# Patient Record
Sex: MALE | Race: WHITE | NOT HISPANIC OR LATINO | Employment: OTHER | URBAN - METROPOLITAN AREA
[De-identification: names, ages, dates, MRNs, and addresses within clinical notes are randomized per-mention and may not be internally consistent; named-entity substitution may affect disease eponyms.]

---

## 2017-12-05 ENCOUNTER — APPOINTMENT (INPATIENT)
Dept: RADIOLOGY | Facility: HOSPITAL | Age: 67
DRG: 682 | End: 2017-12-05
Payer: MEDICARE

## 2017-12-05 ENCOUNTER — APPOINTMENT (EMERGENCY)
Dept: RADIOLOGY | Facility: HOSPITAL | Age: 67
DRG: 682 | End: 2017-12-05
Payer: MEDICARE

## 2017-12-05 ENCOUNTER — HOSPITAL ENCOUNTER (INPATIENT)
Facility: HOSPITAL | Age: 67
LOS: 7 days | Discharge: NURSING, REHAB OR LONG-TERM CARE | DRG: 682 | End: 2017-12-12
Attending: EMERGENCY MEDICINE | Admitting: INTERNAL MEDICINE
Payer: MEDICARE

## 2017-12-05 DIAGNOSIS — R29.6 RECURRENT FALLS: Primary | ICD-10-CM

## 2017-12-05 DIAGNOSIS — R27.0 ATAXIA: ICD-10-CM

## 2017-12-05 DIAGNOSIS — S83.8X9A MENISCAL INJURY: ICD-10-CM

## 2017-12-05 DIAGNOSIS — R77.8 ELEVATED TROPONIN: ICD-10-CM

## 2017-12-05 DIAGNOSIS — R74.8 ELEVATED CK-MB LEVEL: ICD-10-CM

## 2017-12-05 PROBLEM — E87.20 LACTIC ACID ACIDOSIS: Status: ACTIVE | Noted: 2017-12-05

## 2017-12-05 PROBLEM — G31.84 MILD COGNITIVE IMPAIRMENT: Chronic | Status: ACTIVE | Noted: 2017-12-05

## 2017-12-05 PROBLEM — R73.9 HYPERGLYCEMIA: Status: ACTIVE | Noted: 2017-12-05

## 2017-12-05 PROBLEM — R79.89 TROPONIN LEVEL ELEVATED: Status: ACTIVE | Noted: 2017-12-05

## 2017-12-05 PROBLEM — N17.9 AKI (ACUTE KIDNEY INJURY) (HCC): Status: ACTIVE | Noted: 2017-12-05

## 2017-12-05 PROBLEM — E87.2 LACTIC ACID ACIDOSIS: Status: ACTIVE | Noted: 2017-12-05

## 2017-12-05 PROBLEM — I10 HTN (HYPERTENSION): Status: ACTIVE | Noted: 2017-12-05

## 2017-12-05 PROBLEM — R65.10 SIRS (SYSTEMIC INFLAMMATORY RESPONSE SYNDROME) (HCC): Status: ACTIVE | Noted: 2017-12-05

## 2017-12-05 PROBLEM — T07.XXXA MULTIPLE INJURIES: Status: ACTIVE | Noted: 2017-12-05

## 2017-12-05 PROBLEM — M62.82 RHABDOMYOLYSIS: Status: ACTIVE | Noted: 2017-12-05

## 2017-12-05 LAB
ALBUMIN SERPL BCP-MCNC: 3.6 G/DL (ref 3.5–5)
ALP SERPL-CCNC: 98 U/L (ref 46–116)
ALT SERPL W P-5'-P-CCNC: 56 U/L (ref 12–78)
ANION GAP SERPL CALCULATED.3IONS-SCNC: 10 MMOL/L (ref 4–13)
AST SERPL W P-5'-P-CCNC: 129 U/L (ref 5–45)
BACTERIA UR QL AUTO: ABNORMAL /HPF
BASOPHILS # BLD AUTO: 0.02 THOUSANDS/ΜL (ref 0–0.1)
BASOPHILS NFR BLD AUTO: 0 % (ref 0–1)
BILIRUB SERPL-MCNC: 0.98 MG/DL (ref 0.2–1)
BILIRUB UR QL STRIP: ABNORMAL
BUN SERPL-MCNC: 25 MG/DL (ref 5–25)
CALCIUM SERPL-MCNC: 9.4 MG/DL (ref 8.3–10.1)
CHLORIDE SERPL-SCNC: 111 MMOL/L (ref 100–108)
CK MB SERPL-MCNC: 26.6 NG/ML (ref 0–5)
CK MB SERPL-MCNC: <1 % (ref 0–2.5)
CK SERPL-CCNC: 4079 U/L (ref 39–308)
CLARITY UR: ABNORMAL
CO2 SERPL-SCNC: 22 MMOL/L (ref 21–32)
COLOR UR: ABNORMAL
CREAT SERPL-MCNC: 1.52 MG/DL (ref 0.6–1.3)
EOSINOPHIL # BLD AUTO: 0 THOUSAND/ΜL (ref 0–0.61)
EOSINOPHIL NFR BLD AUTO: 0 % (ref 0–6)
ERYTHROCYTE [DISTWIDTH] IN BLOOD BY AUTOMATED COUNT: 14.3 % (ref 11.6–15.1)
GFR SERPL CREATININE-BSD FRML MDRD: 47 ML/MIN/1.73SQ M
GLUCOSE SERPL-MCNC: 164 MG/DL (ref 65–140)
GLUCOSE UR STRIP-MCNC: NEGATIVE MG/DL
HCT VFR BLD AUTO: 42.2 % (ref 36.5–49.3)
HGB BLD-MCNC: 14.1 G/DL (ref 12–17)
HGB UR QL STRIP.AUTO: ABNORMAL
HYALINE CASTS #/AREA URNS LPF: ABNORMAL /LPF
KETONES UR STRIP-MCNC: ABNORMAL MG/DL
LACTATE SERPL-SCNC: 2.5 MMOL/L (ref 0.5–2)
LACTATE SERPL-SCNC: 3.3 MMOL/L (ref 0.5–2)
LEUKOCYTE ESTERASE UR QL STRIP: NEGATIVE
LYMPHOCYTES # BLD AUTO: 1.55 THOUSANDS/ΜL (ref 0.6–4.47)
LYMPHOCYTES NFR BLD AUTO: 8 % (ref 14–44)
MCH RBC QN AUTO: 30.4 PG (ref 26.8–34.3)
MCHC RBC AUTO-ENTMCNC: 33.4 G/DL (ref 31.4–37.4)
MCV RBC AUTO: 91 FL (ref 82–98)
MONOCYTES # BLD AUTO: 2.05 THOUSAND/ΜL (ref 0.17–1.22)
MONOCYTES NFR BLD AUTO: 11 % (ref 4–12)
NEUTROPHILS # BLD AUTO: 15.51 THOUSANDS/ΜL (ref 1.85–7.62)
NEUTS SEG NFR BLD AUTO: 81 % (ref 43–75)
NITRITE UR QL STRIP: POSITIVE
NON-SQ EPI CELLS URNS QL MICRO: ABNORMAL /HPF
NRBC BLD AUTO-RTO: 0 /100 WBCS
PH UR STRIP.AUTO: 5.5 [PH] (ref 4.5–8)
PLATELET # BLD AUTO: 348 THOUSANDS/UL (ref 149–390)
PMV BLD AUTO: 10.5 FL (ref 8.9–12.7)
POTASSIUM SERPL-SCNC: 3.8 MMOL/L (ref 3.5–5.3)
PROT SERPL-MCNC: 7.6 G/DL (ref 6.4–8.2)
PROT UR STRIP-MCNC: ABNORMAL MG/DL
RBC # BLD AUTO: 4.64 MILLION/UL (ref 3.88–5.62)
RBC #/AREA URNS AUTO: ABNORMAL /HPF
SODIUM SERPL-SCNC: 143 MMOL/L (ref 136–145)
SP GR UR STRIP.AUTO: >=1.03 (ref 1–1.03)
TROPONIN I SERPL-MCNC: 0.06 NG/ML
UROBILINOGEN UR QL STRIP.AUTO: 1 E.U./DL
WBC # BLD AUTO: 19.22 THOUSAND/UL (ref 4.31–10.16)
WBC #/AREA URNS AUTO: ABNORMAL /HPF

## 2017-12-05 PROCEDURE — 80053 COMPREHEN METABOLIC PANEL: CPT | Performed by: EMERGENCY MEDICINE

## 2017-12-05 PROCEDURE — 83605 ASSAY OF LACTIC ACID: CPT | Performed by: EMERGENCY MEDICINE

## 2017-12-05 PROCEDURE — 96360 HYDRATION IV INFUSION INIT: CPT

## 2017-12-05 PROCEDURE — 87086 URINE CULTURE/COLONY COUNT: CPT

## 2017-12-05 PROCEDURE — 82553 CREATINE MB FRACTION: CPT | Performed by: EMERGENCY MEDICINE

## 2017-12-05 PROCEDURE — 70450 CT HEAD/BRAIN W/O DYE: CPT

## 2017-12-05 PROCEDURE — 85025 COMPLETE CBC W/AUTO DIFF WBC: CPT | Performed by: EMERGENCY MEDICINE

## 2017-12-05 PROCEDURE — 84443 ASSAY THYROID STIM HORMONE: CPT | Performed by: INTERNAL MEDICINE

## 2017-12-05 PROCEDURE — 71010 HB CHEST X-RAY 1 VIEW FRONTAL: CPT

## 2017-12-05 PROCEDURE — 87186 SC STD MICRODIL/AGAR DIL: CPT

## 2017-12-05 PROCEDURE — 73080 X-RAY EXAM OF ELBOW: CPT

## 2017-12-05 PROCEDURE — 72070 X-RAY EXAM THORAC SPINE 2VWS: CPT

## 2017-12-05 PROCEDURE — 72125 CT NECK SPINE W/O DYE: CPT

## 2017-12-05 PROCEDURE — 84484 ASSAY OF TROPONIN QUANT: CPT | Performed by: EMERGENCY MEDICINE

## 2017-12-05 PROCEDURE — 81002 URINALYSIS NONAUTO W/O SCOPE: CPT | Performed by: EMERGENCY MEDICINE

## 2017-12-05 PROCEDURE — 82306 VITAMIN D 25 HYDROXY: CPT | Performed by: INTERNAL MEDICINE

## 2017-12-05 PROCEDURE — 81001 URINALYSIS AUTO W/SCOPE: CPT

## 2017-12-05 PROCEDURE — 36415 COLL VENOUS BLD VENIPUNCTURE: CPT

## 2017-12-05 PROCEDURE — 36415 COLL VENOUS BLD VENIPUNCTURE: CPT | Performed by: EMERGENCY MEDICINE

## 2017-12-05 PROCEDURE — 73060 X-RAY EXAM OF HUMERUS: CPT

## 2017-12-05 PROCEDURE — 82550 ASSAY OF CK (CPK): CPT | Performed by: EMERGENCY MEDICINE

## 2017-12-05 PROCEDURE — 83918 ORGANIC ACIDS TOTAL QUANT: CPT | Performed by: INTERNAL MEDICINE

## 2017-12-05 PROCEDURE — 73560 X-RAY EXAM OF KNEE 1 OR 2: CPT

## 2017-12-05 PROCEDURE — 93005 ELECTROCARDIOGRAM TRACING: CPT | Performed by: EMERGENCY MEDICINE

## 2017-12-05 PROCEDURE — 82607 VITAMIN B-12: CPT | Performed by: INTERNAL MEDICINE

## 2017-12-05 PROCEDURE — 84484 ASSAY OF TROPONIN QUANT: CPT | Performed by: INTERNAL MEDICINE

## 2017-12-05 PROCEDURE — 82140 ASSAY OF AMMONIA: CPT | Performed by: INTERNAL MEDICINE

## 2017-12-05 PROCEDURE — 87040 BLOOD CULTURE FOR BACTERIA: CPT | Performed by: EMERGENCY MEDICINE

## 2017-12-05 PROCEDURE — 85049 AUTOMATED PLATELET COUNT: CPT | Performed by: INTERNAL MEDICINE

## 2017-12-05 PROCEDURE — 73030 X-RAY EXAM OF SHOULDER: CPT

## 2017-12-05 PROCEDURE — 87077 CULTURE AEROBIC IDENTIFY: CPT

## 2017-12-05 RX ORDER — ACETAMINOPHEN 325 MG/1
650 TABLET ORAL EVERY 6 HOURS PRN
Status: DISCONTINUED | OUTPATIENT
Start: 2017-12-05 | End: 2017-12-06

## 2017-12-05 RX ORDER — DOCUSATE SODIUM 100 MG/1
100 CAPSULE, LIQUID FILLED ORAL 2 TIMES DAILY
Status: DISCONTINUED | OUTPATIENT
Start: 2017-12-06 | End: 2017-12-12 | Stop reason: HOSPADM

## 2017-12-05 RX ORDER — SODIUM CHLORIDE 9 MG/ML
100 INJECTION, SOLUTION INTRAVENOUS CONTINUOUS
Status: DISCONTINUED | OUTPATIENT
Start: 2017-12-05 | End: 2017-12-06

## 2017-12-05 RX ORDER — HEPARIN SODIUM 5000 [USP'U]/ML
5000 INJECTION, SOLUTION INTRAVENOUS; SUBCUTANEOUS EVERY 8 HOURS SCHEDULED
Status: DISCONTINUED | OUTPATIENT
Start: 2017-12-05 | End: 2017-12-09

## 2017-12-05 RX ORDER — ONDANSETRON 2 MG/ML
4 INJECTION INTRAMUSCULAR; INTRAVENOUS EVERY 6 HOURS PRN
Status: DISCONTINUED | OUTPATIENT
Start: 2017-12-05 | End: 2017-12-12 | Stop reason: HOSPADM

## 2017-12-05 RX ADMIN — SODIUM CHLORIDE 1000 ML: 0.9 INJECTION, SOLUTION INTRAVENOUS at 20:36

## 2017-12-05 RX ADMIN — SODIUM CHLORIDE 1000 ML: 0.9 INJECTION, SOLUTION INTRAVENOUS at 23:42

## 2017-12-06 ENCOUNTER — APPOINTMENT (INPATIENT)
Dept: RADIOLOGY | Facility: HOSPITAL | Age: 67
DRG: 682 | End: 2017-12-06
Payer: MEDICARE

## 2017-12-06 PROBLEM — I21.A1 NON-ST ELEVATION MYOCARDIAL INFARCTION (NSTEMI), TYPE 2: Status: ACTIVE | Noted: 2017-12-05

## 2017-12-06 PROBLEM — R26.2 AMBULATORY DYSFUNCTION: Status: ACTIVE | Noted: 2017-12-05

## 2017-12-06 PROBLEM — R73.03 PREDIABETES: Status: ACTIVE | Noted: 2017-12-05

## 2017-12-06 LAB
ALBUMIN SERPL BCP-MCNC: 3.5 G/DL (ref 3.5–5)
ALP SERPL-CCNC: 94 U/L (ref 46–116)
ALT SERPL W P-5'-P-CCNC: 58 U/L (ref 12–78)
AMMONIA PLAS-SCNC: 12 UMOL/L (ref 11–35)
ANION GAP SERPL CALCULATED.3IONS-SCNC: 7 MMOL/L (ref 4–13)
AST SERPL W P-5'-P-CCNC: 157 U/L (ref 5–45)
ATRIAL RATE: 120 BPM
BILIRUB SERPL-MCNC: 0.8 MG/DL (ref 0.2–1)
BUN SERPL-MCNC: 25 MG/DL (ref 5–25)
CALCIUM SERPL-MCNC: 9.1 MG/DL (ref 8.3–10.1)
CHLORIDE SERPL-SCNC: 114 MMOL/L (ref 100–108)
CO2 SERPL-SCNC: 25 MMOL/L (ref 21–32)
CREAT SERPL-MCNC: 1.08 MG/DL (ref 0.6–1.3)
ERYTHROCYTE [DISTWIDTH] IN BLOOD BY AUTOMATED COUNT: 14.3 % (ref 11.6–15.1)
EST. AVERAGE GLUCOSE BLD GHB EST-MCNC: 137 MG/DL
GFR SERPL CREATININE-BSD FRML MDRD: 71 ML/MIN/1.73SQ M
GLUCOSE SERPL-MCNC: 118 MG/DL (ref 65–140)
HBA1C MFR BLD: 6.4 % (ref 4.2–6.3)
HCT VFR BLD AUTO: 44.3 % (ref 36.5–49.3)
HGB BLD-MCNC: 14.8 G/DL (ref 12–17)
LACTATE SERPL-SCNC: 1.4 MMOL/L (ref 0.5–2)
MAGNESIUM SERPL-MCNC: 2.6 MG/DL (ref 1.6–2.6)
MCH RBC QN AUTO: 30.2 PG (ref 26.8–34.3)
MCHC RBC AUTO-ENTMCNC: 33.4 G/DL (ref 31.4–37.4)
MCV RBC AUTO: 90 FL (ref 82–98)
P AXIS: 40 DEGREES
PHOSPHATE SERPL-MCNC: 3.2 MG/DL (ref 2.3–4.1)
PLATELET # BLD AUTO: 379 THOUSANDS/UL (ref 149–390)
PLATELET # BLD AUTO: 401 THOUSANDS/UL (ref 149–390)
PMV BLD AUTO: 10.1 FL (ref 8.9–12.7)
PMV BLD AUTO: 10.4 FL (ref 8.9–12.7)
POTASSIUM SERPL-SCNC: 4.2 MMOL/L (ref 3.5–5.3)
PR INTERVAL: 148 MS
PROT SERPL-MCNC: 7.3 G/DL (ref 6.4–8.2)
QRS AXIS: 18 DEGREES
QRSD INTERVAL: 78 MS
QT INTERVAL: 314 MS
QTC INTERVAL: 443 MS
RBC # BLD AUTO: 4.9 MILLION/UL (ref 3.88–5.62)
SODIUM SERPL-SCNC: 146 MMOL/L (ref 136–145)
T WAVE AXIS: 43 DEGREES
TROPONIN I SERPL-MCNC: 0.05 NG/ML
TROPONIN I SERPL-MCNC: 0.06 NG/ML
TROPONIN I SERPL-MCNC: 0.06 NG/ML
TSH SERPL DL<=0.05 MIU/L-ACNC: 1.33 UIU/ML (ref 0.36–3.74)
VENTRICULAR RATE: 120 BPM
VIT B12 SERPL-MCNC: 311 PG/ML (ref 100–900)
WBC # BLD AUTO: 12.87 THOUSAND/UL (ref 4.31–10.16)

## 2017-12-06 PROCEDURE — 90715 TDAP VACCINE 7 YRS/> IM: CPT | Performed by: EMERGENCY MEDICINE

## 2017-12-06 PROCEDURE — 84100 ASSAY OF PHOSPHORUS: CPT | Performed by: INTERNAL MEDICINE

## 2017-12-06 PROCEDURE — 83036 HEMOGLOBIN GLYCOSYLATED A1C: CPT | Performed by: INTERNAL MEDICINE

## 2017-12-06 PROCEDURE — 80053 COMPREHEN METABOLIC PANEL: CPT | Performed by: INTERNAL MEDICINE

## 2017-12-06 PROCEDURE — 83735 ASSAY OF MAGNESIUM: CPT | Performed by: INTERNAL MEDICINE

## 2017-12-06 PROCEDURE — 84484 ASSAY OF TROPONIN QUANT: CPT | Performed by: INTERNAL MEDICINE

## 2017-12-06 PROCEDURE — 99285 EMERGENCY DEPT VISIT HI MDM: CPT

## 2017-12-06 PROCEDURE — 85027 COMPLETE CBC AUTOMATED: CPT | Performed by: INTERNAL MEDICINE

## 2017-12-06 PROCEDURE — 73564 X-RAY EXAM KNEE 4 OR MORE: CPT

## 2017-12-06 PROCEDURE — 83605 ASSAY OF LACTIC ACID: CPT | Performed by: INTERNAL MEDICINE

## 2017-12-06 PROCEDURE — 36415 COLL VENOUS BLD VENIPUNCTURE: CPT | Performed by: INTERNAL MEDICINE

## 2017-12-06 RX ORDER — ACETAMINOPHEN 325 MG/1
650 TABLET ORAL EVERY 6 HOURS SCHEDULED
Status: DISCONTINUED | OUTPATIENT
Start: 2017-12-06 | End: 2017-12-12 | Stop reason: HOSPADM

## 2017-12-06 RX ORDER — SODIUM CHLORIDE 450 MG/100ML
100 INJECTION, SOLUTION INTRAVENOUS CONTINUOUS
Status: DISCONTINUED | OUTPATIENT
Start: 2017-12-06 | End: 2017-12-08

## 2017-12-06 RX ORDER — AMLODIPINE BESYLATE 5 MG/1
5 TABLET ORAL DAILY
Status: DISCONTINUED | OUTPATIENT
Start: 2017-12-06 | End: 2017-12-07

## 2017-12-06 RX ADMIN — DOCUSATE SODIUM 100 MG: 100 CAPSULE, LIQUID FILLED ORAL at 10:15

## 2017-12-06 RX ADMIN — HEPARIN SODIUM 5000 UNITS: 5000 INJECTION, SOLUTION INTRAVENOUS; SUBCUTANEOUS at 14:04

## 2017-12-06 RX ADMIN — ACETAMINOPHEN 650 MG: 325 TABLET, FILM COATED ORAL at 12:12

## 2017-12-06 RX ADMIN — TETANUS TOXOID, REDUCED DIPHTHERIA TOXOID AND ACELLULAR PERTUSSIS VACCINE, ADSORBED 0.5 ML: 5; 2.5; 8; 8; 2.5 SUSPENSION INTRAMUSCULAR at 01:52

## 2017-12-06 RX ADMIN — HEPARIN SODIUM 5000 UNITS: 5000 INJECTION, SOLUTION INTRAVENOUS; SUBCUTANEOUS at 01:53

## 2017-12-06 RX ADMIN — DOCUSATE SODIUM 100 MG: 100 CAPSULE, LIQUID FILLED ORAL at 16:52

## 2017-12-06 RX ADMIN — SODIUM CHLORIDE 100 ML/HR: 0.9 INJECTION, SOLUTION INTRAVENOUS at 03:49

## 2017-12-06 RX ADMIN — SODIUM CHLORIDE 100 ML/HR: 0.45 INJECTION, SOLUTION INTRAVENOUS at 08:49

## 2017-12-06 RX ADMIN — AMLODIPINE BESYLATE 5 MG: 5 TABLET ORAL at 16:47

## 2017-12-06 RX ADMIN — ACETAMINOPHEN 650 MG: 325 TABLET, FILM COATED ORAL at 16:48

## 2017-12-06 RX ADMIN — ACETAMINOPHEN 650 MG: 325 TABLET, FILM COATED ORAL at 23:51
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
## 2017-12-06 NOTE — SOCIAL WORK
Met with pt at bedside and pt states he resides alone in an apt and has 40 steps to enter his 2nd fl apt  Pt states he has no family and his only contact is his friend Elvira Sevilla [phone]  Pt states he does not have POA  Pt is indep at home and states he uses a roller walker or cane for ambulation  Pt states his PCP is Dr Sayda Pelayo  Pt states he does not have a RX and has not had to take pills  Pt denies hx of MH or Sa  Pt states we can contact Claudia Rahman and call to Claudia Rahman and he states his family basically took pt on bc he did not have anyone  Claudia Rahman also states pt has no hx of D&A or MH issues  Claudia Rahman states if pt needs rehab that [facility] would be the best bc it is close  Spoke to pt and he would be agreeable to this  Pt is aware Claudia Rahman will be in tonight and go over rehab choices in the event pt needs inpt rehab  Claudia Rahman also states he is going to contact [facility] to check on additional services for pt  CM reviewed d/c planning process including the following: identifying help at home, patient preference for d/c planning needs, Discharge Lounge, [facility] Meds to Bed program, availability of treatment team to discuss questions or concerns patient and/or family may have regarding understanding medications and recognizing signs and symptoms once discharged  CM also encouraged patient to follow up with all recommended appointments after discharge  Patient advised of importance for patient and family to participate in managing patients medical well being

## 2017-12-07 PROBLEM — E66.01 MORBID OBESITY WITH BMI OF 45.0-49.9, ADULT (HCC): Chronic | Status: ACTIVE | Noted: 2017-12-07

## 2017-12-07 LAB
ANION GAP SERPL CALCULATED.3IONS-SCNC: 7 MMOL/L (ref 4–13)
BUN SERPL-MCNC: 18 MG/DL (ref 5–25)
CALCIUM SERPL-MCNC: 8.4 MG/DL (ref 8.3–10.1)
CHLORIDE SERPL-SCNC: 115 MMOL/L (ref 100–108)
CK MB SERPL-MCNC: 11.6 NG/ML (ref 0–5)
CK MB SERPL-MCNC: <1 % (ref 0–2.5)
CK SERPL-CCNC: 3183 U/L (ref 39–308)
CO2 SERPL-SCNC: 23 MMOL/L (ref 21–32)
CREAT SERPL-MCNC: 0.77 MG/DL (ref 0.6–1.3)
ERYTHROCYTE [DISTWIDTH] IN BLOOD BY AUTOMATED COUNT: 14.1 % (ref 11.6–15.1)
GFR SERPL CREATININE-BSD FRML MDRD: 94 ML/MIN/1.73SQ M
GLUCOSE SERPL-MCNC: 105 MG/DL (ref 65–140)
HCT VFR BLD AUTO: 39.4 % (ref 36.5–49.3)
HGB BLD-MCNC: 13 G/DL (ref 12–17)
MCH RBC QN AUTO: 29.9 PG (ref 26.8–34.3)
MCHC RBC AUTO-ENTMCNC: 33 G/DL (ref 31.4–37.4)
MCV RBC AUTO: 91 FL (ref 82–98)
PLATELET # BLD AUTO: 329 THOUSANDS/UL (ref 149–390)
PMV BLD AUTO: 10.4 FL (ref 8.9–12.7)
POTASSIUM SERPL-SCNC: 3.6 MMOL/L (ref 3.5–5.3)
RBC # BLD AUTO: 4.35 MILLION/UL (ref 3.88–5.62)
SODIUM SERPL-SCNC: 145 MMOL/L (ref 136–145)
WBC # BLD AUTO: 10.59 THOUSAND/UL (ref 4.31–10.16)

## 2017-12-07 PROCEDURE — 85027 COMPLETE CBC AUTOMATED: CPT | Performed by: INTERNAL MEDICINE

## 2017-12-07 PROCEDURE — 82553 CREATINE MB FRACTION: CPT | Performed by: INTERNAL MEDICINE

## 2017-12-07 PROCEDURE — 82550 ASSAY OF CK (CPK): CPT | Performed by: INTERNAL MEDICINE

## 2017-12-07 PROCEDURE — 80048 BASIC METABOLIC PNL TOTAL CA: CPT | Performed by: INTERNAL MEDICINE

## 2017-12-07 RX ORDER — AMLODIPINE BESYLATE 10 MG/1
10 TABLET ORAL DAILY
Status: DISCONTINUED | OUTPATIENT
Start: 2017-12-08 | End: 2017-12-08

## 2017-12-07 RX ORDER — AMLODIPINE BESYLATE 10 MG/1
10 TABLET ORAL ONCE
Status: COMPLETED | OUTPATIENT
Start: 2017-12-07 | End: 2017-12-07

## 2017-12-07 RX ADMIN — ACETAMINOPHEN 650 MG: 325 TABLET, FILM COATED ORAL at 17:25

## 2017-12-07 RX ADMIN — DOCUSATE SODIUM 100 MG: 100 CAPSULE, LIQUID FILLED ORAL at 09:18

## 2017-12-07 RX ADMIN — ACETAMINOPHEN 650 MG: 325 TABLET, FILM COATED ORAL at 12:23

## 2017-12-07 RX ADMIN — DOCUSATE SODIUM 100 MG: 100 CAPSULE, LIQUID FILLED ORAL at 17:25

## 2017-12-07 RX ADMIN — HEPARIN SODIUM 5000 UNITS: 5000 INJECTION, SOLUTION INTRAVENOUS; SUBCUTANEOUS at 17:25

## 2017-12-07 RX ADMIN — HEPARIN SODIUM 5000 UNITS: 5000 INJECTION, SOLUTION INTRAVENOUS; SUBCUTANEOUS at 01:56

## 2017-12-07 RX ADMIN — AMLODIPINE BESYLATE 10 MG: 10 TABLET ORAL at 09:23

## 2017-12-07 RX ADMIN — SODIUM CHLORIDE 100 ML/HR: 0.45 INJECTION, SOLUTION INTRAVENOUS at 05:07

## 2017-12-07 RX ADMIN — HEPARIN SODIUM 5000 UNITS: 5000 INJECTION, SOLUTION INTRAVENOUS; SUBCUTANEOUS at 09:18

## 2017-12-07 RX ADMIN — ACETAMINOPHEN 650 MG: 325 TABLET, FILM COATED ORAL at 23:59

## 2017-12-07 RX ADMIN — ACETAMINOPHEN 650 MG: 325 TABLET, FILM COATED ORAL at 05:07

## 2017-12-07 RX ADMIN — SODIUM CHLORIDE 100 ML/HR: 0.45 INJECTION, SOLUTION INTRAVENOUS at 14:03

## 2017-12-07 NOTE — PHYSICAL THERAPY NOTE
Physical Therapy Cancellation Note    PT CONSULT RECEIVED  PATIENT IS PENDING MRI OF L KNEE  PT WILL HOLD SERVICES UNTIL RESULTS ARE READ TO CLARIFY WBS/ACTIVITY RESTRICTIONS       Radha Barcenas, PT

## 2017-12-07 NOTE — PROGRESS NOTES
Pt resting comfortably no respiratory distress noted on room air  Reddened areas on back open to air  Two blisters noted on sacrum  No complaints of pain at this time  Call bell within reach, will continue to monitor
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
## 2017-12-07 NOTE — OCCUPATIONAL THERAPY NOTE
Occupational Therapy Cancellation    Orders received  Chart reviewed  Pt currently pending MRI L knee  Will hold on formal evaluation pending results of MRI  Will continue to follow pt on caseload      Jessy Delong MS, OTR/L

## 2017-12-07 NOTE — PLAN OF CARE
DISCHARGE PLANNING     Discharge to home or other facility with appropriate resources Progressing        DISCHARGE PLANNING - CARE MANAGEMENT     Discharge to post-acute care or home with appropriate resources Progressing        Knowledge Deficit     Patient/family/caregiver demonstrates understanding of disease process, treatment plan, medications, and discharge instructions Progressing        METABOLIC, FLUID AND ELECTROLYTES - ADULT     Fluid balance maintained Progressing        MUSCULOSKELETAL - ADULT     Maintain or return mobility to safest level of function Progressing        PAIN - ADULT     Verbalizes/displays adequate comfort level or baseline comfort level Progressing        Potential for Falls     Patient will remain free of falls Progressing        Prexisting or High Potential for Compromised Skin Integrity     Skin integrity is maintained or improved Progressing        SAFETY ADULT     Maintain or return to baseline ADL function Progressing     Maintain or return mobility status to optimal level Progressing        SKIN/TISSUE INTEGRITY - ADULT     Incision(s), wounds(s) or drain site(s) healing without S/S of infection Progressing
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
## 2017-12-08 ENCOUNTER — APPOINTMENT (INPATIENT)
Dept: RADIOLOGY | Facility: HOSPITAL | Age: 67
DRG: 682 | End: 2017-12-08
Payer: MEDICARE

## 2017-12-08 LAB
ANION GAP SERPL CALCULATED.3IONS-SCNC: 5 MMOL/L (ref 4–13)
BACTERIA UR CULT: ABNORMAL
BASOPHILS # BLD AUTO: 0.03 THOUSANDS/ΜL (ref 0–0.1)
BASOPHILS NFR BLD AUTO: 0 % (ref 0–1)
BUN SERPL-MCNC: 13 MG/DL (ref 5–25)
CALCIUM SERPL-MCNC: 8.2 MG/DL (ref 8.3–10.1)
CHLORIDE SERPL-SCNC: 111 MMOL/L (ref 100–108)
CK MB SERPL-MCNC: 4.9 NG/ML (ref 0–5)
CK MB SERPL-MCNC: <1 % (ref 0–2.5)
CK SERPL-CCNC: 1508 U/L (ref 39–308)
CO2 SERPL-SCNC: 24 MMOL/L (ref 21–32)
CREAT SERPL-MCNC: 0.65 MG/DL (ref 0.6–1.3)
EOSINOPHIL # BLD AUTO: 0.23 THOUSAND/ΜL (ref 0–0.61)
EOSINOPHIL NFR BLD AUTO: 3 % (ref 0–6)
ERYTHROCYTE [DISTWIDTH] IN BLOOD BY AUTOMATED COUNT: 14 % (ref 11.6–15.1)
GFR SERPL CREATININE-BSD FRML MDRD: 101 ML/MIN/1.73SQ M
GLUCOSE SERPL-MCNC: 93 MG/DL (ref 65–140)
HCT VFR BLD AUTO: 37.4 % (ref 36.5–49.3)
HGB BLD-MCNC: 12.2 G/DL (ref 12–17)
LYMPHOCYTES # BLD AUTO: 1.87 THOUSANDS/ΜL (ref 0.6–4.47)
LYMPHOCYTES NFR BLD AUTO: 26 % (ref 14–44)
MCH RBC QN AUTO: 29.5 PG (ref 26.8–34.3)
MCHC RBC AUTO-ENTMCNC: 32.6 G/DL (ref 31.4–37.4)
MCV RBC AUTO: 91 FL (ref 82–98)
MONOCYTES # BLD AUTO: 1.03 THOUSAND/ΜL (ref 0.17–1.22)
MONOCYTES NFR BLD AUTO: 14 % (ref 4–12)
NEUTROPHILS # BLD AUTO: 4.11 THOUSANDS/ΜL (ref 1.85–7.62)
NEUTS SEG NFR BLD AUTO: 57 % (ref 43–75)
NRBC BLD AUTO-RTO: 0 /100 WBCS
PLATELET # BLD AUTO: 328 THOUSANDS/UL (ref 149–390)
PMV BLD AUTO: 10.2 FL (ref 8.9–12.7)
POTASSIUM SERPL-SCNC: 3.6 MMOL/L (ref 3.5–5.3)
RBC # BLD AUTO: 4.13 MILLION/UL (ref 3.88–5.62)
SODIUM SERPL-SCNC: 140 MMOL/L (ref 136–145)
WBC # BLD AUTO: 7.29 THOUSAND/UL (ref 4.31–10.16)

## 2017-12-08 PROCEDURE — 85025 COMPLETE CBC W/AUTO DIFF WBC: CPT | Performed by: STUDENT IN AN ORGANIZED HEALTH CARE EDUCATION/TRAINING PROGRAM

## 2017-12-08 PROCEDURE — 82550 ASSAY OF CK (CPK): CPT | Performed by: STUDENT IN AN ORGANIZED HEALTH CARE EDUCATION/TRAINING PROGRAM

## 2017-12-08 PROCEDURE — 80048 BASIC METABOLIC PNL TOTAL CA: CPT | Performed by: STUDENT IN AN ORGANIZED HEALTH CARE EDUCATION/TRAINING PROGRAM

## 2017-12-08 PROCEDURE — 82553 CREATINE MB FRACTION: CPT | Performed by: STUDENT IN AN ORGANIZED HEALTH CARE EDUCATION/TRAINING PROGRAM

## 2017-12-08 PROCEDURE — 73721 MRI JNT OF LWR EXTRE W/O DYE: CPT

## 2017-12-08 PROCEDURE — 3E0U33Z INTRODUCTION OF ANTI-INFLAMMATORY INTO JOINTS, PERCUTANEOUS APPROACH: ICD-10-PCS | Performed by: ORTHOPAEDIC SURGERY

## 2017-12-08 PROCEDURE — 3E0U3BZ INTRODUCTION OF ANESTHETIC AGENT INTO JOINTS, PERCUTANEOUS APPROACH: ICD-10-PCS | Performed by: ORTHOPAEDIC SURGERY

## 2017-12-08 RX ORDER — AMLODIPINE BESYLATE 5 MG/1
5 TABLET ORAL DAILY
Status: DISCONTINUED | OUTPATIENT
Start: 2017-12-08 | End: 2017-12-12 | Stop reason: HOSPADM

## 2017-12-08 RX ORDER — BUPIVACAINE HYDROCHLORIDE 2.5 MG/ML
10 INJECTION, SOLUTION EPIDURAL; INFILTRATION; INTRACAUDAL ONCE
Status: COMPLETED | OUTPATIENT
Start: 2017-12-08 | End: 2017-12-08

## 2017-12-08 RX ORDER — BETAMETHASONE SODIUM PHOSPHATE AND BETAMETHASONE ACETATE 3; 3 MG/ML; MG/ML
12 INJECTION, SUSPENSION INTRA-ARTICULAR; INTRALESIONAL; INTRAMUSCULAR; SOFT TISSUE ONCE
Status: COMPLETED | OUTPATIENT
Start: 2017-12-08 | End: 2017-12-08

## 2017-12-08 RX ORDER — LIDOCAINE HYDROCHLORIDE 10 MG/ML
10 INJECTION, SOLUTION EPIDURAL; INFILTRATION; INTRACAUDAL; PERINEURAL ONCE
Status: COMPLETED | OUTPATIENT
Start: 2017-12-08 | End: 2017-12-08

## 2017-12-08 RX ADMIN — BETAMETHASONE SODIUM PHOSPHATE AND BETAMETHASONE ACETATE 12 MG: 3; 3 INJECTION, SUSPENSION INTRA-ARTICULAR; INTRALESIONAL; INTRAMUSCULAR at 16:05

## 2017-12-08 RX ADMIN — AMLODIPINE BESYLATE 5 MG: 5 TABLET ORAL at 08:50

## 2017-12-08 RX ADMIN — HEPARIN SODIUM 5000 UNITS: 5000 INJECTION, SOLUTION INTRAVENOUS; SUBCUTANEOUS at 02:06

## 2017-12-08 RX ADMIN — HEPARIN SODIUM 5000 UNITS: 5000 INJECTION, SOLUTION INTRAVENOUS; SUBCUTANEOUS at 17:00

## 2017-12-08 RX ADMIN — ACETAMINOPHEN 650 MG: 325 TABLET, FILM COATED ORAL at 14:30

## 2017-12-08 RX ADMIN — BUPIVACAINE HYDROCHLORIDE 10 ML: 2.5 INJECTION, SOLUTION EPIDURAL; INFILTRATION; INTRACAUDAL at 16:05

## 2017-12-08 RX ADMIN — HEPARIN SODIUM 5000 UNITS: 5000 INJECTION, SOLUTION INTRAVENOUS; SUBCUTANEOUS at 09:48

## 2017-12-08 RX ADMIN — SODIUM CHLORIDE 100 ML/HR: 0.45 INJECTION, SOLUTION INTRAVENOUS at 00:57

## 2017-12-08 RX ADMIN — ACETAMINOPHEN 650 MG: 325 TABLET, FILM COATED ORAL at 17:00

## 2017-12-08 RX ADMIN — DOCUSATE SODIUM 100 MG: 100 CAPSULE, LIQUID FILLED ORAL at 08:50

## 2017-12-08 RX ADMIN — LIDOCAINE HYDROCHLORIDE 10 ML: 10 INJECTION, SOLUTION EPIDURAL; INFILTRATION; INTRACAUDAL; PERINEURAL at 16:05

## 2017-12-08 RX ADMIN — ACETAMINOPHEN 650 MG: 325 TABLET, FILM COATED ORAL at 07:03

## 2017-12-08 NOTE — PROGRESS NOTES
Pt resting in bed comfortably with no complaints  IVF infusing  VSS  2045 MRI of knee postponed at this time per  MRI tech  Will continue to monitor pt closely

## 2017-12-08 NOTE — CONSULTS
Orthopedics   Ronak Wilson 78 y o  male MRN: [id number]  [facility]      Chief Complaint:   left knee pain    HPI:   78 y o male status post twisting injury to left knee complaining of left knee pain  Patient states that he was at home on Monday when his left knee gave out on him causing him to fall floor  Patient states that he had continuous falls, approximately 20, on Monday prior to presenting to the emergency department  Patient is currently complaining of anterior medial and lateral knee pain  His pain is made worse with attempted ambulation and direct palpation to affected area  Pain subsided somewhat at rest   Patient denies any numbness or tingling  Patient denies pre-existing left knee pain  Patient does have a history of left knee surgery last year performed felt for however patient cannot recall the details of this knee procedure  Review Of Systems:   · Skin: Normal, no effusion of left knee   · Neuro: See HPI  · Musculoskeletal: See HPI  · 14 point review of systems negative except as stated above     Past Medical History:   Past Medical History:   Diagnosis Date    Hypertension     MCI (mild cognitive impairment)        Past Surgical History:   History reviewed  No pertinent surgical history  Family History:  Family history reviewed and non-contributory  No family history on file      Social History:  Social History     Social History    Marital status: Single     Spouse name: N/A    Number of children: N/A    Years of education: N/A     Social History Main Topics    Smoking status: Never Smoker    Smokeless tobacco: Never Used    Alcohol use No    Drug use: No    Sexual activity: Not Asked     Other Topics Concern    None     Social History Narrative    None       Allergies:   No Known Allergies        Labs:    0  Lab Value Date/Time   HCT 37 4 12/08/2017 0607   HCT 39 4 12/07/2017 0619   HCT 44 3 12/06/2017 0522   HGB 12 2 12/08/2017 0607   HGB 13 0 12/07/2017 0619   HGB 14 8 12/06/2017 0522   WBC 7 29 12/08/2017 0607   WBC 10 59 (H) 12/07/2017 0619   WBC 12 87 (H) 12/06/2017 0522       Meds:    Current Facility-Administered Medications:     acetaminophen (TYLENOL) tablet 650 mg, 650 mg, Oral, Q6H [address], Hallie Hikes, 650 mg at 12/08/17 0703    amLODIPine (NORVASC) tablet 5 mg, 5 mg, Oral, Daily, Ida New MD, 5 mg at 12/08/17 0850    betamethasone acetate-betamethasone sodium phosphate (CELESTONE) injection 12 mg, 12 mg, Intra-articular, Once, Caro Combs MD    bupivacaine (PF) (MARCAINE) 0 25 % injection 10 mL, 10 mL, Infiltration, Once, Caro Combs MD    docusate sodium (COLACE) capsule 100 mg, 100 mg, Oral, BID, Quincy Lindsay DO, 100 mg at 12/08/17 0850    heparin (porcine) subcutaneous injection 5,000 Units, 5,000 Units, Subcutaneous, Q8H [address], 5,000 Units at 12/08/17 0948 **AND** Platelet count, , , Once, Quincy Lindsay DO    lidocaine (PF) (XYLOCAINE-MPF) 1 % injection 10 mL, 10 mL, Infiltration, Once, Caro Combs MD    ondansetron [facility]) injection 4 mg, 4 mg, Intravenous, Q6H PRN, Quincy Lindsay DO    Blood Culture:   Lab Results   Component Value Date    BLOODCX No Growth at 48 hrs  12/05/2017       Wound Culture:   No results found for: WOUNDCULT    Ins and Outs:  I/O last 24 hours: In: 5208 3 [P O :1070; I V :4138 3]  Out: 650 [Urine:650]          Physical Exam:   /81   Pulse 80   Temp 98 6 °F (37 °C) (Oral)   Resp 18   Ht 5' 2" (1 575 m)   Wt 95 6 kg (210 lb 12 2 oz)   SpO2 94%   BMI 38 55 kg/m²   Gen: Alert and oriented to person, place, time  HEENT: EOMI, eyes clear, moist mucus membranes, hearing intact  Respiratory: Bilateral chest rise   No audible wheezing found  Cardiovascular: Regular Rate and Rhythm  Abdomen: soft nontender/nondistended  Musculoskeletal: left lower extremity  · Skin intact  · Tender to palpation over Medial, lateral and anterior knee  · TTP over medial and lateral joint line · Can perform striaght leg raise  · Stable to varus/valgus stress  · Negative lachmans, Posterior draw  · Positive McMurrays   · Sensation intact s/s/sp/dp/t  · +ehh/fhl, ankle df/pf, knee flex/ext  · +2 DP    Radiology:   I personally reviewed the films  X-rays left knee shows mild evidence of degenerative changes   MRI L knee shows posterior horn medial meniscus tear     Procedure- Orthopedics   Frances Villegas 78 y o  male MRN: [id number]  Unit/Bed#: [facility] 714-01    Procedure: left knee injection    After sterile preparation of the skin overlying the knee an 22 gauge needle was inserted via a superior lateral portal   A mixture of 2cc 1% lidocaine, 2cc   5% Marcaine, 2cc Betamethasone was injected into the knee joint without resistance  The needle was withdrawn and bandaid was placed over the site  Pt tolerated the procedure well and was neurovascularly intact both pre and post procedure      Marcos Randle MD            _*_*_*_*_*_*_*_*_*_*_*_*_*_*_*_*_*_*_*_*_*_*_*_*_*_*_*_*_*_*_*_*_*_*_*_*_*_*_*_*_*    Assessment:  78 y o male s/p fall with left knee pain and left posterior horn medial meniscus tear     Plan:   · Weight bearing as tolerated  left lower extremity  · PT  · Pain control  · Will see in AM and see how patient is doing after CSI injection   · Dispo: Ortho will follow      Marcos Randle MD

## 2017-12-08 NOTE — RESTORATIVE TECHNICIAN NOTE
Restorative Specialist Mobility Note       Activity: Ambulate in campbell, Ambulate in room, Bathroom privileges, Chair, Dangle, Stand at bedside (Educated/encouraged pt to ambulate w/assistance 3-4 x's/day  Chair alarm on   Pt callbell, phone/tray within reach )     Assistive Device: Front wheel walker       [employer] BS, Restorative Technician, [employer]
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
## 2017-12-08 NOTE — OCCUPATIONAL THERAPY NOTE
Occupational Therapy         Patient Name: Aniket Arzate  [id number] Date: 12/8/2017    OT consult received and chart review completed  Pt  cx'd this pm  Awaiting Ortho consult to comment on findings of MRI Of L knee and clarification of WB and activity       Brown Merritt, LANDON, OTR/L

## 2017-12-09 LAB
25(OH)D2 SERPL-MCNC: <1 NG/ML
25(OH)D3 SERPL-MCNC: 12 NG/ML
25(OH)D3+25(OH)D2 SERPL-MCNC: 13 NG/ML
ANION GAP SERPL CALCULATED.3IONS-SCNC: 8 MMOL/L (ref 4–13)
BASOPHILS # BLD MANUAL: 0 THOUSAND/UL (ref 0–0.1)
BASOPHILS NFR MAR MANUAL: 0 % (ref 0–1)
BUN SERPL-MCNC: 15 MG/DL (ref 5–25)
BURR CELLS BLD QL SMEAR: PRESENT
CALCIUM SERPL-MCNC: 8.9 MG/DL (ref 8.3–10.1)
CHLORIDE SERPL-SCNC: 112 MMOL/L (ref 100–108)
CO2 SERPL-SCNC: 22 MMOL/L (ref 21–32)
CREAT SERPL-MCNC: 0.69 MG/DL (ref 0.6–1.3)
EOSINOPHIL # BLD MANUAL: 0 THOUSAND/UL (ref 0–0.4)
EOSINOPHIL NFR BLD MANUAL: 0 % (ref 0–6)
ERYTHROCYTE [DISTWIDTH] IN BLOOD BY AUTOMATED COUNT: 14 % (ref 11.6–15.1)
GFR SERPL CREATININE-BSD FRML MDRD: 98 ML/MIN/1.73SQ M
GLUCOSE SERPL-MCNC: 137 MG/DL (ref 65–140)
HCT VFR BLD AUTO: 38.9 % (ref 36.5–49.3)
HGB BLD-MCNC: 12.7 G/DL (ref 12–17)
LG PLATELETS BLD QL SMEAR: PRESENT
LYMPHOCYTES # BLD AUTO: 0.4 THOUSAND/UL (ref 0.6–4.47)
LYMPHOCYTES # BLD AUTO: 4 % (ref 14–44)
MCH RBC QN AUTO: 29.5 PG (ref 26.8–34.3)
MCHC RBC AUTO-ENTMCNC: 32.6 G/DL (ref 31.4–37.4)
MCV RBC AUTO: 91 FL (ref 82–98)
MONOCYTES # BLD AUTO: 0.1 THOUSAND/UL (ref 0–1.22)
MONOCYTES NFR BLD: 1 % (ref 4–12)
NEUTROPHILS # BLD MANUAL: 9.51 THOUSAND/UL (ref 1.85–7.62)
NEUTS SEG NFR BLD AUTO: 95 % (ref 43–75)
NRBC BLD AUTO-RTO: 0 /100 WBCS
PLATELET # BLD AUTO: 342 THOUSANDS/UL (ref 149–390)
PLATELET BLD QL SMEAR: ADEQUATE
PMV BLD AUTO: 10.3 FL (ref 8.9–12.7)
POIKILOCYTOSIS BLD QL SMEAR: PRESENT
POTASSIUM SERPL-SCNC: 4.2 MMOL/L (ref 3.5–5.3)
RBC # BLD AUTO: 4.3 MILLION/UL (ref 3.88–5.62)
RBC MORPH BLD: PRESENT
SODIUM SERPL-SCNC: 142 MMOL/L (ref 136–145)
WBC # BLD AUTO: 10.01 THOUSAND/UL (ref 4.31–10.16)

## 2017-12-09 PROCEDURE — 97167 OT EVAL HIGH COMPLEX 60 MIN: CPT

## 2017-12-09 PROCEDURE — 85007 BL SMEAR W/DIFF WBC COUNT: CPT | Performed by: STUDENT IN AN ORGANIZED HEALTH CARE EDUCATION/TRAINING PROGRAM

## 2017-12-09 PROCEDURE — 85027 COMPLETE CBC AUTOMATED: CPT | Performed by: STUDENT IN AN ORGANIZED HEALTH CARE EDUCATION/TRAINING PROGRAM

## 2017-12-09 PROCEDURE — 80048 BASIC METABOLIC PNL TOTAL CA: CPT | Performed by: STUDENT IN AN ORGANIZED HEALTH CARE EDUCATION/TRAINING PROGRAM

## 2017-12-09 PROCEDURE — G8988 SELF CARE GOAL STATUS: HCPCS

## 2017-12-09 PROCEDURE — G8987 SELF CARE CURRENT STATUS: HCPCS

## 2017-12-09 RX ORDER — HEPARIN SODIUM 5000 [USP'U]/ML
5000 INJECTION, SOLUTION INTRAVENOUS; SUBCUTANEOUS EVERY 8 HOURS SCHEDULED
Status: DISCONTINUED | OUTPATIENT
Start: 2017-12-09 | End: 2017-12-12 | Stop reason: HOSPADM

## 2017-12-09 RX ADMIN — ACETAMINOPHEN 650 MG: 325 TABLET, FILM COATED ORAL at 16:46

## 2017-12-09 RX ADMIN — HEPARIN SODIUM 5000 UNITS: 5000 INJECTION, SOLUTION INTRAVENOUS; SUBCUTANEOUS at 16:45

## 2017-12-09 RX ADMIN — HEPARIN SODIUM 5000 UNITS: 5000 INJECTION, SOLUTION INTRAVENOUS; SUBCUTANEOUS at 04:10

## 2017-12-09 RX ADMIN — ACETAMINOPHEN 650 MG: 325 TABLET, FILM COATED ORAL at 06:04

## 2017-12-09 RX ADMIN — AMLODIPINE BESYLATE 5 MG: 5 TABLET ORAL at 08:00

## 2017-12-09 NOTE — PROGRESS NOTES
Orthopedics   Allegra Barnes 78 y o  male MRN: [id number]  Unit/Bed#: [facility] 714-01      S: Patient states left knee pain has significantly improved today  Denies numbness or tingling  No acute events overnight  Labs:    0  Lab Value Date/Time   HCT 38 9 12/09/2017 0438   HCT 37 4 12/08/2017 0607   HCT 39 4 12/07/2017 0619   HGB 12 7 12/09/2017 0438   HGB 12 2 12/08/2017 0607   HGB 13 0 12/07/2017 0619   WBC 10 01 12/09/2017 0438   WBC 7 29 12/08/2017 0607   WBC 10 59 (H) 12/07/2017 0619       Meds:    Current Facility-Administered Medications:     acetaminophen (TYLENOL) tablet 650 mg, 650 mg, Oral, Q6H [address], Hallie Hikes, 650 mg at 12/09/17 0604    amLODIPine (NORVASC) tablet 5 mg, 5 mg, Oral, Daily, Teri Pepper MD, 5 mg at 12/09/17 0800    docusate sodium (COLACE) capsule 100 mg, 100 mg, Oral, BID, Quincy Royal, DO, 100 mg at 12/08/17 0850    heparin (porcine) subcutaneous injection 5,000 Units, 5,000 Units, Subcutaneous, Q8H [address], 5,000 Units at 12/09/17 0410 **AND** Platelet count, , , Once, Quincy Royal, DO    ondansetron [facility]) injection 4 mg, 4 mg, Intravenous, Q6H PRN, Quincy Royal, DO    Blood Culture:   Lab Results   Component Value Date    BLOODCX No Growth at 72 hrs  12/05/2017       Wound Culture:   No results found for: WOUNDCULT    Ins and Outs:  I/O last 24 hours:   In: 2214 [P O :931; I V :900]  Out: 895 [Urine:895]          Physical Exam:   /83   Pulse 72   Temp 98 1 °F (36 7 °C) (Oral)   Resp 18   Ht 5' 2" (1 575 m)   Wt 95 6 kg (210 lb 12 2 oz)   SpO2 96%   BMI 38 55 kg/m²     Musculoskeletal: left lower extremity  · Skin intact  · Improved TTP along medial and lateral joint line   · Can perform striaght leg raise  · Stable to varus/valgus stress  · Sensation intact in all  Distributions distally   · +ehh/fhl, ankle df/pf, knee flex/ext  · +2 DP            _*_*_*_*_*_*_*_*_*_*_*_*_*_*_*_*_*_*_*_*_*_*_*_*_*_*_*_*_*_*_*_*_*_*_*_*_*_*_*_*_*    Assessment:  66 y o male s/p fall with left knee pain and left posterior horn medial meniscus tear, improving pain        Plan:   · Weight bearing as tolerated  left lower extremity  · PT  · Pain control  · Patient doing well after CSI, may follow up on an as needed basis as an outpatient with Dr Jesus Manuel Mcgovern for future management of left meniscus tear   · Dispo: Ortho to sign off       Robina Jones MD
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
## 2017-12-09 NOTE — PLAN OF CARE
Problem: OCCUPATIONAL THERAPY ADULT  Goal: Performs self-care activities at highest level of function for planned discharge setting  See evaluation for individualized goals  Treatment Interventions: ADL retraining, Functional transfer training, Endurance training, Cognitive reorientation, Patient/family training, Equipment evaluation/education, Compensatory technique education, Continued evaluation, Energy conservation, Activityengagement          See flowsheet documentation for full assessment, interventions and recommendations  Limitation: Decreased ADL status, Decreased Safe judgement during ADL, Decreased cognition, Decreased endurance, Decreased self-care trans, Decreased high-level ADLs     Assessment: Pt is a 77 y/o male seen for OT eval s/p adm to [facility] s/p unwitnessed fall w/ L knee pain dx'd w/ left posterior horn medial meniscus tear  Comorbidities include mild cognitive impairment, HTN, GRIFFIN, rhabdomyolysis, SIRS, NSTEMI type 2, and prediabetes  Pt with active OT orders and up with assistance orders  Pt is WBAT per ortho note  Pt lives alone in a 2nd floor apartment w/ [address]  Pt was I w/ ADLS and reports some A for IADLS from a friend (i e  Shopping)  Pt reports use of SPC at home and rw in the community PTA  Pt reports 20 falls at home on Monday  Pt is currently demonstrating the following occupational deficits: min A UB ADLS, mod A LB ADLS, min A functional transfers and mod A functional mobility using rw  Pt with deficits and limitations in all baseline areas of occupation 2* pain, decreased endurance/activity tolerance, decreased functional forward reach, decreased ADL status, impaired balance, decreased mobility status, deconditioning/generalized weakness, decreased skin integrity, decreased safety awareness/judgement, decreased cognition, decreased insight into deficits and decreased caregiver support   The following Occupational Performance Areas to address include: bathing/shower, toilet hygiene, dressing, medication management, functional mobility, community mobility, clothing management, meal prep and household maintenance  Pt scored overall 45/100 on the Barthel Index  Based on the aforementioned OT evaluation, functional performance deficits, and assessments, pt has been identified as a high complexity evaluation  Recommend STR upon D/C to increase functional skills and independence   Pt to continue to benefit from acute immediate OT services to address the following goals 3-5x/wk to  w/in 7-10 days:     OT Discharge Recommendation: Short Term Rehab  OT - OK to Discharge: Yes (to STR when medically stable)    Nile Reason, MS, OTR/L
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
## 2017-12-10 RX ADMIN — HEPARIN SODIUM 5000 UNITS: 5000 INJECTION, SOLUTION INTRAVENOUS; SUBCUTANEOUS at 17:08

## 2017-12-10 RX ADMIN — ACETAMINOPHEN 650 MG: 325 TABLET, FILM COATED ORAL at 11:05

## 2017-12-10 RX ADMIN — AMLODIPINE BESYLATE 5 MG: 5 TABLET ORAL at 08:25

## 2017-12-10 RX ADMIN — ACETAMINOPHEN 650 MG: 325 TABLET, FILM COATED ORAL at 17:08

## 2017-12-10 RX ADMIN — ACETAMINOPHEN 650 MG: 325 TABLET, FILM COATED ORAL at 06:16

## 2017-12-10 RX ADMIN — HEPARIN SODIUM 5000 UNITS: 5000 INJECTION, SOLUTION INTRAVENOUS; SUBCUTANEOUS at 08:27

## 2017-12-10 RX ADMIN — ACETAMINOPHEN 650 MG: 325 TABLET, FILM COATED ORAL at 00:59

## 2017-12-10 RX ADMIN — HEPARIN SODIUM 5000 UNITS: 5000 INJECTION, SOLUTION INTRAVENOUS; SUBCUTANEOUS at 01:00
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
## 2017-12-11 LAB
BACTERIA BLD CULT: NORMAL
BACTERIA BLD CULT: NORMAL

## 2017-12-11 PROCEDURE — G8978 MOBILITY CURRENT STATUS: HCPCS

## 2017-12-11 PROCEDURE — 97110 THERAPEUTIC EXERCISES: CPT

## 2017-12-11 PROCEDURE — G8979 MOBILITY GOAL STATUS: HCPCS

## 2017-12-11 PROCEDURE — 97163 PT EVAL HIGH COMPLEX 45 MIN: CPT

## 2017-12-11 RX ADMIN — ACETAMINOPHEN 650 MG: 325 TABLET, FILM COATED ORAL at 05:31

## 2017-12-11 RX ADMIN — HEPARIN SODIUM 5000 UNITS: 5000 INJECTION, SOLUTION INTRAVENOUS; SUBCUTANEOUS at 17:02

## 2017-12-11 RX ADMIN — AMLODIPINE BESYLATE 5 MG: 5 TABLET ORAL at 09:36

## 2017-12-11 RX ADMIN — HEPARIN SODIUM 5000 UNITS: 5000 INJECTION, SOLUTION INTRAVENOUS; SUBCUTANEOUS at 01:29

## 2017-12-11 RX ADMIN — ACETAMINOPHEN 650 MG: 325 TABLET, FILM COATED ORAL at 00:50

## 2017-12-11 RX ADMIN — ACETAMINOPHEN 650 MG: 325 TABLET, FILM COATED ORAL at 17:01

## 2017-12-11 RX ADMIN — DOCUSATE SODIUM 100 MG: 100 CAPSULE, LIQUID FILLED ORAL at 17:01

## 2017-12-11 RX ADMIN — HEPARIN SODIUM 5000 UNITS: 5000 INJECTION, SOLUTION INTRAVENOUS; SUBCUTANEOUS at 09:36

## 2017-12-11 RX ADMIN — ACETAMINOPHEN 650 MG: 325 TABLET, FILM COATED ORAL at 11:48
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
## 2017-12-11 NOTE — PHYSICAL THERAPY NOTE
PT TREATMENT       12/11/17 1302   Pain Assessment   Pain Assessment No/denies pain   Pain Score No Pain   Restrictions/Precautions   Weight Bearing Precautions Per Order Yes   LLE Weight Bearing Per Order WBAT   Other Precautions Fall Risk;Telemetry; Chair Alarm;Cognitive  (CHAIR ALARM ACTIVE POST PT TREAT)   General   Chart Reviewed Yes   Response to Previous Treatment Patient with no complaints from previous session  Family/Caregiver Present No   Cognition   Overall Cognitive Status Impaired   Arousal/Participation Alert   Subjective   Subjective "WE CAN DO ANOTHER EXERCISE"   Transfers   Sit to Stand 4  Minimal assistance  (X 3 PERFORMANCES + 5 PERFORMANCES (5X SIT/STAND))   Additional items Assist x 1   Stand to Sit 5  Supervision   Additional items Increased time required   Ambulation/Elevation   Gait pattern Excessively slow; Foward flexed;Decreased foot clearance   Gait Assistance 4  Minimal assist   Additional items Assist x 1   Assistive Device Rolling walker   Distance 40 FEET    Balance   Static Sitting Fair   Static Standing Poor +   Ambulatory Poor   Endurance Deficit   Endurance Deficit Yes   Endurance Deficit Description WEAKNESS; FATIGUE    Activity Tolerance   Activity Tolerance Patient tolerated treatment well   Exercises   Knee AROM Long Arc Quad Sitting;10 reps;AROM; Bilateral   Ankle Pumps Sitting;10 reps;Bilateral;AROM  (10 HEEL RAISES/10 TOE RAISES)   Marching Sitting;10 reps;Bilateral;AROM   Assessment   Prognosis Good   Problem List Decreased strength;Decreased endurance; Impaired balance;Decreased mobility; Decreased cognition; Impaired judgement;Decreased safety awareness   Assessment PT INITIATED TREATMENT SESSION IN ORDER TO ASSIST PATIENT IN ACHIEIVING GOALS FOR IMPROVED TRANSFERS, AMBULATION, STRENGTH, AND OVERALL ACTIVITY TOLERANCE  SHE REQUIRED MIN-AX1 FOR SIT<-->STAND TRANSFERS  PATIENT REQUIRED 57 SECONDS TO PERFORM 5X SIT TO STAND WITH VERBAL CUING TO PUSH FROM CHAIR   HE REQUIRED CLOSE CONTACT GUARD ASSISTANCE WITH USE OF RW TO PEFORM TIMED UP AND GO (TUG) OUTCOME MEASURE  PATIENT REQUIRED 66 SECONDS WHICH IS INDICATIVE OF FALLS (>12)  PATIENT ALSO PERFORMED THER-EX AS DESCRIBED ABOVE  RECOMMENDATION FOR STR REMAINS APPROPRIATE IS PATIENT REMAINS HIGH FALLS RISK AND RESIDES ALONE  HE WILL BENEFIT FROM CONTINUED SKILLED INPT PT THIS ADMISSION TO ACHIEVE MAXIMAL FUNCTION AND SAFETY  Barriers to Discharge Decreased caregiver support   Goals   Patient Goals "WE CAN DO ONE MORE EXERCISE"   LTG Expiration Date 12/25/17   Treatment Day 1   Plan   Treatment/Interventions Functional transfer training;LE strengthening/ROM; Elevations; Therapeutic exercise; Endurance training;OT;Gait training;Bed mobility; Equipment eval/education   Progress Progressing toward goals   PT Frequency 5x/wk  (BID PRN )   Recommendation   Recommendation Short-term skilled PT   Equipment Recommended Walker  (RW)   PT - OK to Discharge Yes  (TO STR WHEN MED SYLWIA )   Efra Lehman, PT

## 2017-12-11 NOTE — PLAN OF CARE
Problem: PHYSICAL THERAPY ADULT  Goal: Performs mobility at highest level of function for planned discharge setting  See evaluation for individualized goals  Treatment/Interventions: Functional transfer training, Elevations, LE strengthening/ROM, Therapeutic exercise, Endurance training, Gait training, Bed mobility, OT, Equipment eval/education  Equipment Recommended: Walker (RW)       See flowsheet documentation for full assessment, interventions and recommendations  Sekou Garcia PT     Outcome: Progressing  Prognosis: Good  Problem List: Decreased strength, Decreased endurance, Impaired balance, Decreased mobility, Decreased cognition, Impaired judgement, Decreased safety awareness  Assessment: PT INITIATED TREATMENT SESSION IN ORDER TO ASSIST PATIENT IN ACHIEIVING GOALS FOR IMPROVED TRANSFERS, AMBULATION, STRENGTH, AND OVERALL ACTIVITY TOLERANCE  SHE REQUIRED MIN-AX1 FOR SIT<-->STAND TRANSFERS  PATIENT REQUIRED 57 SECONDS TO PERFORM 5X SIT TO STAND WITH VERBAL CUING TO PUSH FROM CHAIR  HE REQUIRED CLOSE CONTACT GUARD ASSISTANCE WITH USE OF RW TO PEFORM TIMED UP AND GO (TUG) OUTCOME MEASURE  PATIENT REQUIRED 66 SECONDS WHICH IS INDICATIVE OF FALLS (>12)  PATIENT ALSO PERFORMED THER-EX AS DESCRIBED ABOVE  RECOMMENDATION FOR STR REMAINS APPROPRIATE IS PATIENT REMAINS HIGH FALLS RISK AND RESIDES ALONE  HE WILL BENEFIT FROM CONTINUED SKILLED INPT PT THIS ADMISSION TO ACHIEVE MAXIMAL FUNCTION AND SAFETY  Barriers to Discharge: Decreased caregiver support  Barriers to Discharge Comments: RESIDES ALONE  Recommendation: (S) Short-term skilled PT     PT - OK to Discharge: (S) Yes (TO STR WHEN MED SYLWIA )    See flowsheet documentation for full assessment     Sekou Garcia PT
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
## 2017-12-12 VITALS
WEIGHT: 210.76 LBS | OXYGEN SATURATION: 97 % | TEMPERATURE: 99.2 F | BODY MASS INDEX: 38.78 KG/M2 | DIASTOLIC BLOOD PRESSURE: 92 MMHG | HEIGHT: 62 IN | RESPIRATION RATE: 18 BRPM | HEART RATE: 88 BPM | SYSTOLIC BLOOD PRESSURE: 158 MMHG

## 2017-12-12 LAB — METHYLMALONATE SERPL-SCNC: 386 NMOL/L (ref 0–378)

## 2017-12-12 RX ORDER — DOCUSATE SODIUM 100 MG/1
100 CAPSULE, LIQUID FILLED ORAL 2 TIMES DAILY
Qty: 60 CAPSULE | Refills: 0 | Status: SHIPPED | OUTPATIENT
Start: 2017-12-12 | End: 2017-12-12

## 2017-12-12 RX ORDER — AMLODIPINE BESYLATE 5 MG/1
5 TABLET ORAL DAILY
Qty: 30 TABLET | Refills: 0 | Status: SHIPPED | OUTPATIENT
Start: 2017-12-13 | End: 2017-12-12

## 2017-12-12 RX ORDER — DOCUSATE SODIUM 100 MG/1
100 CAPSULE, LIQUID FILLED ORAL 2 TIMES DAILY
Qty: 60 CAPSULE | Refills: 0
Start: 2017-12-12 | End: 2019-04-11

## 2017-12-12 RX ORDER — AMLODIPINE BESYLATE 5 MG/1
5 TABLET ORAL DAILY
Qty: 30 TABLET | Refills: 0 | Status: ON HOLD
Start: 2017-12-13 | End: 2019-04-15 | Stop reason: SDUPTHER

## 2017-12-12 RX ADMIN — ACETAMINOPHEN 650 MG: 325 TABLET, FILM COATED ORAL at 11:23

## 2017-12-12 RX ADMIN — HEPARIN SODIUM 5000 UNITS: 5000 INJECTION, SOLUTION INTRAVENOUS; SUBCUTANEOUS at 01:18

## 2017-12-12 RX ADMIN — HEPARIN SODIUM 5000 UNITS: 5000 INJECTION, SOLUTION INTRAVENOUS; SUBCUTANEOUS at 09:36

## 2017-12-12 RX ADMIN — ACETAMINOPHEN 650 MG: 325 TABLET, FILM COATED ORAL at 05:24

## 2017-12-12 RX ADMIN — AMLODIPINE BESYLATE 5 MG: 5 TABLET ORAL at 09:36

## 2017-12-12 RX ADMIN — DOCUSATE SODIUM 100 MG: 100 CAPSULE, LIQUID FILLED ORAL at 09:36

## 2017-12-12 RX ADMIN — ACETAMINOPHEN 650 MG: 325 TABLET, FILM COATED ORAL at 01:18

## 2017-12-12 NOTE — DISCHARGE INSTRUCTIONS
Acute Kidney Injury   WHAT YOU NEED TO KNOW:   Acute kidney injury (GRIFFIN) is also called acute kidney failure, or acute renal failure  GRIFFIN happens when your kidneys suddenly stop working correctly  Normally, the kidneys remove fluid, chemicals, and waste from your blood  These wastes are turned into urine by your kidneys  GRIFFIN usually happens over hours or days  When you have GRIFFIN, your kidneys do not remove the waste, chemicals, or extra fluid from your body  A normal amount of urine is not produced  GRIFFIN is usually temporary, it can take days to months to recover  GRIFFIN can also become a chronic kidney condition  DISCHARGE INSTRUCTIONS:   Call 911 if:   · You have sudden chest pain or trouble breathing  Seek care immediately if:   · Your symptoms get worse  Contact your healthcare provider if:   · Your symptoms return  · Your blood sugar or blood pressure level is not within the range your healthcare provider recommends  · You have questions or concerns about your condition or care  Nutrition:  Your healthcare provider may tell you to eat food low in sodium (salt), potassium, phosphorus, or protein  A dietitian can help you plan your meals  Drink liquids as directed: Your healthcare provider may recommend that you drink a certain amount of liquids  This will help your kidneys work better and decrease your risk for dehydration  Ask how much liquid to drink each day and which liquids are best for you  Prevent acute kidney injury:   · Manage other health conditions  such as diabetes, high blood pressure, or heart disease  These conditions increase your risk for acute kidney injury  Take your medicines for these conditions as directed  Also, monitor your blood sugar and blood pressure levels as directed  Contact your healthcare provider if your levels are not in the range he or she says it should be  · Talk to your healthcare provider before you take over-the-counter-medicine    NSAIDs, stomach medicine, or laxatives may harm your kidneys and increase your risk for acute kidney injury  If it is okay to take the medicine, follow the directions on the package  Do not take more than directed  · Tell healthcare providers you have had acute kidney injury  before you get contrast liquid for an x-ray or CT scan  Your healthcare provider may give you medicine to prevent kidney problems caused by the liquid  Follow up with your healthcare provider as directed: You will need to return for more tests to make sure your kidneys are working properly  You may also be referred to a kidney specialist  Write down your questions so you remember to ask them during your visits  © 2017 [address] [facility] Information is for End User's use only and may not be sold, redistributed or otherwise used for commercial purposes  All illustrations and images included in CareNotes® are the copyrighted property of [employer]  or [address]  The above information is an  only  It is not intended as medical advice for individual conditions or treatments  Talk to your doctor, nurse or pharmacist before following any medical regimen to see if it is safe and effective for you
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
## 2017-12-12 NOTE — SOCIAL WORK
CM was informed that pt is medically stable for dc today  Met with pt to discuss same  CM discussed WC Van transport and cost--pt is agreeable  CM arranged with Nikolay Shukla for a 7pm dc to [facility]  AND [place] [facility]  CM notified ptDeja at [facility], and pts bedside [place] of dc time  Message left for pts friend Mary Petties [phone] to inform of dc time  Facility transfer form completed  Chart copy requested

## 2017-12-12 NOTE — RESTORATIVE TECHNICIAN NOTE
Restorative Specialist Mobility Note       Activity: Ambulate in [place], Ambulate in room, Bathroom privileges, Chair, Dangle, Stand at bedside (Educated/encouraged pt to ambulate w/assistance 3-4 x's/day  Chair alarm on   Pt callbell, phone/tray within reach )     Assistive Device: Front wheel walker       Lesly MANNING, Restorative Technician, [employer] [facility]

## 2017-12-13 NOTE — DISCHARGE SUMMARY
[facility] CENTRAL Discharge Summary - Medical Marifer Rodriguez 78 y o  male MRN: [id number]    Neto Harrington  Room / Bed: [facility] 714/[facility] 612-31 Encounter: [id number]    BRIEF OVERVIEW    Admitting Provider: Kathleen Deutsch DO  Discharge Provider: Shobha Upton MD   Primary Care Physician at Discharge: To establish PCP at clinic     [facility]  [facility] [place] [facility]     Admission Date: 12/5/2017     Discharge Date: 12/12/2017  7:35 PM    Primary Discharge Diagnosis  Principal Problem:    Ambulatory dysfunction  Active Problems:    Multiple injuries    Falls frequently    Rhabdomyolysis    GRIFFIN (acute kidney injury) ([facility] [address] )    Lactic acid acidosis    SIRS (systemic inflammatory response syndrome) ([facility])    Prediabetes    Non-ST elevation myocardial infarction (NSTEMI), type 2 (HCC)    HTN (hypertension)    Mild cognitive impairment    Morbid obesity with BMI of 45 0-49 9, adult ([facility] [address] )  Resolved Problems:    * No resolved hospital problems  *      Consulting Providers   Neurology - Dr Mckenna Floyd Procedures Performed  MRI knee left  wo contrast   Final Result by Alfie Robledo MD (12/08 1030)      Evaluation slightly limited by motion artifact  Minimal prepatellar and proximal pretibial subcutaneous edema/contusion  No acute fracture  Cleavage tear middle and peripheral 3rd posterior horn medial meniscus  Ligaments grossly intact  Mild tricompartmental osteoarthropathy  Incidentally noted multipartite patella  See above discussion  Workstation performed: [id number]         XR knee 4+ vw left injury   Final Result by Marvin Hale MD (12/06 1013)      No acute osseous abnormality           Workstation performed: [id number]         XR thoracic spine 2 views   ED Interpretation by Nj Miller MD (12/05 2137)   No fractures noted      Final Result by Bernardino Moctezuma MD (12/06 7886)      Minor wedge compression deformity of midthoracic vertebral bodies, probably chronic  No convincing radiographic evidence of acute fracture or traumatic subluxation  Degenerative changes as evidenced by bulky marginal osteophytes  Workstation performed: [id number]         XR knee 1 or 2 views right   Final Result by Wayne Jackson MD (12/06 4506)      Right knee osteoarthritis, no acute osseous abnormality         Workstation performed: [id number]         XR shoulder 2+ views RIGHT   Final Result by Wayne Jackson MD (12/06 0820)      Significant narrowing of the acromiohumeral interspace, suggesting rotator cuff tear/deficiency  Acromial spurring  No acute fracture  Workstation performed: [id number]         XR humerus RIGHT   ED Interpretation by Jose Pittman MD (12/05 2137)   No acute fracture/dislocation      Final Result by Wayne Jackson MD (12/06 2537)      No acute osseous abnormality  Workstation performed: [id number]         XR elbow 3+ views RIGHT   ED Interpretation by Jose Pittman MD (12/05 2137)   No acute fracture/dislocation      Final Result by Wayne Jackson MD (12/06 8033)      No acute osseous abnormality  Workstation performed: [id number]         XR chest 1 view   Final Result by Wayne Jackson MD (12/06 0818)      No active pulmonary disease  Workstation performed: [id number]         CT spine cervical without contrast   Final Result by Hakeem Thrasher DO (12/05 2050)      No cervical spine fracture or traumatic malalignment  Workstation performed: [id number]         CT head without contrast   Final Result by Hakeem Thrasher DO (12/05 2046)      No acute intracranial abnormality  Workstation performed: [id number]             Discharge Disposition: Released to SNF/TCU/SNU Facility    Outpatient Follow-Up  1  Follow-up with orthopedics when d/jocelyn from SNF  2   Follow-up with PCP when d/jocelyn from SNF      Medications   Discharge Medication List as of 12/12/2017  4:21 PM      CONTINUE these medications which have CHANGED    Details   amLODIPine (NORVASC) 5 mg tablet Take 1 tablet by mouth daily, Starting Wed 12/13/2017, No Print      docusate sodium (COLACE) 100 mg capsule Take 1 capsule by mouth 2 (two) times a day for 30 days, Starting Tue 12/12/2017, Until Thu 1/11/2018, No Print           Discharge Medication List as of 12/12/2017  4:21 PM        Allergies  No Known Allergies  Discharge Diet: regular diet  Activity restrictions: none    [address] [facility]    Presenting Problem/History of Present Illness  Principal Problem:    Ambulatory dysfunction  Active Problems:    Multiple injuries    Falls frequently    Rhabdomyolysis    GRIFFIN (acute kidney injury) ([facility] [address] )    Lactic acid acidosis    SIRS (systemic inflammatory response syndrome) ([facility])    Prediabetes    Non-ST elevation myocardial infarction (NSTEMI), type 2 (HCC)    HTN (hypertension)    Mild cognitive impairment    Morbid obesity with BMI of 45 0-49 9, adult ([facility] [address] )  Resolved Problems:    * No resolved hospital problems  *    The patient is a 68-year-old male with a past medical history of hypertension, mild cognitive impairment who presented to [facility] after being found down after fall at home  Per the patient's friend he went to the patient's apartment after he did not answer the phone and found the patient lying supine on the floor near the radiator, called EMS  On evaluation in the ED the patient was alert and communicative, patient reported several falls per day at home secondary to left knee "giving out " Denied LOC, denied hitting head  His CTH/CT-cspine were negative for injury  He additionally had XR R elbow, t-spine, humerus which were non-revealing  XR Shoulder w/ possible rotator cuff tear  On initial eval he additionally had elevated CPK with GRIFFIN 2/2 rhabdomyolysis from prolonged down-time/direct trauma from fall   He was started on fluids, CPK downtrended and GRIFFIN resolved  Neurology was consulted given concern for ataxia in setting of multiple falls  Per neuro there wasno evidence of ataxia on exam, suspected mechanical fall likely secondary to left knee pain  Mild large fiber neuropathy noted on exam, unlikely contributing to gait  B12 low/normal, MMA pending at discharge  Per neuro did not recommend further neurological imaging  MRI of the left knee was performed which revealed cleavage tear middle and peripheral 3rd posterior horn medial meniscus  Ortho was consulted and performed left knee CSI  The patient subsequently reported sustained improvement in his L knee pain  Note of additional diagnosis/management: Noted to have trop bump on admission to [facility] c/w NSTEMI type 2, possibly 2/2 rhabdo, no chest pain  Noted to have A1c of 6 4, recommended lifestyle modification  SBP trending 140s-180s, started on amlopdipine 5mg PO QD  Discharge Condition: good    Discharge  Statement   I spent 45 minutes minutes discharging the patient  This time was spent on the day of discharge  I had direct contact with the patient on the day of discharge  Additional documentation is required if more than 30 minutes were spent on discharge

## 2017-12-14 ENCOUNTER — GENERIC CONVERSION - ENCOUNTER (OUTPATIENT)
Dept: OTHER | Facility: OTHER | Age: 67
End: 2017-12-14

## 2018-01-23 NOTE — RESULT NOTES
Verified Results  (1) VITAMIN D PANEL, INCLUDES D25, D2, D3 [id number] 11:43PM Tata Diana     Test Name Result Flag Reference   METHYLMALONIC ACID 386 nmol/L H 0 - 378   Performed at:  [address]  [id number]  : Rizwana Vega MD, Phone:  [phone]

## 2019-04-11 ENCOUNTER — APPOINTMENT (EMERGENCY)
Dept: RADIOLOGY | Facility: HOSPITAL | Age: 69
DRG: 065 | End: 2019-04-11
Payer: MEDICARE

## 2019-04-11 ENCOUNTER — HOSPITAL ENCOUNTER (INPATIENT)
Facility: HOSPITAL | Age: 69
LOS: 3 days | Discharge: NURSING, REHAB OR LONG-TERM CARE | DRG: 065 | End: 2019-04-15
Attending: EMERGENCY MEDICINE | Admitting: INTERNAL MEDICINE
Payer: MEDICARE

## 2019-04-11 DIAGNOSIS — M25.562 LEFT KNEE PAIN: ICD-10-CM

## 2019-04-11 DIAGNOSIS — T14.8XXA MULTIPLE WOUNDS OF SKIN: ICD-10-CM

## 2019-04-11 DIAGNOSIS — I63.9 THALAMIC INFARCT, ACUTE (HCC): ICD-10-CM

## 2019-04-11 DIAGNOSIS — I10 ESSENTIAL HYPERTENSION: ICD-10-CM

## 2019-04-11 DIAGNOSIS — I63.9 ISCHEMIC STROKE (HCC): Primary | ICD-10-CM

## 2019-04-11 DIAGNOSIS — R53.1 LEFT-SIDED WEAKNESS: ICD-10-CM

## 2019-04-11 PROBLEM — N17.9 AKI (ACUTE KIDNEY INJURY) (HCC): Status: RESOLVED | Noted: 2017-12-05 | Resolved: 2019-04-11

## 2019-04-11 PROBLEM — M62.82 RHABDOMYOLYSIS: Status: RESOLVED | Noted: 2017-12-05 | Resolved: 2019-04-11

## 2019-04-11 PROBLEM — E87.2 LACTIC ACID ACIDOSIS: Status: RESOLVED | Noted: 2017-12-05 | Resolved: 2019-04-11

## 2019-04-11 PROBLEM — M25.569 KNEE PAIN: Status: ACTIVE | Noted: 2019-04-11

## 2019-04-11 PROBLEM — E87.20 LACTIC ACID ACIDOSIS: Status: RESOLVED | Noted: 2017-12-05 | Resolved: 2019-04-11

## 2019-04-11 PROBLEM — R65.10 SIRS (SYSTEMIC INFLAMMATORY RESPONSE SYNDROME) (HCC): Status: RESOLVED | Noted: 2017-12-05 | Resolved: 2019-04-11

## 2019-04-11 LAB
ALBUMIN SERPL BCP-MCNC: 4.1 G/DL (ref 3.5–5)
ALP SERPL-CCNC: 113 U/L (ref 46–116)
ALT SERPL W P-5'-P-CCNC: 16 U/L (ref 12–78)
ANION GAP SERPL CALCULATED.3IONS-SCNC: 10 MMOL/L (ref 4–13)
AST SERPL W P-5'-P-CCNC: 14 U/L (ref 5–45)
BASOPHILS # BLD AUTO: 0.05 THOUSANDS/ΜL (ref 0–0.1)
BASOPHILS NFR BLD AUTO: 0 % (ref 0–1)
BILIRUB SERPL-MCNC: 0.47 MG/DL (ref 0.2–1)
BUN SERPL-MCNC: 10 MG/DL (ref 5–25)
CALCIUM SERPL-MCNC: 9 MG/DL (ref 8.3–10.1)
CHLORIDE SERPL-SCNC: 109 MMOL/L (ref 100–108)
CO2 SERPL-SCNC: 23 MMOL/L (ref 21–32)
CREAT SERPL-MCNC: 1.18 MG/DL (ref 0.6–1.3)
EOSINOPHIL # BLD AUTO: 0 THOUSAND/ΜL (ref 0–0.61)
EOSINOPHIL NFR BLD AUTO: 0 % (ref 0–6)
ERYTHROCYTE [DISTWIDTH] IN BLOOD BY AUTOMATED COUNT: 14.3 % (ref 11.6–15.1)
GFR SERPL CREATININE-BSD FRML MDRD: 63 ML/MIN/1.73SQ M
GLUCOSE SERPL-MCNC: 107 MG/DL (ref 65–140)
HCT VFR BLD AUTO: 43.7 % (ref 36.5–49.3)
HGB BLD-MCNC: 14 G/DL (ref 12–17)
IMM GRANULOCYTES # BLD AUTO: 0.05 THOUSAND/UL (ref 0–0.2)
IMM GRANULOCYTES NFR BLD AUTO: 0 % (ref 0–2)
INR PPP: 1.1 (ref 0.86–1.17)
LYMPHOCYTES # BLD AUTO: 1.16 THOUSANDS/ΜL (ref 0.6–4.47)
LYMPHOCYTES NFR BLD AUTO: 9 % (ref 14–44)
MCH RBC QN AUTO: 29.3 PG (ref 26.8–34.3)
MCHC RBC AUTO-ENTMCNC: 32 G/DL (ref 31.4–37.4)
MCV RBC AUTO: 91 FL (ref 82–98)
MONOCYTES # BLD AUTO: 0.95 THOUSAND/ΜL (ref 0.17–1.22)
MONOCYTES NFR BLD AUTO: 7 % (ref 4–12)
NEUTROPHILS # BLD AUTO: 11.14 THOUSANDS/ΜL (ref 1.85–7.62)
NEUTS SEG NFR BLD AUTO: 84 % (ref 43–75)
NRBC BLD AUTO-RTO: 0 /100 WBCS
PLATELET # BLD AUTO: 314 THOUSANDS/UL (ref 149–390)
PMV BLD AUTO: 10.7 FL (ref 8.9–12.7)
POTASSIUM SERPL-SCNC: 3.6 MMOL/L (ref 3.5–5.3)
PROT SERPL-MCNC: 7.5 G/DL (ref 6.4–8.2)
PROTHROMBIN TIME: 14.3 SECONDS (ref 11.8–14.2)
RBC # BLD AUTO: 4.78 MILLION/UL (ref 3.88–5.62)
SODIUM SERPL-SCNC: 142 MMOL/L (ref 136–145)
TROPONIN I SERPL-MCNC: <0.02 NG/ML
WBC # BLD AUTO: 13.35 THOUSAND/UL (ref 4.31–10.16)

## 2019-04-11 PROCEDURE — 99285 EMERGENCY DEPT VISIT HI MDM: CPT

## 2019-04-11 PROCEDURE — 36415 COLL VENOUS BLD VENIPUNCTURE: CPT | Performed by: EMERGENCY MEDICINE

## 2019-04-11 PROCEDURE — 84443 ASSAY THYROID STIM HORMONE: CPT | Performed by: INTERNAL MEDICINE

## 2019-04-11 PROCEDURE — 84484 ASSAY OF TROPONIN QUANT: CPT | Performed by: EMERGENCY MEDICINE

## 2019-04-11 PROCEDURE — 85610 PROTHROMBIN TIME: CPT | Performed by: EMERGENCY MEDICINE

## 2019-04-11 PROCEDURE — 70496 CT ANGIOGRAPHY HEAD: CPT

## 2019-04-11 PROCEDURE — 70498 CT ANGIOGRAPHY NECK: CPT

## 2019-04-11 PROCEDURE — 99285 EMERGENCY DEPT VISIT HI MDM: CPT | Performed by: EMERGENCY MEDICINE

## 2019-04-11 PROCEDURE — 93005 ELECTROCARDIOGRAM TRACING: CPT

## 2019-04-11 PROCEDURE — 80053 COMPREHEN METABOLIC PANEL: CPT | Performed by: EMERGENCY MEDICINE

## 2019-04-11 PROCEDURE — 73564 X-RAY EXAM KNEE 4 OR MORE: CPT

## 2019-04-11 PROCEDURE — 71046 X-RAY EXAM CHEST 2 VIEWS: CPT

## 2019-04-11 PROCEDURE — 85025 COMPLETE CBC W/AUTO DIFF WBC: CPT | Performed by: EMERGENCY MEDICINE

## 2019-04-11 RX ORDER — HEPARIN SODIUM 5000 [USP'U]/ML
5000 INJECTION, SOLUTION INTRAVENOUS; SUBCUTANEOUS EVERY 8 HOURS SCHEDULED
Status: DISCONTINUED | OUTPATIENT
Start: 2019-04-11 | End: 2019-04-15 | Stop reason: HOSPADM

## 2019-04-11 RX ORDER — SODIUM CHLORIDE 9 MG/ML
125 INJECTION, SOLUTION INTRAVENOUS CONTINUOUS
Status: DISCONTINUED | OUTPATIENT
Start: 2019-04-11 | End: 2019-04-11

## 2019-04-11 RX ORDER — ONDANSETRON 2 MG/ML
4 INJECTION INTRAMUSCULAR; INTRAVENOUS EVERY 6 HOURS PRN
Status: DISCONTINUED | OUTPATIENT
Start: 2019-04-11 | End: 2019-04-11

## 2019-04-11 RX ORDER — ACETAMINOPHEN 325 MG/1
975 TABLET ORAL ONCE
Status: COMPLETED | OUTPATIENT
Start: 2019-04-11 | End: 2019-04-11

## 2019-04-11 RX ORDER — ASPIRIN 81 MG/1
81 TABLET, CHEWABLE ORAL DAILY
Status: DISCONTINUED | OUTPATIENT
Start: 2019-04-12 | End: 2019-04-15 | Stop reason: HOSPADM

## 2019-04-11 RX ORDER — ATORVASTATIN CALCIUM 40 MG/1
40 TABLET, FILM COATED ORAL EVERY EVENING
Status: DISCONTINUED | OUTPATIENT
Start: 2019-04-11 | End: 2019-04-11

## 2019-04-11 RX ORDER — ASPIRIN 325 MG
325 TABLET ORAL ONCE
Status: COMPLETED | OUTPATIENT
Start: 2019-04-11 | End: 2019-04-11

## 2019-04-11 RX ORDER — ACETAMINOPHEN 325 MG/1
975 TABLET ORAL ONCE
Status: DISCONTINUED | OUTPATIENT
Start: 2019-04-11 | End: 2019-04-11

## 2019-04-11 RX ORDER — ATORVASTATIN CALCIUM 80 MG/1
80 TABLET, FILM COATED ORAL
Status: DISCONTINUED | OUTPATIENT
Start: 2019-04-12 | End: 2019-04-15 | Stop reason: HOSPADM

## 2019-04-11 RX ORDER — ASPIRIN 325 MG
325 TABLET ORAL ONCE
Status: DISCONTINUED | OUTPATIENT
Start: 2019-04-11 | End: 2019-04-11

## 2019-04-11 RX ORDER — CLOPIDOGREL BISULFATE 75 MG/1
75 TABLET ORAL DAILY
Status: DISCONTINUED | OUTPATIENT
Start: 2019-04-12 | End: 2019-04-11

## 2019-04-11 RX ORDER — ACETAMINOPHEN 325 MG/1
650 TABLET ORAL EVERY 4 HOURS PRN
Status: DISCONTINUED | OUTPATIENT
Start: 2019-04-11 | End: 2019-04-15 | Stop reason: HOSPADM

## 2019-04-11 RX ORDER — AMLODIPINE BESYLATE 10 MG/1
10 TABLET ORAL DAILY
Status: DISCONTINUED | OUTPATIENT
Start: 2019-04-12 | End: 2019-04-11

## 2019-04-11 RX ADMIN — HEPARIN SODIUM 5000 UNITS: 5000 INJECTION INTRAVENOUS; SUBCUTANEOUS at 22:44

## 2019-04-11 RX ADMIN — ASPIRIN 325 MG ORAL TABLET 325 MG: 325 PILL ORAL at 22:44

## 2019-04-11 RX ADMIN — ACETAMINOPHEN 975 MG: 325 TABLET ORAL at 17:12

## 2019-04-11 RX ADMIN — SODIUM CHLORIDE 125 ML/HR: 0.9 INJECTION, SOLUTION INTRAVENOUS at 22:44

## 2019-04-11 RX ADMIN — IOHEXOL 85 ML: 350 INJECTION, SOLUTION INTRAVENOUS at 19:34

## 2019-04-11 NOTE — ED ATTENDING ATTESTATION
John Mcintosh MD, saw and evaluated the patient  I have discussed the patient with the resident/non-physician practitioner and agree with the resident's/non-physician practitioner's findings, Plan of Care, and MDM as documented in the resident's/non-physician practitioner's note, except where noted  All available labs and Radiology studies were reviewed  I was present for key portions of any procedure(s) performed by the resident/non-physician practitioner and I was immediately available to provide assistance  At this point I agree with the current assessment done in the Emergency Department  I have conducted an independent evaluation of this patient a history and physical is as follows: Pt states that he was leaning against pole and slid to ground states that he twisted knee co pain denies other injury Pt states bystanders called the ambulance He is not clear why he is here   PE; alert nad heart reg lungs clear abd soft nontender neuro nonfocal L knee no effusion minimal tenderness MDM: will xray and ambulate with reevaluation      Critical Care Time  Procedures

## 2019-04-11 NOTE — ED PROVIDER NOTES
History  Chief Complaint   Patient presents with    Knee Pain     pt was walking home from [employer] store and stood against a telephone pole and slid down it  pt reports L knee pain, denies LO, denies head strike  70year old male presents to ED for left knee injury  Patient says that he was leaning against a telephone pole when his left knee twisted and he developed pain over the patella  No fall or head injury  He has been ambulating with his walker since the injury but with increased pain  He denies popping sensation, significant swelling, or any additional injuries or complaints  He says he has remote history of surgery to that knee (unsure what type of surgery) but no hardware was placed  Prior to Admission Medications   Prescriptions Last Dose Informant Patient Reported? Taking? amLODIPine (NORVASC) 5 mg tablet   No No   Sig: Take 1 tablet by mouth daily      Facility-Administered Medications: None       Past Medical History:   Diagnosis Date    Hypertension     MCI (mild cognitive impairment)        History reviewed  No pertinent surgical history  History reviewed  No pertinent family history  I have reviewed and agree with the history as documented  Social History     Tobacco Use    Smoking status: Never Smoker    Smokeless tobacco: Never Used   Substance Use Topics    Alcohol use: Not Currently    Drug use: No        Review of Systems   Constitutional: Negative  Negative for chills and fever  HENT: Negative  Negative for congestion and rhinorrhea  Eyes: Negative  Respiratory: Negative  Negative for cough and shortness of breath  Cardiovascular: Negative  Negative for chest pain and leg swelling  Gastrointestinal: Negative  Negative for abdominal distention, abdominal pain, diarrhea, nausea and vomiting  Musculoskeletal: Negative for back pain and neck pain  Left knee pain   Skin: Negative  Negative for rash  Neurological: Negative    Negative for light-headedness and headaches  Hematological: Negative  All other systems reviewed and are negative  Physical Exam  ED Triage Vitals   Temperature Pulse Respirations Blood Pressure SpO2   04/11/19 1618 04/11/19 1618 04/11/19 1618 04/11/19 1618 04/11/19 1618   99 1 °F (37 3 °C) 104 20 (!) 176/95 98 %      Temp Source Heart Rate Source Patient Position - Orthostatic VS BP Location FiO2 (%)   04/11/19 1618 04/11/19 1618 04/11/19 1618 04/11/19 1618 --   Oral Monitor Lying Right arm       Pain Score       04/11/19 1712       Worst Possible Pain             Orthostatic Vital Signs  Vitals:    04/12/19 1835 04/12/19 1935 04/12/19 2235 04/13/19 0828   BP: (!) 172/70 150/93 145/92 165/93   Pulse: 78 88 84 83   Patient Position - Orthostatic VS: Lying Lying Lying Lying       Physical Exam   Constitutional: He is oriented to person, place, and time  He appears well-developed and well-nourished  No distress  HENT:   Head: Normocephalic and atraumatic  Mouth/Throat: Oropharynx is clear and moist    Eyes: EOM are normal    Neck: Normal range of motion  Neck supple  Cardiovascular: Normal rate, regular rhythm, normal heart sounds and intact distal pulses  Exam reveals no gallop and no friction rub  No murmur heard  Pulmonary/Chest: Effort normal and breath sounds normal  No respiratory distress  He has no wheezes  He has no rales  Abdominal: Soft  There is no tenderness  There is no rebound and no guarding  Musculoskeletal: He exhibits tenderness (mildly tender over left patella  No significant edema  Full active ROM  No overlying redness or warmth  No joint instability  )  He exhibits no edema  Neurological: He is alert and oriented to person, place, and time  No sensory deficit  He exhibits normal muscle tone  4/5 strength in LUE, 4+/5 strength in LLE  5/5 strength in RUE and RLE  Possible mild left facial droop  CN otherwise intact  Resting hand tremor bilaterally, worse with extension   Normal finger nose finger  Normal heel to shin  Visual fields intact  Skin: Skin is warm and dry  Capillary refill takes less than 2 seconds  Psychiatric: He has a normal mood and affect  Nursing note and vitals reviewed        ED Medications  Medications   atorvastatin (LIPITOR) tablet 80 mg (80 mg Oral Given 4/12/19 1754)   acetaminophen (TYLENOL) tablet 650 mg (has no administration in time range)   heparin (porcine) subcutaneous injection 5,000 Units (5,000 Units Subcutaneous Given 4/13/19 1426)   aspirin chewable tablet 81 mg (81 mg Oral Given 4/13/19 0938)   clopidogrel (PLAVIX) tablet 75 mg (75 mg Oral Given 4/13/19 0938)   amLODIPine (NORVASC) tablet 5 mg (5 mg Oral Given 4/13/19 0938)   acetaminophen (TYLENOL) tablet 975 mg (975 mg Oral Given 4/11/19 1712)   iohexol (OMNIPAQUE) 350 MG/ML injection (MULTI-DOSE) 85 mL (85 mL Intravenous Given 4/11/19 1934)   aspirin tablet 325 mg (325 mg Oral Given 4/11/19 2244)   gadobutrol injection (MULTI-DOSE) SOLN 10 mL (10 mL Intravenous Given 4/12/19 0607)   clopidogrel (PLAVIX) tablet 600 mg (600 mg Oral Given 4/12/19 1237)       Diagnostic Studies  Results Reviewed     Procedure Component Value Units Date/Time    CBC and differential [[id number]]  (Abnormal) Collected:  04/13/19 0556    Lab Status:  Final result Specimen:  Blood from Hand, Right Updated:  04/13/19 0713     WBC 6 83 Thousand/uL      RBC 4 90 Million/uL      Hemoglobin 14 5 g/dL      Hematocrit 47 2 %      MCV 96 fL      MCH 29 6 pg      MCHC 30 7 g/dL      RDW 14 5 %      MPV 11 1 fL      Platelets 785 Thousands/uL      nRBC 0 /100 WBCs      Neutrophils Relative 65 %      Immat GRANS % 0 %      Lymphocytes Relative 21 %      Monocytes Relative 12 %      Eosinophils Relative 1 %      Basophils Relative 1 %      Neutrophils Absolute 4 48 Thousands/µL      Immature Grans Absolute 0 01 Thousand/uL      Lymphocytes Absolute 1 43 Thousands/µL      Monocytes Absolute 0 79 Thousand/µL      Eosinophils Absolute 0 07 Thousand/µL      Basophils Absolute 0 05 Thousands/µL     Basic metabolic panel [[id number]]  (Abnormal) Collected:  04/13/19 0556    Lab Status:  Final result Specimen:  Blood from Hand, Right Updated:  04/13/19 0655     Sodium 139 mmol/L      Potassium 3 9 mmol/L      Chloride 111 mmol/L      CO2 22 mmol/L      ANION GAP 6 mmol/L      BUN 16 mg/dL      Creatinine 0 96 mg/dL      Glucose 93 mg/dL      Calcium 8 8 mg/dL      eGFR 80 ml/min/1 73sq m     Narrative:       [facility] guidelines for Chronic Kidney Disease (CKD):     Stage 1 with normal or high GFR (GFR > 90 mL/min/1 73 square meters)    Stage 2 Mild CKD (GFR = 60-89 mL/min/1 73 square meters)    Stage 3A Moderate CKD (GFR = 45-59 mL/min/1 73 square meters)    Stage 3B Moderate CKD (GFR = 30-44 mL/min/1 73 square meters)    Stage 4 Severe CKD (GFR = 15-29 mL/min/1 73 square meters)    Stage 5 End Stage CKD (GFR <15 mL/min/1 73 square meters)  Note: GFR calculation is accurate only with a steady state creatinine    Methylmalonic acid, serum [[id number]] Collected:  04/13/19 0556    Lab Status:   In process Specimen:  Blood from Hand, Right Updated:  04/13/19 0636    Lipid Panel with Direct LDL reflex [[id number]]  (Abnormal) Collected:  04/12/19 0449    Lab Status:  Final result Specimen:  Blood from Arm, Right Updated:  04/12/19 0614     Cholesterol 182 mg/dL      Triglycerides 62 mg/dL      HDL, Direct 52 mg/dL      LDL Calculated 118 mg/dL     Basic metabolic panel [[id number]]  (Abnormal) Collected:  04/12/19 0449    Lab Status:  Final result Specimen:  Blood from Arm, Right Updated:  04/12/19 7584     Sodium 142 mmol/L      Potassium 3 7 mmol/L      Chloride 110 mmol/L      CO2 25 mmol/L      ANION GAP 7 mmol/L      BUN 11 mg/dL      Creatinine 0 98 mg/dL      Glucose 93 mg/dL      Glucose, Fasting 93 mg/dL      Calcium 8 4 mg/dL      eGFR 78 ml/min/1 73sq m     Narrative:       [facility] guidelines for Chronic Kidney Disease (CKD):     Stage 1 with normal or high GFR (GFR > 90 mL/min/1 73 square meters)    Stage 2 Mild CKD (GFR = 60-89 mL/min/1 73 square meters)    Stage 3A Moderate CKD (GFR = 45-59 mL/min/1 73 square meters)    Stage 3B Moderate CKD (GFR = 30-44 mL/min/1 73 square meters)    Stage 4 Severe CKD (GFR = 15-29 mL/min/1 73 square meters)    Stage 5 End Stage CKD (GFR <15 mL/min/1 73 square meters)  Note: GFR calculation is accurate only with a steady state creatinine    Vitamin B12 [[id number]]  (Normal) Collected:  04/12/19 0449    Lab Status:  Final result Specimen:  Blood from Arm, Right Updated:  04/12/19 0614     Vitamin B-12 243 pg/mL     Folate [[id number]]  (Abnormal) Collected:  04/12/19 0449    Lab Status:  Final result Specimen:  Blood from Arm, Right Updated:  04/12/19 0614     Folate >20 0 ng/mL     Hemoglobin A1c w/EAG Estimation [[id number]] Collected:  04/12/19 0449    Lab Status:  Final result Specimen:  Blood from Arm, Right Updated:  04/12/19 0541     Hemoglobin A1C 6 2 %       mg/dl     CBC (With Platelets) [[id number]]  (Normal) Collected:  04/12/19 0449    Lab Status:  Final result Specimen:  Blood from Arm, Right Updated:  04/12/19 0502     WBC 9 26 Thousand/uL      RBC 4 68 Million/uL      Hemoglobin 13 8 g/dL      Hematocrit 42 8 %      MCV 92 fL      MCH 29 5 pg      MCHC 32 2 g/dL      RDW 14 4 %      Platelets 458 Thousands/uL      MPV 10 4 fL     TSH, 3rd generation [[id number]]  (Normal) Collected:  04/11/19 2341    Lab Status:  Final result Specimen:  Blood from Arm, Right Updated:  04/12/19 0018     TSH 3RD GENERATON 2 630 uIU/mL     Narrative:       Patients undergoing fluorescein dye angiography may retain small amounts of fluorescein in the body for 48-72 hours post procedure  Samples containing fluorescein can produce falsely depressed TSH values  If the patient had this procedure,a specimen should be resubmitted post fluorescein clearance  Protime-INR [[id number]]  (Abnormal) Collected:  04/11/19 1958    Lab Status:  Final result Specimen:  Blood from Arm, Right Updated:  04/11/19 2023     Protime 14 3 seconds      INR 1 10    Troponin I [[id number]]  (Normal) Collected:  04/11/19 1837    Lab Status:  Final result Specimen:  Blood from Arm, Right Updated:  04/11/19 1908     Troponin I <0 02 ng/mL     Comprehensive metabolic panel [[id number]]  (Abnormal) Collected:  04/11/19 1837    Lab Status:  Final result Specimen:  Blood from Arm, Right Updated:  04/11/19 1908     Sodium 142 mmol/L      Potassium 3 6 mmol/L      Chloride 109 mmol/L      CO2 23 mmol/L      ANION GAP 10 mmol/L      BUN 10 mg/dL      Creatinine 1 18 mg/dL      Glucose 107 mg/dL      Calcium 9 0 mg/dL      AST 14 U/L      ALT 16 U/L      Alkaline Phosphatase 113 U/L      Total Protein 7 5 g/dL      Albumin 4 1 g/dL      Total Bilirubin 0 47 mg/dL      eGFR 63 ml/min/1 73sq m     Narrative:       [facility] guidelines for Chronic Kidney Disease (CKD):     Stage 1 with normal or high GFR (GFR > 90 mL/min/1 73 square meters)    Stage 2 Mild CKD (GFR = 60-89 mL/min/1 73 square meters)    Stage 3A Moderate CKD (GFR = 45-59 mL/min/1 73 square meters)    Stage 3B Moderate CKD (GFR = 30-44 mL/min/1 73 square meters)    Stage 4 Severe CKD (GFR = 15-29 mL/min/1 73 square meters)    Stage 5 End Stage CKD (GFR <15 mL/min/1 73 square meters)  Note: GFR calculation is accurate only with a steady state creatinine    CBC and differential [[id number]]  (Abnormal) Collected:  04/11/19 1837    Lab Status:  Final result Specimen:  Blood from Arm, Right Updated:  04/11/19 1853     WBC 13 35 Thousand/uL      RBC 4 78 Million/uL      Hemoglobin 14 0 g/dL      Hematocrit 43 7 %      MCV 91 fL      MCH 29 3 pg      MCHC 32 0 g/dL      RDW 14 3 %      MPV 10 7 fL      Platelets 925 Thousands/uL      nRBC 0 /100 WBCs      Neutrophils Relative 84 %      Immat GRANS % 0 %      Lymphocytes Relative 9 %      Monocytes Relative 7 %      Eosinophils Relative 0 %      Basophils Relative 0 %      Neutrophils Absolute 11 14 Thousands/µL      Immature Grans Absolute 0 05 Thousand/uL      Lymphocytes Absolute 1 16 Thousands/µL      Monocytes Absolute 0 95 Thousand/µL      Eosinophils Absolute 0 00 Thousand/µL      Basophils Absolute 0 05 Thousands/µL                  VAS carotid complete study   Final Result by Alexis Malloy MD (04/12 1450)      MRI brain w wo contrast   Final Result by Ginny Paz MD (04/12 0703)      Acute right thalamus/basal ganglia infarcts      I personally discussed this study  with Dr Mohan Wilson 4/12/2019 at 8:11 AM       Workstation performed: [id number]         XR chest 2 views   Final Result by Adrian Ayala MD (04/12 0432)      No acute cardiopulmonary disease  Workstation performed: [id number]         CTA head and neck with and without contrast   Final Result by Camilla Jacobson MD (04/11 2002)      No evidence of acute intracranial hemorrhage  No evidence of hemodynamic significant stenosis, aneurysm or dissection  Old lacunar infarcts  Nonspecific 1 3 cm right paratracheal lymph node  Workstation performed: [id number]         XR knee 4+ vw left injury   ED Interpretation by Jb Sanford MD (04/11 1754)   No acute osseous injury      Final Result by Adrian Ayala MD (04/11 2119)      No acute osseous abnormality  Workstation performed: [id number]               Procedures  Procedures      Phone Consults  ED Phone Contact    ED Course  ED Course as of Apr 13 1444   Thu Apr 11, 2019   1830 On re-exam after attempting to ambulate patient he is found to have difficulty sitting up or standing and favoring the left side  He has 4/5 strength in left upper extremity and 4+/5 strength in left lower extremity  He has 5/5 strength in RUE and RLE  No other focal deficits   Patient is unsure how long he has been weak on the left side and does not know whether this is new or old  Obtaining stroke workup  Identification of Seniors at Risk      Most Recent Value   (ISAR) Identification of Seniors at Risk   Before the illness or injury that brought you to the Emergency, did you need someone to help you on a regular basis? 0 Filed at: 04/11/2019 1620   In the last 24 hours, have you needed more help than usual?  0 Filed at: 04/11/2019 1620   Have you been hospitalized for one or more nights during the past 6 months? 0 Filed at: 04/11/2019 1620   In general, do you see well?  0 Filed at: 04/11/2019 1620   In general, do you have serious problems with your memory? 0 Filed at: 04/11/2019 1620   Do you take more than three different medications every day? 1 Filed at: 04/11/2019 1620   ISAR Score  1 Filed at: 04/11/2019 1620                          MDM  Number of Diagnoses or Management Options  Left knee pain:   Left-sided weakness:   Diagnosis management comments: 70year old male presents to ED for left knee injury  Knee xray was negative but when attempting to ambulate patient noticed he was favoring the left side and had weakness in LUE and LLE  He seems unconcerned about weakness and is unsure whether it is new or old  CTA head/neck reveals old lacunar infarct but nothing acute  Admitted to SOD for further management and stroke workup  He remains stable throughout ED course         Disposition  Final diagnoses:   Left knee pain   Left-sided weakness     Time reflects when diagnosis was documented in both MDM as applicable and the Disposition within this note     Time User Action Codes Description Comment    4/11/2019  5:54 PM Michelle Miramontes Add [M25 562] Left knee pain     4/11/2019  8:31 PM Michelle Miramontes Add [R53 1] Left-sided weakness     4/11/2019  9:58 PM Susana Mercado Add [R23 8] Multiple wounds of skin     4/11/2019 10:00 PM Susana Mercado Modify [S60 237] Left knee pain     4/11/2019 10:00 PM Susana Mercado Add [I63 9] Ischemic stroke ([facility] [address] ) ED Disposition     ED Disposition Condition Date/Time Comment    Admit Good Thu Apr 11, 2019  8:32 PM Case was discussed with SOD and the patient's admission status was agreed to be Admission Status: observation status to the service of Dr Danie Reza   Follow-up Information     Follow up With Specialties Details Why Contact Info Additional [address] Emergency Department Emergency Medicine Go to  If symptoms worsen [address]  [phone]  ED, [address] [place] [address]  Call in 1 day To make an appointment [phone]             Current Discharge Medication List      CONTINUE these medications which have NOT CHANGED    Details   amLODIPine (NORVASC) 5 mg tablet Take 1 tablet by mouth daily  Qty: 30 tablet, Refills: 0           No discharge procedures on file  ED Provider  Attending physically available and evaluated Uriel Oro I managed the patient along with the ED Attending      Electronically Signed by         Jb Sanford MD  04/13/19 9675

## 2019-04-12 ENCOUNTER — APPOINTMENT (OUTPATIENT)
Dept: RADIOLOGY | Facility: HOSPITAL | Age: 69
DRG: 065 | End: 2019-04-12
Payer: MEDICARE

## 2019-04-12 ENCOUNTER — APPOINTMENT (OUTPATIENT)
Dept: NON INVASIVE DIAGNOSTICS | Facility: HOSPITAL | Age: 69
DRG: 065 | End: 2019-04-12
Payer: MEDICARE

## 2019-04-12 LAB
ANION GAP SERPL CALCULATED.3IONS-SCNC: 7 MMOL/L (ref 4–13)
ATRIAL RATE: 111 BPM
BUN SERPL-MCNC: 11 MG/DL (ref 5–25)
CALCIUM SERPL-MCNC: 8.4 MG/DL (ref 8.3–10.1)
CHLORIDE SERPL-SCNC: 110 MMOL/L (ref 100–108)
CHOLEST SERPL-MCNC: 182 MG/DL (ref 50–200)
CO2 SERPL-SCNC: 25 MMOL/L (ref 21–32)
CREAT SERPL-MCNC: 0.98 MG/DL (ref 0.6–1.3)
ERYTHROCYTE [DISTWIDTH] IN BLOOD BY AUTOMATED COUNT: 14.4 % (ref 11.6–15.1)
EST. AVERAGE GLUCOSE BLD GHB EST-MCNC: 131 MG/DL
FOLATE SERPL-MCNC: >20 NG/ML (ref 3.1–17.5)
GFR SERPL CREATININE-BSD FRML MDRD: 78 ML/MIN/1.73SQ M
GLUCOSE P FAST SERPL-MCNC: 93 MG/DL (ref 65–99)
GLUCOSE SERPL-MCNC: 93 MG/DL (ref 65–140)
HBA1C MFR BLD: 6.2 % (ref 4.2–6.3)
HCT VFR BLD AUTO: 42.8 % (ref 36.5–49.3)
HDLC SERPL-MCNC: 52 MG/DL (ref 40–60)
HGB BLD-MCNC: 13.8 G/DL (ref 12–17)
LDLC SERPL CALC-MCNC: 118 MG/DL (ref 0–100)
MCH RBC QN AUTO: 29.5 PG (ref 26.8–34.3)
MCHC RBC AUTO-ENTMCNC: 32.2 G/DL (ref 31.4–37.4)
MCV RBC AUTO: 92 FL (ref 82–98)
P AXIS: 56 DEGREES
PLATELET # BLD AUTO: 307 THOUSANDS/UL (ref 149–390)
PMV BLD AUTO: 10.4 FL (ref 8.9–12.7)
POTASSIUM SERPL-SCNC: 3.7 MMOL/L (ref 3.5–5.3)
PR INTERVAL: 176 MS
QRS AXIS: -2 DEGREES
QRSD INTERVAL: 80 MS
QT INTERVAL: 322 MS
QTC INTERVAL: 437 MS
RBC # BLD AUTO: 4.68 MILLION/UL (ref 3.88–5.62)
SODIUM SERPL-SCNC: 142 MMOL/L (ref 136–145)
T WAVE AXIS: 44 DEGREES
TRIGL SERPL-MCNC: 62 MG/DL
TSH SERPL DL<=0.05 MIU/L-ACNC: 2.63 UIU/ML (ref 0.36–3.74)
VENTRICULAR RATE: 111 BPM
VIT B12 SERPL-MCNC: 243 PG/ML (ref 100–900)
WBC # BLD AUTO: 9.26 THOUSAND/UL (ref 4.31–10.16)

## 2019-04-12 PROCEDURE — 80061 LIPID PANEL: CPT | Performed by: INTERNAL MEDICINE

## 2019-04-12 PROCEDURE — 82607 VITAMIN B-12: CPT | Performed by: INTERNAL MEDICINE

## 2019-04-12 PROCEDURE — 99255 IP/OBS CONSLTJ NEW/EST HI 80: CPT | Performed by: PHYSICAL MEDICINE & REHABILITATION

## 2019-04-12 PROCEDURE — 85027 COMPLETE CBC AUTOMATED: CPT | Performed by: INTERNAL MEDICINE

## 2019-04-12 PROCEDURE — 93880 EXTRACRANIAL BILAT STUDY: CPT | Performed by: SURGERY

## 2019-04-12 PROCEDURE — 93010 ELECTROCARDIOGRAM REPORT: CPT | Performed by: INTERNAL MEDICINE

## 2019-04-12 PROCEDURE — 70553 MRI BRAIN STEM W/O & W/DYE: CPT

## 2019-04-12 PROCEDURE — 0HBRXZZ EXCISION OF TOE NAIL, EXTERNAL APPROACH: ICD-10-PCS | Performed by: INTERNAL MEDICINE

## 2019-04-12 PROCEDURE — 83036 HEMOGLOBIN GLYCOSYLATED A1C: CPT | Performed by: INTERNAL MEDICINE

## 2019-04-12 PROCEDURE — 80048 BASIC METABOLIC PNL TOTAL CA: CPT | Performed by: INTERNAL MEDICINE

## 2019-04-12 PROCEDURE — A9585 GADOBUTROL INJECTION: HCPCS | Performed by: INTERNAL MEDICINE

## 2019-04-12 PROCEDURE — 36415 COLL VENOUS BLD VENIPUNCTURE: CPT | Performed by: INTERNAL MEDICINE

## 2019-04-12 PROCEDURE — 99253 IP/OBS CNSLTJ NEW/EST LOW 45: CPT | Performed by: PHYSICIAN ASSISTANT

## 2019-04-12 PROCEDURE — 93880 EXTRACRANIAL BILAT STUDY: CPT

## 2019-04-12 PROCEDURE — 99253 IP/OBS CNSLTJ NEW/EST LOW 45: CPT | Performed by: INTERNAL MEDICINE

## 2019-04-12 PROCEDURE — 82746 ASSAY OF FOLIC ACID SERUM: CPT | Performed by: INTERNAL MEDICINE

## 2019-04-12 RX ORDER — CLOPIDOGREL BISULFATE 75 MG/1
75 TABLET ORAL DAILY
Status: DISCONTINUED | OUTPATIENT
Start: 2019-04-13 | End: 2019-04-15 | Stop reason: HOSPADM

## 2019-04-12 RX ORDER — CLOPIDOGREL BISULFATE 75 MG/1
600 TABLET ORAL ONCE
Status: COMPLETED | OUTPATIENT
Start: 2019-04-12 | End: 2019-04-12

## 2019-04-12 RX ADMIN — ASPIRIN 81 MG 81 MG: 81 TABLET ORAL at 08:05

## 2019-04-12 RX ADMIN — HEPARIN SODIUM 5000 UNITS: 5000 INJECTION INTRAVENOUS; SUBCUTANEOUS at 15:14

## 2019-04-12 RX ADMIN — HEPARIN SODIUM 5000 UNITS: 5000 INJECTION INTRAVENOUS; SUBCUTANEOUS at 06:33

## 2019-04-12 RX ADMIN — HEPARIN SODIUM 5000 UNITS: 5000 INJECTION INTRAVENOUS; SUBCUTANEOUS at 21:37

## 2019-04-12 RX ADMIN — GADOBUTROL 10 ML: 604.72 INJECTION INTRAVENOUS at 06:07

## 2019-04-12 RX ADMIN — ATORVASTATIN CALCIUM 80 MG: 80 TABLET, FILM COATED ORAL at 17:54

## 2019-04-12 RX ADMIN — CLOPIDOGREL BISULFATE 600 MG: 75 TABLET ORAL at 12:37

## 2019-04-12 NOTE — PROGRESS NOTES
[facility] Senior Admission Note   Unit/Bed # @DBLINK (NORBERTQ,35700)@ Encounter: [id number]  SOD Team A          Yadira Biswas 70 y o  male [id number]       Patient seen and examined  Reviewed H&P per Dr Israel    Agree with the assessment and plan     Assessment/Plan: Principal Problem:    Left-sided weakness  Active Problems:    Ambulatory dysfunction    Falls frequently    GRIFFIN (acute kidney injury) ([facility] [address] )    Prediabetes    HTN (hypertension)    Mild cognitive impairment    Morbid obesity with BMI of 45 0-49 9, adult (HCC)    Knee pain    Multiple wounds of skin         Disposition:  OBSERVATION    Expected LOS: <2 9 Altagracia Quach DO

## 2019-04-12 NOTE — SPEECH THERAPY NOTE
Speech Language/Pathology    Speech/Language Pathology Progress Note    Patient Name: Roland Chan  [id number] Date: 4/12/2019     Consult received and chart reviewed  Spoke c RN  Pt passed RN dysphagia screen and has been tolerating meals but continues c some garbled speech  Will follow up tomorrow and formally assess speech as needed

## 2019-04-12 NOTE — CONSULTS
Consultation - Neurology   Rao Mcnally 70 y o  male MRN: [id number]  Unit/Bed#: ED 25 Encounter: [id number]      Assessment/Plan    Assessment:  1  R thalamus/basal ganglia infarcts     Plan:  1  Stroke pathway intiated  2  Continue ASA 81mg  3  Will load patient with Plavix 600mg now, and then will start patient on Plavix 75mg tomorrow  4  Patient will be on DAPT for 30 days, and then will stop Plavix 75mg and continue ASA 81mg  4  Continue Lipitor 80mg  5  Echocardiogram pending  6  Telemetry  7  Secondary risk factor modification  8  Goal of normotension and euglycemia  9  PT/OT/ST  10  Frequent neuro checks  11  STAT CT head with acute worsening in neuro exam/mental status  12  Notify Neurology with any changes in exam  13  Patient will need outpatient follow-up with stroke clinic  Appointment requested  Results:  1  CTA head and neck: No evidence of acute intracranial hemorrhage  No evidence of hemodynamic significant stenosis, aneurysm or dissection  Old lacunar infarcts  2  MRI brain: Acute R thalamus/basal ganglia infarcts  3  VAS carotid: <50% stenosis in the R ICA and L ICA  4  TSH: 2 630  5  Lipid panel: LDL of 118  6  HgbA1c: 6 2    History of Present Illness     Reason for Consult / Principal Problem: Ischemic stoke  Hx and PE limited by: None  HPI: Rao Mcnally is a 70 y o  right handed male with a PMH of mild cognitive impairment, chronic L knee pain s/p L knee surgery with no hardware, and HTN who presents to [facility] ED on 4/11 initially for L knee pain  Patient walked across the street to avoid a car and developed L knee pain and felt weak and slumped over a telephone pole  EMS was called and he was brought to the ED  Patient was initially worked up in the ED for knee pain  When patient was re-evaluated in the ED, he was unable to sit or stand and had L sided weakness   CTA head and neck was completed at the time, which indicated old lacunar infarcts but no hemorrhage, dissection, significant stenosis, or aneurysm  Patient was not given tPA due to unknown last known normal  Patient blood pressures were between 487-086 systolic  Patient underwent an MRI brain on 4/12 which showed acute R thalamus/basal ganglia infarcts  Neurology was consulted for acute ischemic stroke  Patient was initiated on the stroke pathway  Today, patient states that he is still feeling weak on the L side  He states that this never happened to him before  He states that he does not have a family history of stroke  He has not been to a doctor since 2017 and he does not take any medications  Patient reports using a cane to ambulate for two years  Patient denies smoking or any illicit drug use  Patient denies changes in vision, difficulty swallowing, word finding difficulty, or numbness  Patient had an NIH stroke scale of 5 today for mild L nasolabial fold flattening, L arm drift, L leg drift, and mild dysarthria  Inpatient consult to Neurology  Consult performed by: Cary Mcdermott PA-C  Consult ordered by: Justin Villegas MD        Review of Systems   Constitutional: Positive for fatigue  Negative for appetite change  HENT: Negative for trouble swallowing  Eyes: Negative for visual disturbance  Respiratory: Negative for chest tightness and shortness of breath  Cardiovascular: Negative for chest pain  Musculoskeletal: Positive for arthralgias  L knee pain   Neurological: Positive for facial asymmetry, speech difficulty and weakness  Negative for dizziness, numbness and headaches  L nasolabial fold flattening  Psychiatric/Behavioral: Negative for confusion  Historical Information   Past Medical History:   Diagnosis Date    Hypertension     MCI (mild cognitive impairment)      History reviewed  No pertinent surgical history    Social History   Social History     Substance and Sexual Activity   Alcohol Use No     Social History     Substance and Sexual Activity   Drug Use No     Social History     Tobacco Use   Smoking Status Never Smoker   Smokeless Tobacco Never Used     Family History: History reviewed  No pertinent family history  Review of previous medical records was completed  Meds/Allergies   current meds:   Current Facility-Administered Medications   Medication Dose Route Frequency    acetaminophen (TYLENOL) tablet 650 mg  650 mg Oral Q4H PRN    aspirin chewable tablet 81 mg  81 mg Oral Daily    atorvastatin (LIPITOR) tablet 80 mg  80 mg Oral Daily With Dinner    heparin (porcine) subcutaneous injection 5,000 Units  5,000 Units Subcutaneous Q8H [facility]    and [facility] meds:   Prior to Admission Medications   Prescriptions Last Dose Informant Patient Reported? Taking? amLODIPine (NORVASC) 5 mg tablet   No No   Sig: Take 1 tablet by mouth daily      Facility-Administered Medications: None     No Known Allergies    Objective   Vitals:Blood pressure 167/80, pulse 94, temperature 99 1 °F (37 3 °C), temperature source Oral, resp  rate 16, SpO2 94 %  ,There is no height or weight on file to calculate BMI  No intake or output data in the 24 hours ending 04/12/19 1036    Invasive Devices: Invasive Devices     Peripheral Intravenous Line            Peripheral IV 04/11/19 Right Antecubital less than 1 day              Physical exam:  Constitutional: Patient is resting comfortably in bed, in no acute distress  No diaphoresis  HENT: Normocephalic, atraumatic  Right and left external ear normal  Oropharynx clear and moist  Nose normal     Eyes: PERRL  EOMs intact without nystagmus  Neck: Supple  Cardiovascular: Regular rate and rhythm  Pulmonary: No respiratory distress  Effort normal  Normal breath sounds bilaterally  Musculoskeletal: Decreased ROM on L UE and LE  Normal ROM for R upper and lower extremities  Neurological: A&Ox3  Follows all commands  Skin: Warm and dry  Psychiatric: Pleasant and conversational  Normal mood and affect       Neurologic Exam     Mental Status   Oriented to person, place, and time  Speech: (Mild dysarthria )  Patient is alert and oriented to person, place, and date  Able to name simple objects  Able to follow one and two step commands  Able to do simple calculations  Able to name the president  Cranial Nerves     CN II   Visual fields full to confrontation  CN III, IV, VI   Pupils are equal, round, and reactive to light  Extraocular motions are normal    Nystagmus: none   Conjugate gaze: present    CN V   Facial sensation intact  CN VII   Right facial weakness: none  Left facial weakness: L nasolabial fold flattening observed  CN VIII   Hearing: intact    CN IX, X   Palate: symmetric    CN XI   CN XI normal      CN XII   CN XII normal      Motor Exam   Muscle bulk: normal  Overall muscle tone: normal  Right arm pronator drift: absent  Left arm pronator drift: present    Strength   Strength 5/5 except as noted  L biceps and triceps: 4/5  L deltoid: 3+/5  L quadriceps: 4+/5  L dorsiflexion: 4+/5  L plantar flexion: 5/5     Sensory Exam   Light touch normal      Temperature sensation tested and intact in all four extremities  Gait, Coordination, and Reflexes     Gait  Gait: (Deferred )    Coordination   Finger to nose coordination: normal    Reflexes   Right brachioradialis: 3+  Left brachioradialis: 3+  Right biceps: 3+  Left biceps: 3+  Right patellar: 3+  Left patellar: 3+  Right plantar: normal  Left plantar: upgoing    Lab Results: I have personally reviewed pertinent reports  Imaging Studies: I have personally reviewed pertinent reports  and I have personally reviewed pertinent films in PACS  EKG, Pathology, and Other Studies: I have personally reviewed pertinent reports      VTE Prophylaxis: Sequential compression device (Venodyne)  and Heparin    Code Status: Level 1 - Full Code  Advance Directive and Living Will:      Power of :    POLST:      Counseling / Coordination of Care  Total time spent today 50 minutes  Greater than 50% of total time was spent with the patient and / or family counseling and / or coordination of care  A description of the counseling / coordination of care: Discussed plan of care with patient and Dr Green Spore

## 2019-04-12 NOTE — PLAN OF CARE
Problem: Potential for Falls  Goal: Patient will remain free of falls  Description  INTERVENTIONS:  - Assess patient frequently for physical needs  -  Identify cognitive and physical deficits and behaviors that affect risk of falls  -  Clements fall precautions as indicated by assessment   - Educate patient/family on patient safety including physical limitations  - Instruct patient to call for assistance with activity based on assessment  - Modify environment to reduce risk of injury  - Consider OT/PT consult to assist with strengthening/mobility  Outcome: Progressing     Problem: Neurological Deficit  Goal: Neurological status is stable or improving  Description  Interventions:  - Monitor and assess patient's level of consciousness, motor function, sensory function, and level of assistance needed for ADLs  - Monitor and report changes from baseline  Collaborate with interdisciplinary team to initiate plan and implement interventions as ordered  - Provide and maintain a safe environment  - Utilize seizure precautions  - Utilize fall precautions  - Utilize aspiration precautions  - Utilize bleeding precautions  Outcome: Progressing     Problem: Activity Intolerance/Impaired Mobility  Goal: Mobility/activity is maintained at optimum level for patient  Description  Interventions:  - Assess and monitor patient  barriers to mobility and need for assistive/adaptive devices  - Assess patient's emotional response to limitations  - Collaborate with interdisciplinary team and initiate plans and interventions as ordered  - Encourage independent activity per ability   - Maintain proper body alignment  - Perform active/passive rom as tolerated/ordered    - Plan activities to conserve energy   - Turn patient  Outcome: Progressing

## 2019-04-12 NOTE — CONSULTS
PHYSICAL MEDICINE AND REHABILITATION CONSULT NOTE  Dario De Paz 70 y o  male MRN: [id number]  Unit/Bed#: ED 25 Encounter: [id number]    Requested by (Physician/Service): No att  providers found  Reason for Consultation:  Assessment of rehabilitation needs  Chief Complaint:  "I'm weak, and can't eat "      Assessment and Recommendations:  Dario De Paz is a 70 y o  male with PMH of MCI, L Chronic knee pain and HTN with R thalamic/basal ganglia infarcts  PM&R consulted for rehabilitation recommendations  R thalamic/Basal Ganglia Infarcts   - Affecting speech   - RUE weakness/coordination   - ON DAPT for 30 days followed by just ASA   - Statin   - Echo pending    HTN  L Chronic knee pain   - Using an ace wrap for support   - Tylenol PRN    - Uses SPC at baseline  MCI  Prediabetes      Impairments:  Impaired functional mobility and ability to perform ADL's  Impaired cognition and speech    Recommendations:  - Continue PT/OT/SLP while inpatient  - Pt is unable to safely return home until he is at the modified-independent functional level (0% assistance with a device)  - States he does not have anyone who can provide him with assistance, but at this time based on his exam this early on, I would anticipate that he could potentially get to a modified-independent level with acute inpatient rehab  - Will await PT/OT eval, but given stroke he has appropriate medical need for monitoring of neurologic function, management of L knee pain to maximize participation in therapies, management and monitoring of HTN  He would be a good candidate for acute inpatient rehab     - HOWEVER, would have OT do an ACLS on this patient to ensure he is safe to go home without supervision given his baseline MCI  If he requires supervision, I would recommend a sub-acute rehab to allow time to set-up a good dispo plan for the patient  Thank you for allowing the PM&R service to participate in the care of this patient   We will continue to follow Cristal Rucker's progress with you  Please do not hesitate to call with questions or concerns    History of Present Illness:  Rao Mcnally is a 70 y o  male with PMH of MCI, chronic L knee pain, and HTN who does not follow with physicians with L sided weakness  CTA H/N showed no acute hemorrhage or LVO, but showed old lacunar infarcts  SBPs 160-170s  CBC with mild leukocytosis  He did not receive tPA due to unknown last known normal  MRI Brain on 4/12 revealed an acute R thalamic/basal ganglia infarcts  Neuro is following the patient and was started on stroke pathway  NIHSS 5  Carotid <50% stenosis bilaterally  Hgb A1C 6 2  He was loaded with Plavix, and will be on DAPT for 30 days, after which Plavix will stop  An echo is pending  Restrictions include:  none    Last Weight:    Wt Readings from Last 1 Encounters:   12/06/17 95 6 kg (210 lb 12 2 oz)     Last BMI:  Estimated body mass index is 38 55 kg/m² as calculated from the following:    Height as of 4/12/19: 5' 2" (1 575 m)  Weight as of 12/7/17: 95 6 kg (210 lb 12 2 oz)  Functional History:     Prior to Admission:     Functional Status: Patient was independent with mobility/ambulation, transfers, ADL's, IADL's  Uses a SPC for ambulation in his R hand  Present:  Physical Therapy: pending    Occupational Therapy: pending    Speech Therapy: pending      Past Medical History:    Past Medical History:   Diagnosis Date    Hypertension     MCI (mild cognitive impairment)         Past Surgical History:    History reviewed  No pertinent surgical history       Medications:    Current Facility-Administered Medications:     acetaminophen (TYLENOL) tablet 650 mg, 650 mg, Oral, Q4H PRN, Jreemy Gould MD    aspirin chewable tablet 81 mg, 81 mg, Oral, Daily, Shana Downs, DO, 81 mg at 04/12/19 0805    atorvastatin (LIPITOR) tablet 80 mg, 80 mg, Oral, Daily With Chantel Izaguirre MD    heparin (porcine) subcutaneous injection 5,000 Units, 5,000 Units, Subcutaneous, Q8H [address], 5,000 Units at 04/12/19 2559 **AND** [CANCELED] Platelet count, , , Once, Nisreen Lacey MD    Current Outpatient Medications:     amLODIPine (NORVASC) 5 mg tablet, Take 1 tablet by mouth daily, Disp: 30 tablet, Rfl: 0    Allergies:   No Known Allergies     Family History:    History reviewed  No pertinent family history  Social History:    Social History     Socioeconomic History    Marital status: Single     Spouse name: None    Number of children: None    Years of education: None    Highest education level: None   Occupational History    None   Social Needs    Financial resource strain: None    Food insecurity:     Worry: None     Inability: None    Transportation needs:     Medical: None     Non-medical: None   Tobacco Use    Smoking status: Never Smoker    Smokeless tobacco: Never Used   Substance and Sexual Activity    Alcohol use: No    Drug use: No    Sexual activity: None   Lifestyle    Physical activity:     Days per week: None     Minutes per session: None    Stress: None   Relationships    Social connections:     Talks on phone: None     Gets together: None     Attends [employer] service: None     Active member of club or organization: None     Attends meetings of clubs or organizations: None     Relationship status: None    Intimate partner violence:     Fear of current or ex partner: None     Emotionally abused: None     Physically abused: None     Forced sexual activity: None   Other Topics Concern    None   Social History Narrative    None        Samanta Esquivel is not  and Lives with: lives alone  He lives in [facility]  The living area: can live on one level  Equipment in home: [facility]  He reports 20+ stairs to enter the home  Patient/family's goals: Return to previous home/apartment    The patient will not have 24 hour ARC Supervision/physical assistance: supervision/physical assistance available upon discharge  Review of Systems: A 10 point review of systems was negative except for what is noted in the HPI  Physical Exam:  /85   Pulse 88   Temp 99 1 °F (37 3 °C) (Oral)   Resp (!) 23   SpO2 94%      No intake or output data in the 24 hours ending 04/12/19 1113    There is no height or weight on file to calculate BMI  Gen: No acute distress, Well-nourished, well-appearing  HEENT: Moist mucus membranes, Normocephalic/Atraumatic  Cardiovascular: Regular rate, rhythm, S1/S2  Distal pulses palpable  Heme/Extr: No edema/clubbing/cyanosis  Pulmonary: Non-labored breathing  Lungs CTAB  : No jackson   GI: Soft, non-tender, non-distended  BS+  MSK: Passive ROM is WFL in all extremities  Has ace wrap around L knee  No efrain effusions noted  See below for MMT scores  Integumentary: Skin is warm, dry  L forearm skin tears and ecchymoses  Neuro: AAOx3, CN 2-12: tongue deviation to the left  Sensation intact to light touch throughout  Speech is nasal and dysarthric  Appropriate to questioning  Follows simple commands, two step commands Tone is decreased in RUE  Reflexes are brisk throughout  L plantar response is extensor  Parveen's present on L  FTN normal on the right  MMT:   Strength:   Right  Left  Site  Right  Left  Site    5 3  S Ab: Shoulder Abductors  5  4-  HF: Hip Flexors    5 3  EF: Elbow Flexors  -  - KF: Knee Flexors    5  2+  EE: Elbow Extensors  5  4+  KE: Knee Extensors    5  3  WE: Wrist Extensors  5  5  DR: Dorsi Flexors    4  3  FF: Finger Flexors  5  5  PF: Plantar Flexors    4  2  HI: Hand Intrinsics  5  5  EHL: Extensor Hallucis Longus   Psych: Normal mood and affect         Laboratory:    Lab Results   Component Value Date    HGB 13 8 04/12/2019    HCT 42 8 04/12/2019    WBC 9 26 04/12/2019      Lab Results   Component Value Date    BUN 11 04/12/2019    K 3 7 04/12/2019     (H) 04/12/2019    CREATININE 0 98 04/12/2019      Lab Results   Component Value Date    PROTIME 14 3 (H) 04/11/2019    INR 1 10 04/11/2019       Imaging: Reviewed  Cta Head And Neck With And Without Contrast    Result Date: 4/11/2019  Impression: No evidence of acute intracranial hemorrhage  No evidence of hemodynamic significant stenosis, aneurysm or dissection  Old lacunar infarcts  Nonspecific 1 3 cm right paratracheal lymph node  Workstation performed: [id number]     Xr Chest 2 Views    Result Date: 4/12/2019  Impression: No acute cardiopulmonary disease  Workstation performed: [id number]     Xr Knee 4+ Vw Left Injury    Result Date: 4/11/2019  Impression: No acute osseous abnormality   Workstation performed: [id number]     Mri Brain W Wo Contrast    Result Date: 4/12/2019  Impression: Acute right thalamus/basal ganglia infarcts I personally discussed this study  with Dr Benny Mchugh 4/12/2019 at 8:11 AM  Workstation performed: [id number]

## 2019-04-12 NOTE — PROGRESS NOTES
INTERNAL MEDICINE HISTORY AND PHYSICAL  ED 10 SOD Team A    NAME: Yanet Barrios  AGE: 70 y o  SEX: male  : 1950   MRN: [id number]  ENCOUNTER: [id number]    DATE: 2019  TIME: 11:31 PM    Primary Care Physician: No primary care provider on file  Admitting Provider: Jake Birmingham MD    Chief complaint:  Left-sided weakness    History of Present Illness     Yanet Barrios is a 70 y o  male medical history of mild cognitive impairment chronic left knee pain and hypertension  Patient does not follow with a doctor and takes no meds  Patient does report a history of left knee surgery, states no hardware was placed at that time  Earlier this afternoon patient was attempted to cross the street  Patient is not able to describe clearly what happened, but it sounds like he had to rash across the street to avoid a car  Once again across the street he started feeling weak and needed to lean up against a telephone pole  Patient then began to slump over  Patient was noted by bystanders to be slumping against telephone pole  He denies falls  Denies hitting his head  Denies that this episode anything to do with his knee pain which he states is at baseline  An ambulance was called and patient was brought to the ED  Patient was initially worked up for left-sided knee pain  Stated at that time that he was able to ambulate  X-ray of left knee negative for any acute abnormality  Patient was subsequently re-examined and was found to have difficulty sitting up or standing  With left-sided weakness  He was unsure how long going on at that time or whether left-sided weakness is new or old  CTA head and neck obtained and without acute hemorrhage  It did reveal old lacunar infarcts  The rest of patient's workup significant for hypertension with systolic blood pressures in the 160s 170s  CBC with mild leukocytosis 13 4  CMP unremarkable  Troponin negative and EKG unremarkable    PT INR essentially normal     Patient states that he lives by himself and walks with a cane at baseline  He is able to perform all ADLs  He has a friend that comes over once a month and helps him with IADLs  He does not currently have a job, but use to wash dishes at [employer]  Review of Systems   Review of Systems   Constitutional: Positive for fatigue  Negative for activity change, appetite change, chills and fever  Respiratory: Negative for apnea, cough, choking, chest tightness, shortness of breath, wheezing and stridor  Cardiovascular: Negative for chest pain, palpitations and leg swelling  Gastrointestinal: Negative for abdominal distention, abdominal pain, anal bleeding, blood in stool, constipation, diarrhea, nausea, rectal pain and vomiting  Past Medical History     Past Medical History:   Diagnosis Date    Hypertension     MCI (mild cognitive impairment)        Past Surgical History   History reviewed  No pertinent surgical history  Social History     Social History     Substance and Sexual Activity   Alcohol Use No     Social History     Substance and Sexual Activity   Drug Use No     Social History     Tobacco Use   Smoking Status Never Smoker   Smokeless Tobacco Never Used       Family History   History reviewed  No pertinent family history  Medications Prior to Admission     Prior to Admission medications    Medication Sig Start Date End Date Taking?  Authorizing Provider   amLODIPine (NORVASC) 5 mg tablet Take 1 tablet by mouth daily 12/13/17   Edil Reece MD   docusate sodium (COLACE) 100 mg capsule Take 1 capsule by mouth 2 (two) times a day for 30 days 12/12/17 4/11/19  Edil Reece MD       Allergies   No Known Allergies    Objective     Vitals:    04/11/19 1840 04/11/19 1945 04/11/19 2054 04/11/19 2155   BP: (!) 177/88 167/89 126/99 162/96   BP Location: Right arm Right arm Right arm Right arm   Pulse: (!) 106 98 99 90   Resp: 19 19 19 17   Temp:       TempSrc:       SpO2: 94% 94% 94% 96%     There is no height or weight on file to calculate BMI  No intake or output data in the 24 hours ending 04/11/19 2331  Invasive Devices     Peripheral Intravenous Line            Peripheral IV 04/11/19 Right Antecubital less than 1 day                Physical Exam    Physical Exam   Constitutional: He is oriented to person, place, and time  He appears well-developed and well-nourished  No distress  HENT:   Head: Normocephalic and atraumatic  Eyes: Pupils are equal, round, and reactive to light  Conjunctivae are normal  Right eye exhibits no discharge  Left eye exhibits no discharge  No scleral icterus  Neck: Normal range of motion  Neck supple  No JVD present  No tracheal deviation present  No thyromegaly present  Cardiovascular: Normal rate, regular rhythm, normal heart sounds and intact distal pulses  Exam reveals no gallop and no friction rub  No murmur heard  Pulmonary/Chest: Effort normal and breath sounds normal  No stridor  No respiratory distress  He has no wheezes  He has no rales  He exhibits no tenderness  Abdominal: Soft  Bowel sounds are normal  He exhibits no distension and no mass  There is no tenderness  There is no rebound and no guarding  No hernia  Musculoskeletal: Normal range of motion  He exhibits no edema, tenderness or deformity  Lymphadenopathy:     He has no cervical adenopathy  Neurological: He is alert and oriented to person, place, and time  CN: No visual field deficit  Full range of motion during EOM though patient notes diplopia during exam, with noted eye crossing at that time  With left sided facial droop noted  Pupillary reflex intact  Hearing intactt  Facial sensation intact  Upper CN 7 intact  With left shoulder shrug weakness  CN 10 intact  CN 12 intact  Motor:   LUE 2/5; LLE 4+/5 though likely secondary to pain in knee  R side 5/5  Sensation : intact throughout    Reflexes: somewhat brisk  Finger to nose and heel to shin intact  Down going babinski  Skin: Skin is warm and dry  No rash noted  He is not diaphoretic  No erythema  No pallor  Vitals reviewed  Lab Results: I have personally reviewed pertinent reports  CBC:   Results from last 7 days   Lab Units 04/11/19  1837   WBC Thousand/uL 13 35*   RBC Million/uL 4 78   HEMOGLOBIN g/dL 14 0   HEMATOCRIT % 43 7   MCV fL 91   MCH pg 29 3   MCHC g/dL 32 0   RDW % 14 3   MPV fL 10 7   PLATELETS Thousands/uL 314   NRBC AUTO /100 WBCs 0   NEUTROS PCT % 84*   LYMPHS PCT % 9*   MONOS PCT % 7   EOS PCT % 0   BASOS PCT % 0   NEUTROS ABS Thousands/µL 11 14*   LYMPHS ABS Thousands/µL 1 16   MONOS ABS Thousand/µL 0 95   EOS ABS Thousand/µL 0 00   , Chemistry Profile:   Results from last 7 days   Lab Units 04/11/19  1837   POTASSIUM mmol/L 3 6   CHLORIDE mmol/L 109*   CO2 mmol/L 23   BUN mg/dL 10   CREATININE mg/dL 1 18   CALCIUM mg/dL 9 0   AST U/L 14   ALT U/L 16   ALK PHOS U/L 113   EGFR ml/min/1 73sq m 63       Imaging: I have personally reviewed pertinent films in PACS  Cta Head And Neck With And Without Contrast    Result Date: 4/11/2019  Narrative: CTA NECK AND BRAIN WITH AND WITHOUT CONTRAST INDICATION: left sided weakness COMPARISON:   None  TECHNIQUE:  Routine CT imaging of the Brain without contrast   Post contrast imaging was performed after administration of iodinated contrast through the neck and brain  Post contrast axial 0 625 mm images timed to opacify the arterial system  3D rendering was performed on an independent workstation  MIP reconstructions performed  Coronal reconstructions were performed of the noncontrast portion of the brain  Radiation dose length product (DLP) for this visit:  1512 mGy-cm   This examination, like all CT scans performed in the [facility], was performed utilizing techniques to minimize radiation dose exposure, including the use of iterative reconstruction and automated exposure control     IV Contrast:  85 mL of iohexol (OMNIPAQUE)  IMAGE QUALITY:   Diagnostic FINDINGS: NONCONTRAST BRAIN PARENCHYMA: Decreased attenuation is noted in periventricular and subcortical white matter demonstrating an appearance that is statistically most likely to represent moderate microangiopathic change; this appearance is similar when compared to most recent prior examination  Old infarcts identified in the bilateral thalami, right basal ganglia and left putamen  No intracranial mass, mass effect or midline shift  No acute parenchymal hemorrhage  VENTRICLES AND EXTRA-AXIAL SPACES:  Normal for the patient's age  VISUALIZED ORBITS AND PARANASAL SINUSES:  Unremarkable  CERVICAL VASCULATURE AORTIC ARCH AND GREAT VESSELS:  Normal aortic arch and great vessel origins  Normal visualized subclavian vessels  RIGHT VERTEBRAL ARTERY CERVICAL SEGMENT:  Mild stenosis at the origin  The vessel is normal in caliber throughout the neck  LEFT VERTEBRAL ARTERY CERVICAL SEGMENT:  Normal origin  The vessel is normal in caliber throughout the neck  RIGHT EXTRACRANIAL CAROTID SEGMENT:  Mild atherosclerotic disease of the distal common carotid artery and proximal cervical internal carotid artery without significant stenosis compared to the more distal ICA  LEFT EXTRACRANIAL CAROTID SEGMENT:  Mild atherosclerotic disease of the distal common carotid artery and proximal cervical internal carotid artery without significant stenosis compared to the more distal ICA  NASCET criteria was used to determine the degree of internal carotid artery diameter stenosis  INTRACRANIAL VASCULATURE INTERNAL CAROTID ARTERIES:  Moderate carotid siphon atherosclerotic plaque  ANTERIOR CIRCULATION:  Symmetric A1 segments and anterior cerebral arteries with normal enhancement  Normal anterior communicating artery  MIDDLE CEREBRAL ARTERY CIRCULATION:  M1 segment and middle cerebral artery branches demonstrate normal enhancement bilaterally   DISTAL VERTEBRAL ARTERIES:  Normal distal vertebral arteries  Posterior inferior cerebellar artery origins are normal  Normal vertebral basilar junction  BASILAR ARTERY:  Basilar artery is normal in caliber  Normal superior cerebellar arteries  POSTERIOR CEREBRAL ARTERIES: Both posterior cerebral arteries arises from the basilar tip  Both arteries demonstrate normal enhancement  Normal posterior communicating arteries  DURAL VENOUS SINUSES:  Normal  NON VASCULAR ANATOMY BONY STRUCTURES:  No acute osseous abnormality  SOFT TISSUES OF THE NECK:  Normal  THORACIC INLET:  Right paratracheal 1 3 cm lymph node  Impression: No evidence of acute intracranial hemorrhage  No evidence of hemodynamic significant stenosis, aneurysm or dissection  Old lacunar infarcts  Nonspecific 1 3 cm right paratracheal lymph node  Workstation performed: [id number]     Xr Knee 4+ Vw Left Injury    Result Date: 4/11/2019  Narrative: LEFT KNEE INDICATION:   left knee injury  COMPARISON:  12/6/2017 VIEWS:  XR KNEE 4+ VW LEFT INJURY FINDINGS: There is no acute fracture or dislocation  There is no joint effusion  No significant degenerative changes  No lytic or blastic lesions are seen  Soft tissues are unremarkable  Impression: No acute osseous abnormality  Workstation performed: [id number]       Microbiology: cultures obtained in emergency department include     Urinalysis:       Invalid input(s): URIBILINOGEN     Urine Micro:        EKG, Pathology, and Other Studies: I have personally reviewed pertinent reports        Medications given in Emergency Department     Medication Administration - last 24 hours from 04/10/2019 2331 to 04/11/2019 2331       Date/Time Order Dose Route Action Action by     04/11/2019 1712 acetaminophen (TYLENOL) tablet 975 mg 975 mg Oral Given Gaby Adam RN     04/11/2019 1934 iohexol (OMNIPAQUE) 350 MG/ML injection (MULTI-DOSE) 85 mL 85 mL Intravenous Given Eric Grissom     04/11/2019 2244 aspirin tablet 325 mg 325 mg Oral Given Gaby Adam RN 04/11/2019 2244 sodium chloride 0 9 % infusion 125 mL/hr Intravenous [facility] [address] [facility] Brendan Renee, RN     04/11/2019 2244 heparin (porcine) subcutaneous injection 5,000 Units 5,000 Units Subcutaneous Given Arthur Sethi RN          Assessment and Plan     Patient Active Problem List   Diagnosis    Multiple injuries    Ambulatory dysfunction    Falls frequently    Prediabetes    Non-ST elevation myocardial infarction (NSTEMI), type 2    HTN (hypertension)    Mild cognitive impairment    Morbid obesity with BMI of 45 0-49 9, adult ([facility] [address] )    Knee pain    Multiple wounds of skin    Left-sided weakness           Left sided weakness:  With facial droop, 2/5 left upper extremity strength, 4+ out of 5 left lower extremity strength though likely secondary to knee pain  Patient unsure when his last known normal was and is thus not a candidate for tPA  CT head without acute finding   -aspirin 81 and Lipitor 80  -checking A1c and lipid panel  -MRI  -PT OT  -telemetry  -neuro checks  -dysphagia assessment prior to starting diet  -up with assistance    Ambulatory dysfunction:  Secondary to knee pain and the above  Uses a cane at baseline  -PT/OT  -fall precautions  -up with assistance    Prediabetes  -will check A1c    HTN  -hold antihypertensives in the setting of possible stroke  -permissive hypertension    Leukocytosis: possibly reactive  Patient afebrile  -Continue to monitor  Knee pain  -Tylenol        Code Status: Level 1 - Full Code  VTE Pharmacologic Prophylaxis: Sequential compression device (Venodyne)    VTE Mechanical Prophylaxis: sequential compression device  Admission Status: OBSERVATION    Admission Time  I spent 30 minutes admitting the patient  This involved direct patient contact where I performed a full history and physical, reviewing previous records, and reviewing laboratory and other diagnostic studies      Nydia Fish MD  Internal Medicine  PGY-1

## 2019-04-12 NOTE — ED NOTES
Patient moved to room 25 and placed in a hospital bed at this time  New sheets and gown provided, patient resting comfortably        Vita Martinez RN  04/12/19 3388

## 2019-04-12 NOTE — H&P
INTERNAL MEDICINE HISTORY AND PHYSICAL  ED 10 SOD Team A    NAME: Samanta Esquivel  AGE: 70 y o  SEX: male  : 1950   MRN: [id number]  ENCOUNTER: [id number]    DATE: 2019  TIME: 11:31 PM    Primary Care Physician: No primary care provider on file  Admitting Provider: Brea Petty MD    Chief complaint:  Left-sided weakness    History of Present Illness     Samanta Esquivel is a 70 y o  male medical history of mild cognitive impairment chronic left knee pain and hypertension  Patient does not follow with a doctor and takes no meds  Patient does report a history of left knee surgery, states no hardware was placed at that time  Earlier this afternoon patient was attempted to cross the street  Patient is not able to describe clearly what happened, but it sounds like he had to rash across the street to avoid a car  Once again across the street he started feeling weak and needed to lean up against a telephone pole  Patient then began to slump over  Patient was noted by bystanders to be slumping against telephone pole  He denies falls  Denies hitting his head  Denies that this episode anything to do with his knee pain which he states is at baseline  An ambulance was called and patient was brought to the ED  Patient was initially worked up for left-sided knee pain  Stated at that time that he was able to ambulate  X-ray of left knee negative for any acute abnormality  Patient was subsequently re-examined and was found to have difficulty sitting up or standing  With left-sided weakness  He was unsure how long going on at that time or whether left-sided weakness is new or old  CTA head and neck obtained and without acute hemorrhage  It did reveal old lacunar infarcts  The rest of patient's workup significant for hypertension with systolic blood pressures in the 160s 170s  CBC with mild leukocytosis 13 4  CMP unremarkable  Troponin negative and EKG unremarkable    PT INR essentially normal     Patient states that he lives by himself and walks with a cane at baseline  He is able to perform all ADLs  He has a friend that comes over once a month and helps him with IADLs  He does not currently have a job, but use to wash dishes at [employer]  Review of Systems   Review of Systems   Constitutional: Positive for fatigue  Negative for activity change, appetite change, chills and fever  Respiratory: Negative for apnea, cough, choking, chest tightness, shortness of breath, wheezing and stridor  Cardiovascular: Negative for chest pain, palpitations and leg swelling  Gastrointestinal: Negative for abdominal distention, abdominal pain, anal bleeding, blood in stool, constipation, diarrhea, nausea, rectal pain and vomiting  Past Medical History     Past Medical History:   Diagnosis Date    Hypertension     MCI (mild cognitive impairment)        Past Surgical History   History reviewed  No pertinent surgical history  Social History     Social History     Substance and Sexual Activity   Alcohol Use No     Social History     Substance and Sexual Activity   Drug Use No     Social History     Tobacco Use   Smoking Status Never Smoker   Smokeless Tobacco Never Used       Family History   History reviewed  No pertinent family history  Medications Prior to Admission     Prior to Admission medications    Medication Sig Start Date End Date Taking?  Authorizing Provider   amLODIPine (NORVASC) 5 mg tablet Take 1 tablet by mouth daily 12/13/17   Pierre Thacker MD   docusate sodium (COLACE) 100 mg capsule Take 1 capsule by mouth 2 (two) times a day for 30 days 12/12/17 4/11/19  Pierre Thacker MD       Allergies   No Known Allergies    Objective     Vitals:    04/11/19 1840 04/11/19 1945 04/11/19 2054 04/11/19 2155   BP: (!) 177/88 167/89 126/99 162/96   BP Location: Right arm Right arm Right arm Right arm   Pulse: (!) 106 98 99 90   Resp: 19 19 19 17   Temp:       TempSrc:       SpO2: 94% 94% 94% 96%     There is no height or weight on file to calculate BMI  No intake or output data in the 24 hours ending 04/11/19 2331  Invasive Devices     Peripheral Intravenous Line            Peripheral IV 04/11/19 Right Antecubital less than 1 day                Physical Exam    Physical Exam   Constitutional: He is oriented to person, place, and time  He appears well-developed and well-nourished  No distress  HENT:   Head: Normocephalic and atraumatic  Eyes: Pupils are equal, round, and reactive to light  Conjunctivae are normal  Right eye exhibits no discharge  Left eye exhibits no discharge  No scleral icterus  Neck: Normal range of motion  Neck supple  No JVD present  No tracheal deviation present  No thyromegaly present  Cardiovascular: Normal rate, regular rhythm, normal heart sounds and intact distal pulses  Exam reveals no gallop and no friction rub  No murmur heard  Pulmonary/Chest: Effort normal and breath sounds normal  No stridor  No respiratory distress  He has no wheezes  He has no rales  He exhibits no tenderness  Abdominal: Soft  Bowel sounds are normal  He exhibits no distension and no mass  There is no tenderness  There is no rebound and no guarding  No hernia  Musculoskeletal: Normal range of motion  He exhibits no edema, tenderness or deformity  Lymphadenopathy:     He has no cervical adenopathy  Neurological: He is alert and oriented to person, place, and time  CN: No visual field deficit  Full range of motion during EOM though patient notes diplopia during exam, with noted eye crossing at that time  With left sided facial droop noted  Pupillary reflex intact  Hearing intactt  Facial sensation intact  Upper CN 7 intact  With left shoulder shrug weakness  CN 10 intact  CN 12 intact  Motor:   LUE 2/5; LLE 4+/5 though likely secondary to pain in knee  R side 5/5  Sensation : intact throughout    Reflexes: somewhat brisk  Finger to nose and heel to shin intact  Down going babinski  Skin: Skin is warm and dry  No rash noted  He is not diaphoretic  No erythema  No pallor  Vitals reviewed  Lab Results: I have personally reviewed pertinent reports  CBC:   Results from last 7 days   Lab Units 04/11/19  1837   WBC Thousand/uL 13 35*   RBC Million/uL 4 78   HEMOGLOBIN g/dL 14 0   HEMATOCRIT % 43 7   MCV fL 91   MCH pg 29 3   MCHC g/dL 32 0   RDW % 14 3   MPV fL 10 7   PLATELETS Thousands/uL 314   NRBC AUTO /100 WBCs 0   NEUTROS PCT % 84*   LYMPHS PCT % 9*   MONOS PCT % 7   EOS PCT % 0   BASOS PCT % 0   NEUTROS ABS Thousands/µL 11 14*   LYMPHS ABS Thousands/µL 1 16   MONOS ABS Thousand/µL 0 95   EOS ABS Thousand/µL 0 00   , Chemistry Profile:   Results from last 7 days   Lab Units 04/11/19  1837   POTASSIUM mmol/L 3 6   CHLORIDE mmol/L 109*   CO2 mmol/L 23   BUN mg/dL 10   CREATININE mg/dL 1 18   CALCIUM mg/dL 9 0   AST U/L 14   ALT U/L 16   ALK PHOS U/L 113   EGFR ml/min/1 73sq m 63       Imaging: I have personally reviewed pertinent films in PACS  Cta Head And Neck With And Without Contrast    Result Date: 4/11/2019  Narrative: CTA NECK AND BRAIN WITH AND WITHOUT CONTRAST INDICATION: left sided weakness COMPARISON:   None  TECHNIQUE:  Routine CT imaging of the Brain without contrast   Post contrast imaging was performed after administration of iodinated contrast through the neck and brain  Post contrast axial 0 625 mm images timed to opacify the arterial system  3D rendering was performed on an independent workstation  MIP reconstructions performed  Coronal reconstructions were performed of the noncontrast portion of the brain  Radiation dose length product (DLP) for this visit:  1512 mGy-cm   This examination, like all CT scans performed in the [facility], was performed utilizing techniques to minimize radiation dose exposure, including the use of iterative reconstruction and automated exposure control     IV Contrast:  85 mL of iohexol (OMNIPAQUE)  IMAGE QUALITY:   Diagnostic FINDINGS: NONCONTRAST BRAIN PARENCHYMA: Decreased attenuation is noted in periventricular and subcortical white matter demonstrating an appearance that is statistically most likely to represent moderate microangiopathic change; this appearance is similar when compared to most recent prior examination  Old infarcts identified in the bilateral thalami, right basal ganglia and left putamen  No intracranial mass, mass effect or midline shift  No acute parenchymal hemorrhage  VENTRICLES AND EXTRA-AXIAL SPACES:  Normal for the patient's age  VISUALIZED ORBITS AND PARANASAL SINUSES:  Unremarkable  CERVICAL VASCULATURE AORTIC ARCH AND GREAT VESSELS:  Normal aortic arch and great vessel origins  Normal visualized subclavian vessels  RIGHT VERTEBRAL ARTERY CERVICAL SEGMENT:  Mild stenosis at the origin  The vessel is normal in caliber throughout the neck  LEFT VERTEBRAL ARTERY CERVICAL SEGMENT:  Normal origin  The vessel is normal in caliber throughout the neck  RIGHT EXTRACRANIAL CAROTID SEGMENT:  Mild atherosclerotic disease of the distal common carotid artery and proximal cervical internal carotid artery without significant stenosis compared to the more distal ICA  LEFT EXTRACRANIAL CAROTID SEGMENT:  Mild atherosclerotic disease of the distal common carotid artery and proximal cervical internal carotid artery without significant stenosis compared to the more distal ICA  NASCET criteria was used to determine the degree of internal carotid artery diameter stenosis  INTRACRANIAL VASCULATURE INTERNAL CAROTID ARTERIES:  Moderate carotid siphon atherosclerotic plaque  ANTERIOR CIRCULATION:  Symmetric A1 segments and anterior cerebral arteries with normal enhancement  Normal anterior communicating artery  MIDDLE CEREBRAL ARTERY CIRCULATION:  M1 segment and middle cerebral artery branches demonstrate normal enhancement bilaterally   DISTAL VERTEBRAL ARTERIES:  Normal distal vertebral arteries  Posterior inferior cerebellar artery origins are normal  Normal vertebral basilar junction  BASILAR ARTERY:  Basilar artery is normal in caliber  Normal superior cerebellar arteries  POSTERIOR CEREBRAL ARTERIES: Both posterior cerebral arteries arises from the basilar tip  Both arteries demonstrate normal enhancement  Normal posterior communicating arteries  DURAL VENOUS SINUSES:  Normal  NON VASCULAR ANATOMY BONY STRUCTURES:  No acute osseous abnormality  SOFT TISSUES OF THE NECK:  Normal  THORACIC INLET:  Right paratracheal 1 3 cm lymph node  Impression: No evidence of acute intracranial hemorrhage  No evidence of hemodynamic significant stenosis, aneurysm or dissection  Old lacunar infarcts  Nonspecific 1 3 cm right paratracheal lymph node  Workstation performed: [id number]     Xr Knee 4+ Vw Left Injury    Result Date: 4/11/2019  Narrative: LEFT KNEE INDICATION:   left knee injury  COMPARISON:  12/6/2017 VIEWS:  XR KNEE 4+ VW LEFT INJURY FINDINGS: There is no acute fracture or dislocation  There is no joint effusion  No significant degenerative changes  No lytic or blastic lesions are seen  Soft tissues are unremarkable  Impression: No acute osseous abnormality  Workstation performed: [id number]       Microbiology: cultures obtained in emergency department include     Urinalysis:       Invalid input(s): URIBILINOGEN     Urine Micro:        EKG, Pathology, and Other Studies: I have personally reviewed pertinent reports        Medications given in Emergency Department     Medication Administration - last 24 hours from 04/10/2019 2331 to 04/11/2019 2331       Date/Time Order Dose Route Action Action by     04/11/2019 1712 acetaminophen (TYLENOL) tablet 975 mg 975 mg Oral Given Abdelrahman Salazar RN     04/11/2019 1934 iohexol (OMNIPAQUE) 350 MG/ML injection (MULTI-DOSE) 85 mL 85 mL Intravenous Given Alix Broderick     04/11/2019 2244 aspirin tablet 325 mg 325 mg Oral Given Abdelrahman Salazar RN 04/11/2019 2244 sodium chloride 0 9 % infusion 125 mL/hr Intravenous New [address] [facility] Amadou Wang, GRACE     04/11/2019 2244 heparin (porcine) subcutaneous injection 5,000 Units 5,000 Units Subcutaneous Given Nik Hunter RN          Assessment and Plan     Patient Active Problem List   Diagnosis    Multiple injuries    Ambulatory dysfunction    Falls frequently    Prediabetes    Non-ST elevation myocardial infarction (NSTEMI), type 2    HTN (hypertension)    Mild cognitive impairment    Morbid obesity with BMI of 45 0-49 9, adult ([facility] [address] )    Knee pain    Multiple wounds of skin    Left-sided weakness           Left sided weakness:  With facial droop, 2/5 left upper extremity strength, 4+ out of 5 left lower extremity strength though likely secondary to knee pain  Patient unsure when his last known normal was and is thus not a candidate for tPA  CT head without acute finding   -aspirin 81 and Lipitor 80  -checking A1c and lipid panel  -MRI  -PT OT  -telemetry  -neuro checks  -dysphagia assessment prior to starting diet  -up with assistance    Ambulatory dysfunction:  Secondary to knee pain and the above  Uses a cane at baseline  -PT/OT  -fall precautions  -up with assistance    Prediabetes  -will check A1c    HTN  -hold antihypertensives in the setting of possible stroke  -permissive hypertension    Leukocytosis: possibly reactive  Patient afebrile  -Continue to monitor  Knee pain  -Tylenol        Code Status: Level 1 - Full Code  VTE Pharmacologic Prophylaxis: Sequential compression device (Venodyne)    VTE Mechanical Prophylaxis: sequential compression device  Admission Status: OBSERVATION    Admission Time  I spent 30 minutes admitting the patient  This involved direct patient contact where I performed a full history and physical, reviewing previous records, and reviewing laboratory and other diagnostic studies      Irma Carlton MD  Internal Medicine  PGY-1

## 2019-04-12 NOTE — ED NOTES
Pt  Bed sheets changed  Pt had soiled linens on way to [facility]       Ingris Brown  04/12/19 9849

## 2019-04-12 NOTE — UTILIZATION REVIEW
Initial Clinical Review    Admission: Date/Time/Statement:  04/11/2019 @ 2031  Orders Placed This Encounter   Procedures    Place in Observation     Standing Status:   Standing     Number of Occurrences:   1     Order Specific Question:   Admitting Physician     Answer:   NASEEM VELEZ [[id number]]     Order Specific Question:   Level of Care     Answer:   Med Surg [16]     ED: Date/Time/Mode of Arrival:   ED Arrival Information     Expected Arrival Acuity Means of Arrival Escorted By Service Admission Type    - 4/11/2019 16:11 Urgent [facility] Urgent    Arrival Complaint    -        Chief Complaint:   Chief Complaint   Patient presents with    Knee Pain     pt was walking home from [place] and stood against a telephone pole and slid down it  pt reports L knee pain, denies LO, denies head strike  Assessment/Plan: Yadira Biswas is a 70 y o  male medical history of mild cognitive impairment chronic left knee pain and hypertension  Patient does not follow with a doctor and takes no meds  Had to rash across the street to avoid a car  Once again across the street he started feeling weak and needed to lean up against a telephone pole  An ambulance was called and patient was brought to the ED  Patient was initially worked up for left-sided knee pain  Stated at that time that he was able to ambulate  X-ray of left knee negative for any acute abnormality  Patient was subsequently re-examined and was found to have difficulty sitting up or standing  With left-sided weakness  He was unsure how long going on at that time or whether left-sided weakness is new or old  CTA head and neck obtained and without acute hemorrhage  It did reveal old lacunar infarcts  Left sided weakness:  With facial droop, 2/5 left upper extremity strength, 4+ out of 5 left lower extremity strength though likely secondary to knee pain    Patient unsure when his last known normal was and is thus not a candidate for tPA  CT head without acute finding  aspirin 81 and Lipitor 80  checking A1c and lipid panel  MRI  PT OT  telemetry  neuro checks  dysphagia assessment prior to starting diet  up with assistance  Ambulatory dysfunction:  Secondary to knee pain and the above  Uses a cane at baseline  PT/OT  fall precautions  up with assistance  Prediabetes  will check A1c  HTN  hold antihypertensives in the setting of possible stroke  permissive hypertension  Leukocytosis: possibly reactive  Patient afebrile  Continue to monitor  Knee pain  Tylenol  VTE Pharmacologic Prophylaxis: Sequential compression device (Venodyne)    VTE Mechanical Prophylaxis: sequential compression device  Admission Status: OBSERVATION  ED Vital Signs:   ED Triage Vitals   Temperature Pulse Respirations Blood Pressure SpO2   04/11/19 1618 04/11/19 1618 04/11/19 1618 04/11/19 1618 04/11/19 1618   99 1 °F (37 3 °C) 104 20 (!) 176/95 98 %      Temp Source Heart Rate Source Patient Position - Orthostatic VS BP Location FiO2 (%)   04/11/19 1618 04/11/19 1618 04/11/19 1618 04/11/19 1618 --   Oral Monitor Lying Right arm       Pain Score       04/11/19 1712       Worst Possible Pain        Wt Readings from Last 1 Encounters:   12/06/17 95 6 kg (210 lb 12 2 oz)   Neuro checks  GCS 15, 15, 15, 14, 14, 14, 15, 15, 15,   Pertinent Labs/Diagnostic Test Results:   WBC Thousand/uL 13 35*   RBC Million/uL 4 78   HEMOGLOBIN g/dL 14 0   HEMATOCRIT % 43 7   Platelets 305  POTASSIUM mmol/L 3 6   CHLORIDE mmol/L 109*   CO2 mmol/L 23   BUN mg/dL 10   CREATININE mg/dL 1 18   CALCIUM mg/dL 9 0   AST U/L 14   ALT U/L 16   ALK PHOS U/L 113   EGFR ml/min/1 73sq m 63     CT head:  No evidence of acute intracranial hemorrhage  No evidence of hemodynamic significant stenosis, aneurysm or dissection  Old lacunar infarcts  Nonspecific 1 3 cm right paratracheal lymph node       Left knee X:  No acute osseous abnormality  ED Treatment:   Medication Administration from 04/11/2019 1611 to 04/12/2019 0810       Date/Time Order Dose Route Action Action by Comments     04/11/2019 1712 acetaminophen (TYLENOL) tablet 975 mg 975 mg Oral Given Jama Quick RN      04/11/2019 1934 iohexol (OMNIPAQUE) 350 MG/ML injection (MULTI-DOSE) 85 mL 85 mL Intravenous Given Claudio Morin      04/11/2019 2244 aspirin tablet 325 mg 325 mg Oral Given Jama Quick RN      04/11/2019 2333 sodium chloride 0 9 % infusion 0 mL/hr Intravenous Stopped Matthew Merrill, RN      04/11/2019 2244 sodium chloride 0 9 % infusion 125 mL/hr Intravenous [address] [facility] Hawkins Poster, RN      04/12/2019 3482 heparin (porcine) subcutaneous injection 5,000 Units 5,000 Units Subcutaneous Given Nadia Doe, RN      04/11/2019 2244 heparin (porcine) subcutaneous injection 5,000 Units 5,000 Units Subcutaneous Given Jama Quick RN      04/12/2019 0805 aspirin chewable tablet 81 mg 81 mg Oral Given Mindy Ortega, RN      04/12/2019 9612 gadobutrol injection (MULTI-DOSE) SOLN 10 mL 10 mL Intravenous Given Curtis Mcdonald         Past Medical/Surgical History:    Active Ambulatory Problems     Diagnosis Date Noted    Multiple injuries 12/05/2017    Ambulatory dysfunction 12/05/2017    Falls frequently 12/05/2017    Prediabetes 12/05/2017    Non-ST elevation myocardial infarction (NSTEMI), type 2 12/05/2017    HTN (hypertension) 12/05/2017    Mild cognitive impairment 12/05/2017    Morbid obesity with BMI of 45 0-49 9, adult ([facility] ) 12/07/2017     Resolved Ambulatory Problems     Diagnosis Date Noted    Rhabdomyolysis 12/05/2017    [facility] 12/05/2017    Lactic acid acidosis 12/05/2017    SIRS (systemic inflammatory response syndrome) ([facility] ) 12/05/2017     Past Medical History:   Diagnosis Date    Hypertension     MCI (mild cognitive impairment)      Admitting Diagnosis: Knee pain [M25 [id number]]  Age/Sex: 70 y o  male  Admission Orders:  Scheduled Meds:   Current Facility-Administered Medications:  acetaminophen 650 mg Oral Q4H PRN Javier Mosqueda MD   aspirin 81 mg Oral Daily Shana Downs DO   atorvastatin 80 mg Oral Daily With Trenton Bazan MD   heparin (porcine) 5,000 Units Subcutaneous [facility] Javier Mosqueda MD     Dysphagia assessment > Regular diet  Up with assistance  IS q1h x 10 while awake  Neuro checks q1h x4, q2h x4, then q4h  GCS 15  MRI brain: pending  Consult PT/OT  TELM  Sal SCDs    ==========================================================================    Continued Stay Review  OBSERVATION 04/11/2019 @ 2031 , CONVERTED TO INPATIENT ADMISSION 04/12/2019 @ 0922, DUE TO FURTHER DIAGNOSTIC WORKUP, REQUIRING AT LEAST 2 MIDNIGHT STAY  New stroke identified  Date: 04/12/2019 04/12/19 8641  Inpatient Admission Once     Transfer Service: General Medicine       Question Answer Comment   Admitting Physician NASEEM VELEZ    Level of Care Med Surg    Estimated length of stay More than 2 Midnights    Certification I certify that inpatient services are medically necessary for this patient for a duration of greater than two midnights  See H&P and MD Progress Notes for additional information about the patient's course of treatment            Vital Signs: /80   Pulse 94   Temp 99 1 °F (37 3 °C) (Oral)   Resp 16   SpO2 94%   Assessment/Plan: see neuro consult  Medications:   Scheduled Meds:   Current Facility-Administered Medications:  acetaminophen 650 mg Oral Q4H PRN Javier Mosqueda MD   aspirin 81 mg Oral Daily Shana Downs DO   atorvastatin 80 mg Oral Daily With Trenton Bazan MD   heparin (porcine) 5,000 Units Subcutaneous [facility] Javier Mosqueda MD   Pertinent Labs/Diagnostic Results:   MRI brain:  Acute right thalamus/basal ganglia infarcts  Age/Sex: 70 y o  male   Discharge Plan: TBD    -------------------------------------------------------------------------------------------------------------------------    Consult Neuro  R thalamus/basal ganglia infarcts Stroke pathway intiated  Continue ASA 81mg  Will load patient with Plavix 600mg now, and then will start patient on Plavix 75mg tomorrow  Patient will be on DAPT for 30 days, and then will stop Plavix 75mg and continue ASA 81mg     Continue Lipitor 80mg  Echocardiogram pending  Telemetry  Secondary risk factor modification  Goal of normotension and euglycemia  PT/OT/ST  Frequent neuro checks  STAT CT head with acute worsening in neuro exam/mental status  Notify Neurology with any changes in exam  ----------------------------------------------------------------------------------------------------------------------------------------------

## 2019-04-13 ENCOUNTER — APPOINTMENT (INPATIENT)
Dept: NON INVASIVE DIAGNOSTICS | Facility: HOSPITAL | Age: 69
DRG: 065 | End: 2019-04-13
Payer: MEDICARE

## 2019-04-13 LAB
ANION GAP SERPL CALCULATED.3IONS-SCNC: 6 MMOL/L (ref 4–13)
BASOPHILS # BLD AUTO: 0.05 THOUSANDS/ΜL (ref 0–0.1)
BASOPHILS NFR BLD AUTO: 1 % (ref 0–1)
BUN SERPL-MCNC: 16 MG/DL (ref 5–25)
CALCIUM SERPL-MCNC: 8.8 MG/DL (ref 8.3–10.1)
CHLORIDE SERPL-SCNC: 111 MMOL/L (ref 100–108)
CO2 SERPL-SCNC: 22 MMOL/L (ref 21–32)
CREAT SERPL-MCNC: 0.96 MG/DL (ref 0.6–1.3)
EOSINOPHIL # BLD AUTO: 0.07 THOUSAND/ΜL (ref 0–0.61)
EOSINOPHIL NFR BLD AUTO: 1 % (ref 0–6)
ERYTHROCYTE [DISTWIDTH] IN BLOOD BY AUTOMATED COUNT: 14.5 % (ref 11.6–15.1)
GFR SERPL CREATININE-BSD FRML MDRD: 80 ML/MIN/1.73SQ M
GLUCOSE SERPL-MCNC: 93 MG/DL (ref 65–140)
HCT VFR BLD AUTO: 47.2 % (ref 36.5–49.3)
HGB BLD-MCNC: 14.5 G/DL (ref 12–17)
IMM GRANULOCYTES # BLD AUTO: 0.01 THOUSAND/UL (ref 0–0.2)
IMM GRANULOCYTES NFR BLD AUTO: 0 % (ref 0–2)
LYMPHOCYTES # BLD AUTO: 1.43 THOUSANDS/ΜL (ref 0.6–4.47)
LYMPHOCYTES NFR BLD AUTO: 21 % (ref 14–44)
MCH RBC QN AUTO: 29.6 PG (ref 26.8–34.3)
MCHC RBC AUTO-ENTMCNC: 30.7 G/DL (ref 31.4–37.4)
MCV RBC AUTO: 96 FL (ref 82–98)
MONOCYTES # BLD AUTO: 0.79 THOUSAND/ΜL (ref 0.17–1.22)
MONOCYTES NFR BLD AUTO: 12 % (ref 4–12)
NEUTROPHILS # BLD AUTO: 4.48 THOUSANDS/ΜL (ref 1.85–7.62)
NEUTS SEG NFR BLD AUTO: 65 % (ref 43–75)
NRBC BLD AUTO-RTO: 0 /100 WBCS
PLATELET # BLD AUTO: 247 THOUSANDS/UL (ref 149–390)
PMV BLD AUTO: 11.1 FL (ref 8.9–12.7)
POTASSIUM SERPL-SCNC: 3.9 MMOL/L (ref 3.5–5.3)
RBC # BLD AUTO: 4.9 MILLION/UL (ref 3.88–5.62)
SODIUM SERPL-SCNC: 139 MMOL/L (ref 136–145)
WBC # BLD AUTO: 6.83 THOUSAND/UL (ref 4.31–10.16)

## 2019-04-13 PROCEDURE — 80048 BASIC METABOLIC PNL TOTAL CA: CPT | Performed by: INTERNAL MEDICINE

## 2019-04-13 PROCEDURE — 93306 TTE W/DOPPLER COMPLETE: CPT

## 2019-04-13 PROCEDURE — G8987 SELF CARE CURRENT STATUS: HCPCS

## 2019-04-13 PROCEDURE — G8978 MOBILITY CURRENT STATUS: HCPCS

## 2019-04-13 PROCEDURE — 93306 TTE W/DOPPLER COMPLETE: CPT | Performed by: INTERNAL MEDICINE

## 2019-04-13 PROCEDURE — G8979 MOBILITY GOAL STATUS: HCPCS

## 2019-04-13 PROCEDURE — G8988 SELF CARE GOAL STATUS: HCPCS

## 2019-04-13 PROCEDURE — 99232 SBSQ HOSP IP/OBS MODERATE 35: CPT | Performed by: INTERNAL MEDICINE

## 2019-04-13 PROCEDURE — 92610 EVALUATE SWALLOWING FUNCTION: CPT

## 2019-04-13 PROCEDURE — G8997 SWALLOW GOAL STATUS: HCPCS

## 2019-04-13 PROCEDURE — 97167 OT EVAL HIGH COMPLEX 60 MIN: CPT

## 2019-04-13 PROCEDURE — G8998 SWALLOW D/C STATUS: HCPCS

## 2019-04-13 PROCEDURE — 97163 PT EVAL HIGH COMPLEX 45 MIN: CPT

## 2019-04-13 PROCEDURE — 83918 ORGANIC ACIDS TOTAL QUANT: CPT | Performed by: INTERNAL MEDICINE

## 2019-04-13 PROCEDURE — G8996 SWALLOW CURRENT STATUS: HCPCS

## 2019-04-13 PROCEDURE — 85025 COMPLETE CBC W/AUTO DIFF WBC: CPT | Performed by: INTERNAL MEDICINE

## 2019-04-13 RX ORDER — AMLODIPINE BESYLATE 5 MG/1
5 TABLET ORAL DAILY
Status: DISCONTINUED | OUTPATIENT
Start: 2019-04-13 | End: 2019-04-15 | Stop reason: HOSPADM

## 2019-04-13 RX ADMIN — CLOPIDOGREL BISULFATE 75 MG: 75 TABLET ORAL at 09:38

## 2019-04-13 RX ADMIN — ASPIRIN 81 MG 81 MG: 81 TABLET ORAL at 09:38

## 2019-04-13 RX ADMIN — ATORVASTATIN CALCIUM 80 MG: 80 TABLET, FILM COATED ORAL at 18:23

## 2019-04-13 RX ADMIN — HEPARIN SODIUM 5000 UNITS: 5000 INJECTION INTRAVENOUS; SUBCUTANEOUS at 05:34

## 2019-04-13 RX ADMIN — AMLODIPINE BESYLATE 5 MG: 5 TABLET ORAL at 09:38

## 2019-04-13 RX ADMIN — HEPARIN SODIUM 5000 UNITS: 5000 INJECTION INTRAVENOUS; SUBCUTANEOUS at 14:26

## 2019-04-13 RX ADMIN — HEPARIN SODIUM 5000 UNITS: 5000 INJECTION INTRAVENOUS; SUBCUTANEOUS at 21:55

## 2019-04-13 NOTE — PROGRESS NOTES
IM Residency Progress Note   Unit/Bed#: [facility] 721-01 Encounter: [id number]  SOD Team A      Zia Avendano 70 y o  male [address] Stay Days: 1      Assessment/Plan:    Principal Problem:    Left-sided weakness  Active Problems:    Ambulatory dysfunction    Falls frequently    Prediabetes    HTN (hypertension)    Mild cognitive impairment    Morbid obesity with BMI of 45 0-49 9, adult (HCC)    Knee pain    Multiple wounds of skin    Left sided weakness:  With facial droop left lower extremity strength  Patient unsure when his last known normal was and is thus not a candidate for tPA  -Patient was loaded with plavix, will continue DAPT for 1 month and then aspirin and lipitor thereafter  -Patient will need outpatient follow-up with stroke clinic  -A1c normal and lipid panel grossly normal  -MRI shows acute thalamic and basal ganglia infarct  -PT OT  -neuro checks  -up with assistance     Ambulatory dysfunction:  Secondary to knee pain and the above  Uses a cane at baseline  -PT/OT  -fall precautions  -up with assistance     Prediabetes  -A1c normal     HTN  -Start amlodipine 5mg    Leukocytosis: possibly reactive  Patient afebrile  -Continue to monitor       Knee pain  -Tylenol        Disposition:  PTOT eval pending      Subjective:   Patient says he is comfortable  Denies any complaints at this point, able to eat breakfast, no further weakness, numbness, tingling  Vitals: Temp (24hrs), Av 8 °F (37 1 °C), Min:98 6 °F (37 °C), Max:99 1 °F (37 3 °C)  Current: Temperature: 98 6 °F (37 °C)  Vitals:    19 1935 19 2235 19 0600 19 0828   BP: 150/93 145/92  165/93   BP Location: Left arm Left arm  Left arm   Pulse: 88 84  83   Resp: 20 18  16   Temp: 98 8 °F (37 1 °C) 98 6 °F (37 °C)  98 6 °F (37 °C)   TempSrc: Oral Oral  Oral   SpO2: 96% 96%  99%   Weight:   90 6 kg (199 lb 11 8 oz)    Height:        Body mass index is 36 53 kg/m²  I/O last 24 hours:   In: 540 [P O :540]  Out: -       Physical Exam: Physical Exam   Constitutional: He is oriented to person, place, and time  He appears well-developed and well-nourished  HENT:   Head: Normocephalic  Eyes: Pupils are equal, round, and reactive to light  Cardiovascular: Normal rate and regular rhythm  Pulmonary/Chest: Effort normal and breath sounds normal    Abdominal: Soft  Bowel sounds are normal    Neurological: He is alert and oriented to person, place, and time  Left upper and lower extremity 3/5  No sensory loss  Left-sided facial droop present   Vitals reviewed        Invasive Devices     Peripheral Intravenous Line            Peripheral IV 04/11/19 Right Antecubital 1 day                          Labs:   Recent Results (from the past 24 hour(s))   CBC and differential    Collection Time: 04/13/19  5:56 AM   Result Value Ref Range    WBC 6 83 4 31 - 10 16 Thousand/uL    RBC 4 90 3 88 - 5 62 Million/uL    Hemoglobin 14 5 12 0 - 17 0 g/dL    Hematocrit 47 2 36 5 - 49 3 %    MCV 96 82 - 98 fL    MCH 29 6 26 8 - 34 3 pg    MCHC 30 7 (L) 31 4 - 37 4 g/dL    RDW 14 5 11 6 - 15 1 %    MPV 11 1 8 9 - 12 7 fL    Platelets 994 188 - 558 Thousands/uL    nRBC 0 /100 WBCs    Neutrophils Relative 65 43 - 75 %    Immat GRANS % 0 0 - 2 %    Lymphocytes Relative 21 14 - 44 %    Monocytes Relative 12 4 - 12 %    Eosinophils Relative 1 0 - 6 %    Basophils Relative 1 0 - 1 %    Neutrophils Absolute 4 48 1 85 - 7 62 Thousands/µL    Immature Grans Absolute 0 01 0 00 - 0 20 Thousand/uL    Lymphocytes Absolute 1 43 0 60 - 4 47 Thousands/µL    Monocytes Absolute 0 79 0 17 - 1 22 Thousand/µL    Eosinophils Absolute 0 07 0 00 - 0 61 Thousand/µL    Basophils Absolute 0 05 0 00 - 0 10 Thousands/µL   Basic metabolic panel    Collection Time: 04/13/19  5:56 AM   Result Value Ref Range    Sodium 139 136 - 145 mmol/L    Potassium 3 9 3 5 - 5 3 mmol/L    Chloride 111 (H) 100 - 108 mmol/L    CO2 22 21 - 32 mmol/L    ANION GAP 6 4 - 13 mmol/L BUN 16 5 - 25 mg/dL    Creatinine 0 96 0 60 - 1 30 mg/dL    Glucose 93 65 - 140 mg/dL    Calcium 8 8 8 3 - 10 1 mg/dL    eGFR 80 ml/min/1 73sq m       Radiology Results: I have personally reviewed pertinent reports  Other Diagnostic Testing:   I have personally reviewed pertinent reports          Active Meds:   Current Facility-Administered Medications   Medication Dose Route Frequency    acetaminophen (TYLENOL) tablet 650 mg  650 mg Oral Q4H PRN    amLODIPine (NORVASC) tablet 5 mg  5 mg Oral Daily    aspirin chewable tablet 81 mg  81 mg Oral Daily    atorvastatin (LIPITOR) tablet 80 mg  80 mg Oral Daily With Dinner    clopidogrel (PLAVIX) tablet 75 mg  75 mg Oral Daily    heparin (porcine) subcutaneous injection 5,000 Units  5,000 Units Subcutaneous Q8H [facility]         VTE Pharmacologic Prophylaxis: Heparin  VTE Mechanical Prophylaxis: sequential compression device    Jesica German MD

## 2019-04-13 NOTE — PHYSICAL THERAPY NOTE
Physical Therapy Evaluation    Patient's Name: Bernardo Rao    Admitting Diagnosis  Knee pain [M25 569]  Left knee pain [M25 562]  Left-sided weakness [R53 1]  Multiple wounds of skin [R23 8]  Ischemic stroke ([facility] 75 ) [I63 9]    Problem List  Patient Active Problem List   Diagnosis    Multiple injuries    Ambulatory dysfunction    Falls frequently    Prediabetes    Non-ST elevation myocardial infarction (NSTEMI), type 2    HTN (hypertension)    Mild cognitive impairment    Morbid obesity with BMI of 45 0-49 9, adult ([facility] )    Knee pain    Multiple wounds of skin    Left-sided weakness       Past Medical History  Past Medical History:   Diagnosis Date    Hypertension     MCI (mild cognitive impairment)        Past Surgical History  History reviewed  No pertinent surgical history  04/13/19 1000   Note Type   Note type Eval only   Pain Assessment   Pain Assessment 0-10   Pain Score 4   Pain Type Acute pain;Chronic pain   Pain Location Knee   Pain Orientation Bilateral   Patient's Stated Pain Goal No pain   Hospital Pain Intervention(s) Repositioned; Ambulation/increased activity   Response to Interventions unchanged   Home Living   Type of Home Apartment   Additional Comments Resides alone in apt- w/ [address]ICA per pt  Indep self care, ambulates w/ cane, also owns RW   Prior Function   Level of [place] Independent with ADLs and functional mobility   Falls in the last 6 months 1 to 4   Restrictions/Precautions   Weight Bearing Precautions Per Order No   Braces or Orthoses   (has brace L knee- chronic)   Other Precautions Cognitive; Chair Alarm; Bed Alarm; Impulsive;Multiple lines; Fall Risk;Pain;O2;Telemetry   General   Family/Caregiver Present No   Cognition   Overall Cognitive Status Impaired   Arousal/Participation Responsive   Orientation Level Oriented to person;Oriented to place   Memory Unable to assess   Following Commands Follows one step commands inconsistently   RLE Assessment   RLE Assessment   (strength grossly 4-/5)   LLE Assessment   LLE Assessment   (strength grossly 2/5)   Coordination   Movements are Fluid and Coordinated 0   Coordination and Movement Description L sided ataxia   Bed Mobility   Supine to Sit 3  Moderate assistance   Additional items Assist x 1   Sit to Supine 2  Maximal assistance   Additional items Assist x 1   Additional Comments some L sided lean in sitting, maintains w/ min A   Transfers   Sit to Stand 3  Moderate assistance   Additional items Assist x 1  (L knee kimberly- blocked by therapist)   Stand to Sit 3  Moderate assistance   Additional items Assist x 1   Ambulation/Elevation   Gait pattern   (ataxia, L foot drag, L knee kimberly, l lean)   Gait Assistance 3  Moderate assist   Additional items Assist x 1   Assistive Device   (HHA- start w/ one handed device vs RW depending on UE streng)   Distance 1-2 lateral side steps from foot of bed to [facility]   Balance   Static Sitting Fair -   Dynamic Sitting Poor +   Static Standing Poor   Dynamic Standing Poor   Endurance Deficit   Endurance Deficit Yes   Endurance Deficit Description fatigue, weakness,pain   Activity Tolerance   Activity Tolerance Patient limited by pain; Patient limited by fatigue;Treatment limited secondary to medical complications (Comment)   Nurse Made Aware yes   Assessment   Prognosis Fair   Problem List Decreased strength;Decreased endurance; Impaired balance;Decreased coordination;Decreased mobility; Impaired tone   Assessment Pt seen for high complexity physical therapy evaluation  Pt is a 69 y/o male w/ history/comorbidities of mild cognitive impairment, HTN now admitted w/ L sided weakness- found by observers, slumped against a pole- sent to ED, and MRI brain showed acute infarcts R thalamus and internal capsule  Due to acute medical issues w/ L sided weakness, pain, fall risk, and impulsivity, note unstable clinical picture  PT consutled to assess mobility, d c needs    Pt presents w  decreased functional mob, sitting snd standing balance, endurance, L > R sided strength w/ l tone changes, barriers at home  will benefit from skilled PT to correct for the above problems  Recommend rehab at d/c   Goals   Patient Goals to feel better   STG Expiration Date 04/27/19   Short Term Goal #1 1-2 wks: bed mob w/ indep, sitting balance to good dynamically, transfers w/ S, standing balance to fair w/ device, ambulate  ft w/ appropriate device and CGA/S, increase B LE strength by 1/2 -1 grade   Treatment Day 0   Plan   Treatment/Interventions Functional transfer training;LE strengthening/ROM; Elevations; Therapeutic exercise; Endurance training;Bed mobility;Gait training;Patient/family training;Equipment eval/education   PT Frequency   (4-6x wk)   Recommendation   Recommendation   (recommend rehab at d/c)   Equipment Recommended   (TBD)   PT - OK to Discharge Yes  (when stable to rehab)   Modified Gormania Scale   Modified Mirtha Scale 4   Barthel Index   Feeding 10   Bathing 0   Grooming Score 0   Dressing Score 0   Bladder Score 0   Bowels Score 10   Toilet Use Score 5   Transfers (Bed/Chair) Score 5   Mobility (Level Surface) Score 0   Stairs Score 0   Barthel Index Score 30         Lutricia Haftield PT, DPT, CSRS

## 2019-04-13 NOTE — OCCUPATIONAL THERAPY NOTE
[address] Evaluation     Patient Name: Allegra BUNCH Date: 4/13/2019  Problem List  Patient Active Problem List   Diagnosis    Multiple injuries    Ambulatory dysfunction    Falls frequently    Prediabetes    Non-ST elevation myocardial infarction (NSTEMI), type 2    HTN (hypertension)    Mild cognitive impairment    Morbid obesity with BMI of 45 0-49 9, adult ([facility] )    Knee pain    Multiple wounds of skin    Left-sided weakness     Past Medical History  Past Medical History:   Diagnosis Date    Hypertension     MCI (mild cognitive impairment)      Past Surgical History  History reviewed  No pertinent surgical history  04/13/19 1205   Note Type   Note type Eval only   Restrictions/Precautions   Weight Bearing Precautions Per Order No   Other Precautions Cognitive; Chair Alarm; Bed Alarm; Fall Risk;Pain   Pain Assessment   Pain Assessment 0-10   Pain Score Worst Possible Pain   Pain Type Acute pain;Chronic pain   Pain Location Knee   Pain Orientation Right   Patient's Stated Pain Goal 1   Hospital Pain Intervention(s) Repositioned; Ambulation/increased activity; Elevated; Emotional support;Relaxation technique; Rest   Response to Interventions Pt tolerated eval    Home Living   Type of Home Apartment   Home Layout One level   Bathroom Shower/Tub Tub/shower unit   Bathroom Toilet Standard   Home Equipment Cane   Additional Comments Pt reports he lives alone in a apartment with 40+ YESICA      Prior Function   Level of [place] Independent with ADLs and functional mobility   Lives With Alone   Receives Help From   (Pt reports no home support, he has )   ADL Assistance Independent   IADLs Independent   Falls in the last 6 months 1 to 4   Vocational Retired   Comments Pt reports he was independent in all areas of occupations PTA, uses [facility] for functional mobility    Lifestyle   Autonomy At baseline, pt was idnpendent in ADLs/IADLs, used [facility] for functional mob   Reciprocal Relationships Pt lives alone, reports no home support other then     Service to Others Pt is retired    Intrinsic Gratification Pt enjoys walking to [email or web address]    Subjective   Subjective "They are telling me I fell"    ADL   Where Assessed Edge of bed   Eating Assistance 4  Minimal Assistance   Eating Deficit Bringing food to mouth assist;Increased time to complete   Grooming Assistance 4  Minimal Assistance   UB Bathing Assistance 3  Moderate Assistance   LB Bathing Assistance 2  Maximal Assistance   [address] 3  Moderate Assistance   LB Dressing Assistance 2  Maximal [address]  2  Maximal [address] 2  Maximal Assistance   Functional Deficit Steadying;Verbal cueing;Supervision/safety; Increased time to complete   Additional Comments Pt required Mod A for UB ADls and Max A for LB ADLs    Bed Mobility   Supine to Sit 3  Moderate assistance   Additional items Assist x 1; Increased time required;LE management;Verbal cues   Sit to Supine 3  Moderate assistance   Additional items Assist x 2;Verbal cues;LE management; Increased time required   Additional Comments Pt required Mod Ax2 for sit to supine for repositioning    Transfers   Sit to Stand 3  Moderate assistance   Additional items Assist x 1; Increased time required;Verbal cues   Stand to Sit 3  Moderate assistance   Additional items Assist x 1; Increased time required;Verbal cues   Additional Comments Pt required Mod Ax1 using SPC for functional STS transfer   Balance   Static Sitting Fair -   Dynamic Sitting Poor +   Static Standing Poor   Dynamic Standing Poor   Activity Tolerance   Activity Tolerance Patient limited by fatigue;Patient limited by pain   Nurse Made Aware Pt appropriate to see per RN    RUE Assessment   RUE Assessment WFL   LUE Assessment   LUE Assessment X   Hand Function   Gross Motor Coordination Impaired  (LUE)   Fine Motor Coordination Impaired  (LUE)   Sensation   Light Touch No apparent deficits   Proprioception   Proprioception Partial deficits in the LUE   Vision-Basic Assessment   Current Vision Wears glasses only for reading   Vision - Complex Assessment   Ocular Range of Motion [facility]   Tracking Requires cues, head turns, or add eye shifts to track   Acuity Able to read clock/calendar on wall without difficulty   Perception   Motor Planning   (impaired motor planning )   Cognition   Overall Cognitive Status Impaired   Arousal/Participation Responsive; Cooperative   Attention Attends with cues to redirect   Orientation Level Oriented to person;Oriented to place; Disoriented to situation;Disoriented to time   Memory Decreased recall of recent events   Following Commands Follows one step commands inconsistently   Comments Pt alert, cooperative, required one step commands repeated and inconsistently followed  Pt presented with decreased articulation, 10/10 pain and disoriented to situation  Pt with bed alarm on at end of session with all needs in reach  Assessment   Limitation Decreased ADL status; Decreased UE ROM; Decreased UE strength;Decreased Safe judgement during ADL;Decreased cognition;Decreased endurance;Decreased self-care trans;Decreased high-level ADLs; Decreased fine motor control   Prognosis Good   Assessment Pt is a 70 y o  male who was admitted to [facility] on 4/11/19  With mild cognitive impairment, left knee pain, and HTN  Pt was slumped over against telephone pole as reported by bystanders  Pt's dx/ include: left sided weakness,  Ambulatory dysfunction, NSTEMI- type 2, morbid obesity  Pt PMH includes:HTN, MCI  Xray of L knee was negative for acute abnormality  CTA head and neck obtained and without acute hemorrhage, revealed old lacunar infarcts  MRI impression: Acute right thalamus/basal ganglia infarctsAt baseline pt was completing all areas of occupation independently and used [facility] for functional mobility  Pt lives with alone in a 1 floor apartment with 40 + [address]ICA, pt reports no support outside of his   Pt currently presents with impaired cognition, fall risk, decreased ADL status, pain, multiple lines, bed/chair alarm, telemetry  Currently pt completes functional mobility/transfers with Mod Ax1 and ADLs within room with Max A  Pt currently demonstrates impairments in the following areas: impaired balance, decreased activity tolerance/endurance, limited functional forward reach, LUE pronator drift, impaired LUE GMC/FMC, decreased AROM of LUE, decreased strength LUE > RUE, impaired cognition, impaired short term memory, decreased safety awareness/judgement, impaired problem solving  These impairments, as well as pt's pain, fall risk, multiple lines, home environment, home support, and medical status limit pt's ability to safely engage in all baseline areas of occupation, including grooming, bathing, dressing, toileting, functional mobility/transfers, house maintenance, medication management,  rest/sleep, social participation  Anticipate pt will continue to progress with continued participation in ADLs, maintaining safety, managing pain, and as he medically progresses  Recommend STR when medically stable to maximize independence, performance, and safety in ADLs  From OT standpoint, pt  will continue to benefit from continued OT services to address the following goals 3-5x/wk to  w/in 7-10 days  Goals   Patient Goals To eat his fruit    Plan   Treatment Interventions ADL retraining;Functional transfer training;UE strengthening/ROM; Endurance training;Cognitive reorientation;Patient/family training;Equipment evaluation/education; Neuromuscular reeducation; Fine motor coordination activities; Compensatory technique education;Continued evaluation; Energy conservation; Activityengagement   Goal Expiration Date 19   OT Frequency 3-5x/wk   Recommendation   OT Discharge Recommendation Short Term Rehab   Equipment Recommended Bedside commode   OT - OK to Discharge Yes  (to STR when medically stable )   Barthel Index   Feeding 5   Bathing 0   Grooming Score 0   Dressing Score 5   Bladder Score 5   Bowels Score 10   Toilet Use Score 5   Transfers (Bed/Chair) Score 5   Mobility (Level Surface) Score 0   Stairs Score 0   Barthel Index Score 35   Modified [place] Scale   Modified [place] Scale 4       Goals: 1  Pt will complete UB/LB ADLS with SBA  2  Pt will complete toileting, including thorough hygiene,with SBA  and use of DME PRN  3  Pt will complete bed mobility and transfer to EOB with SBA  to increase bed mobility in preparation for EOB activities  4  Pt will complete functional transfers on/off all surfaces, including toilet/commode, with SBA  to increase participation in ADLS/IADLS  5  Pt will complete light grooming task while standing at sink for 3-5 minutes with SBA  and good balance, DME PRN  6  Pt will increase activity tolerance to 30 minutes during OT tx session to increase endurance during all functional tasks  7  Pt will increase standing balance to good for 15 min while engaging in ADLS  8  Pt will complete simulated IADLs with supervision and less than 2 cues for safety  9  Pt will demonstrate 100% carryover of precautions s/p education  w/ mod I  t/o functional ADL/IADL/leisure tasks  10 Pt will be attentive 100% of the time during formal cognitive assessment (ie: ACLS) or ongoing functional cognitive assessment as needed in order to make safe discharge recommendations  11  Pt will complete ADLs/IADLs while following 100% of 2-3 step commands  12   Pt will be alert and oriented x4 100% of OT tx session  13 Pt will be attentive 100% of the time during depression screening/leisure checklist to promote psychosocial wellbeing      Toño Champion MS , OTR/L

## 2019-04-13 NOTE — CONSULTS
Consult - Podiatry   Christine Sam 70 y o  male MRN: [id number]  Unit/Bed#: [facility] 721-01 Encounter: [id number]    Assessment/Plan     Assessment:  1  Onychomycosis   2  Ambulatory dysfunction     Plan:  - Debrided x10 toenails with large nail nipper without any incidents   - Patient is stable from podiatric standpoint  Will sing off  Thanks for consultation    - Rest of care per primary service     History of Present Illness     HPI:  Christine Sam is a 70 y o  male who presents with elongated and thickened toenails  Patient denies pain or discomfort to the feet  Patient states he has difficultly applying socks and causes pain the shoes  The socks were wet and smelled like urine  Patient appears to wet himself  Denies any other complaints of N/V/F/SOB    Consults  Review of Systems   Constitutional: Negative  HENT: Negative  Eyes: Negative  Respiratory: Negative  Cardiovascular: Negative  Gastrointestinal: Negative  Musculoskeletal: Negative    Skin:elongated toenails    Neurological: Negative  Psych: negative  Historical Information   Past Medical History:   Diagnosis Date    Hypertension     MCI (mild cognitive impairment)      History reviewed  No pertinent surgical history  Social History   Social History     Substance and Sexual Activity   Alcohol Use Not Currently     Social History     Substance and Sexual Activity   Drug Use No     Social History     Tobacco Use   Smoking Status Never Smoker   Smokeless Tobacco Never Used     Family History: History reviewed  No pertinent family history      Meds/Allergies   Medications Prior to Admission   Medication    amLODIPine (NORVASC) 5 mg tablet     No Known Allergies    Objective   First Vitals:   Blood Pressure: (!) 176/95 (04/11/19 1618)  Pulse: 104 (04/11/19 1618)  Temperature: 99 1 °F (37 3 °C) (04/11/19 1618)  Temp Source: Oral (04/11/19 1618)  Respirations: 20 (04/11/19 1618)  Height: 5' 2" (157 5 cm) (04/12/19 1735)  Weight - Scale: 90 4 kg (199 lb 4 7 oz) (04/12/19 1735)  SpO2: 98 % (04/11/19 1618)    Current Vitals:   Blood Pressure: 165/93 (04/13/19 0828)  Pulse: 83 (04/13/19 0828)  Temperature: 98 6 °F (37 °C) (04/13/19 0828)  Temp Source: Oral (04/13/19 9935)  Respirations: 16 (04/13/19 0828)  Height: 5' 2" (157 5 cm) (04/12/19 1735)  Weight - Scale: 90 6 kg (199 lb 11 8 oz) (04/13/19 0600)  SpO2: 99 % (04/13/19 0828)        /93 (BP Location: Left arm)   Pulse 83   Temp 98 6 °F (37 °C) (Oral)   Resp 16   Ht 5' 2" (1 575 m)   Wt 90 6 kg (199 lb 11 8 oz)   SpO2 99%   BMI 36 53 kg/m²      General Appearance:    Alert, cooperative, no distress   Head:    Normocephalic, without obvious abnormality, atraumatic   Eyes:    PERRL, conjunctiva/corneas clear, EOM's intact        Nose:   Moist mucous membranes   Neck:   Supple, symmetrical, trachea midline   Back:     Symmetric   Lungs:     Respirations unlabored   Heart:    Regular rate and rhythm, S1 and S2 normal, no murmur, rub   or gallop   Abdomen:     Soft, non-tender   Extremities:   MMT 5/5  ROM b/l lower extremity with in normal limits  Pulses:   Palpable pedal pulses b/l  Skin:   Elongated and thickended toenails with subungual debris noted  There is discoloration noted to the few of the nails from fungus  No open wounds noted  Neurologic:   Gross sensation is intact  Protective sensation is intact             Lab Results:   Admission on 04/11/2019   Component Date Value    WBC 04/11/2019 13 35*    RBC 04/11/2019 4 78     Hemoglobin 04/11/2019 14 0     Hematocrit 04/11/2019 43 7     MCV 04/11/2019 91     MCH 04/11/2019 29 3     MCHC 04/11/2019 32 0     RDW 04/11/2019 14 3     MPV 04/11/2019 10 7     Platelets 77/79/7842 314     nRBC 04/11/2019 0     Neutrophils Relative 04/11/2019 84*    Immat GRANS % 04/11/2019 0     Lymphocytes Relative 04/11/2019 9*    Monocytes Relative 04/11/2019 7     Eosinophils Relative 04/11/2019 0     Basophils Relative 04/11/2019 0     Neutrophils Absolute 04/11/2019 11 14*    Immature Grans Absolute 04/11/2019 0 05     Lymphocytes Absolute 04/11/2019 1 16     Monocytes Absolute 04/11/2019 0 95     Eosinophils Absolute 04/11/2019 0 00     Basophils Absolute 04/11/2019 0 05     Sodium 04/11/2019 142     Potassium 04/11/2019 3 6     Chloride 04/11/2019 109*    CO2 04/11/2019 23     ANION GAP 04/11/2019 10     BUN 04/11/2019 10     Creatinine 04/11/2019 1 18     Glucose 04/11/2019 107     Calcium 04/11/2019 9 0     AST 04/11/2019 14     ALT 04/11/2019 16     Alkaline Phosphatase 04/11/2019 113     Total Protein 04/11/2019 7 5     Albumin 04/11/2019 4 1     Total Bilirubin 04/11/2019 0 47     eGFR 04/11/2019 63     Troponin I 04/11/2019 <0 02     Protime 04/11/2019 14 3*    INR 04/11/2019 1 10     TSH 3RD GENERATON 04/11/2019 2 630     Cholesterol 04/12/2019 182     Triglycerides 04/12/2019 62     HDL, Direct 04/12/2019 52     LDL Calculated 04/12/2019 118*    Hemoglobin A1C 04/12/2019 6 2     EAG 04/12/2019 131     Sodium 04/12/2019 142     Potassium 04/12/2019 3 7     Chloride 04/12/2019 110*    CO2 04/12/2019 25     ANION GAP 04/12/2019 7     BUN 04/12/2019 11     Creatinine 04/12/2019 0 98     Glucose 04/12/2019 93     Glucose, Fasting 04/12/2019 93     Calcium 04/12/2019 8 4     eGFR 04/12/2019 78     WBC 04/12/2019 9 26     RBC 04/12/2019 4 68     Hemoglobin 04/12/2019 13 8     Hematocrit 04/12/2019 42 8     MCV 04/12/2019 92     MCH 04/12/2019 29 5     MCHC 04/12/2019 32 2     RDW 04/12/2019 14 4     Platelets 51/57/7468 307     MPV 04/12/2019 10 4     Vitamin B-12 04/12/2019 243     Folate 04/12/2019 >20 0*    Ventricular Rate 04/11/2019 111     Atrial Rate 04/11/2019 111     MS Interval 04/11/2019 176     QRSD Interval 04/11/2019 80     QT Interval 04/11/2019 322     QTC Interval 04/11/2019 437     P Axis 04/11/2019 56     QRS Axis 04/11/2019 -2  T Wave Axis 04/11/2019 44     WBC 04/13/2019 6 83     RBC 04/13/2019 4 90     Hemoglobin 04/13/2019 14 5     Hematocrit 04/13/2019 47 2     MCV 04/13/2019 96     MCH 04/13/2019 29 6     MCHC 04/13/2019 30 7*    RDW 04/13/2019 14 5     MPV 04/13/2019 11 1     Platelets 32/98/2987 247     nRBC 04/13/2019 0     Neutrophils Relative 04/13/2019 65     Immat GRANS % 04/13/2019 0     Lymphocytes Relative 04/13/2019 21     Monocytes Relative 04/13/2019 12     Eosinophils Relative 04/13/2019 1     Basophils Relative 04/13/2019 1     Neutrophils Absolute 04/13/2019 4 48     Immature Grans Absolute 04/13/2019 0 01     Lymphocytes Absolute 04/13/2019 1 43     Monocytes Absolute 04/13/2019 0 79     Eosinophils Absolute 04/13/2019 0 07     Basophils Absolute 04/13/2019 0 05     Sodium 04/13/2019 139     Potassium 04/13/2019 3 9     Chloride 04/13/2019 111*    CO2 04/13/2019 22     ANION GAP 04/13/2019 6     BUN 04/13/2019 16     Creatinine 04/13/2019 0 96     Glucose 04/13/2019 93     Calcium 04/13/2019 8 8     eGFR 04/13/2019 80                    Invalid input(s): LABAEARO            Imaging: I have personally reviewed pertinent films in PACS  EKG, Pathology, and Other Studies: I have personally reviewed pertinent reports        Code Status: Level 1 - Full Code  Advance Directive and Living Will:      Power of :    POLST:

## 2019-04-13 NOTE — PLAN OF CARE
Problem: PHYSICAL THERAPY ADULT  Goal: Performs mobility at highest level of function for planned discharge setting  See evaluation for individualized goals  Description  Treatment/Interventions: Functional transfer training, LE strengthening/ROM, Elevations, Therapeutic exercise, Endurance training, Bed mobility, Gait training, Patient/family training, Equipment eval/education  Equipment Recommended: (TBD)       See flowsheet documentation for full assessment, interventions and recommendations  Note:   Prognosis: Fair  Problem List: Decreased strength, Decreased endurance, Impaired balance, Decreased coordination, Decreased mobility, Impaired tone  Assessment: Pt seen for high complexity physical therapy evaluation  Pt is a 71 y/o male w/ history/comorbidities of mild cognitive impairment, HTN now admitted w/ L sided weakness- found by observers, slumped against a pole- sent to ED, and MRI brain showed acute infarcts R thalamus and internal capsule  Due to acute medical issues w/ L sided weakness, pain, fall risk, and impulsivity, note unstable clinical picture  PT consutled to assess mobility, d c needs  Pt presents w  decreased functional mob, sitting snd standing balance, endurance, L > R sided strength w/ l tone changes, barriers at home  will benefit from skilled PT to correct for the above problems  Recommend rehab at d/c        Recommendation: (recommend rehab at d/c)     PT - OK to Discharge: (S) Yes(when stable to rehab)    See flowsheet documentation for full assessment

## 2019-04-13 NOTE — PLAN OF CARE
Problem: OCCUPATIONAL THERAPY ADULT  Goal: Performs self-care activities at highest level of function for planned discharge setting  See evaluation for individualized goals  Description  Treatment Interventions: ADL retraining, Functional transfer training, UE strengthening/ROM, Endurance training, Cognitive reorientation, Patient/family training, Equipment evaluation/education, Neuromuscular reeducation, Fine motor coordination activities, Compensatory technique education, Continued evaluation, Energy conservation, Activityengagement  Equipment Recommended: Bedside commode       See flowsheet documentation for full assessment, interventions and recommendations  Note:   Limitation: Decreased ADL status, Decreased UE ROM, Decreased UE strength, Decreased Safe judgement during ADL, Decreased cognition, Decreased endurance, Decreased self-care trans, Decreased high-level ADLs, Decreased fine motor control  Prognosis: Good  Assessment: Pt is a 70 y o  male who was admitted to [facility] on 4/11/19  With mild cognitive impairment, left knee pain, and HTN  Pt was slumped over against telephone pole as reported by bystanders  Pt's dx/ include: left sided weakness,  Ambulatory dysfunction, NSTEMI- type 2, morbid obesity  Pt PMH includes:HTN, MCI  Xray of L knee was negative for acute abnormality  CTA head and neck obtained and without acute hemorrhage, revealed old lacunar infarcts  t baseline pt was completing all areas of occupation independently and used [facility] for functional mobility     Pt lives with alone in a 1 floor apartment with 40 + [address]ICA, pt reports no support outside of his   Pt currently presents with impaired cognition, fall risk, decreased ADL status, pain, multiple lines, bed/chair alarm, telemetry  Currently pt completes functional mobility/transfers with Mod Ax1 and ADLs within room with Max A  Pt currently demonstrates impairments in the following areas: impaired balance, decreased activity tolerance/endurance, limited functional forward reach, LUE pronator drift, impaired LUE GMC/FMC, decreased AROM of LUE, decreased strength LUE > RUE, impaired cognition, impaired short term memory, decreased safety awareness/judgement, impaired problem solving  These impairments, as well as pt's pain, fall risk, multiple lines, home environment, home support, and medical status limit pt's ability to safely engage in all baseline areas of occupation, including grooming, bathing, dressing, toileting, functional mobility/transfers, house maintenance, medication management,  rest/sleep, social participation  Anticipate pt will continue to progress with continued participation in ADLs, maintaining safety, managing pain, and as he medically progresses  Recommend STR when medically stable to maximize independence, performance, and safety in ADLs  From OT standpoint, pt  will continue to benefit from continued OT services to address the following goals 3-5x/wk to  w/in 7-10 days        OT Discharge Recommendation: Short Term Rehab  OT - OK to Discharge: Yes(to STR when medically stable )

## 2019-04-14 PROBLEM — I63.81 BASAL GANGLIA INFARCTION (HCC): Status: ACTIVE | Noted: 2019-04-14

## 2019-04-14 PROBLEM — I63.81 THALAMIC INFARCT, ACUTE (HCC): Status: ACTIVE | Noted: 2019-04-11

## 2019-04-14 PROBLEM — I63.9 THALAMIC INFARCT, ACUTE (HCC): Status: ACTIVE | Noted: 2019-04-11

## 2019-04-14 PROBLEM — I63.9 BASAL GANGLIA INFARCTION (HCC): Status: ACTIVE | Noted: 2019-04-14

## 2019-04-14 LAB
ANION GAP SERPL CALCULATED.3IONS-SCNC: 6 MMOL/L (ref 4–13)
BASOPHILS # BLD AUTO: 0.07 THOUSANDS/ΜL (ref 0–0.1)
BASOPHILS NFR BLD AUTO: 1 % (ref 0–1)
BUN SERPL-MCNC: 15 MG/DL (ref 5–25)
CALCIUM SERPL-MCNC: 8.7 MG/DL (ref 8.3–10.1)
CHLORIDE SERPL-SCNC: 114 MMOL/L (ref 100–108)
CO2 SERPL-SCNC: 23 MMOL/L (ref 21–32)
CREAT SERPL-MCNC: 0.97 MG/DL (ref 0.6–1.3)
EOSINOPHIL # BLD AUTO: 0.19 THOUSAND/ΜL (ref 0–0.61)
EOSINOPHIL NFR BLD AUTO: 2 % (ref 0–6)
ERYTHROCYTE [DISTWIDTH] IN BLOOD BY AUTOMATED COUNT: 14.3 % (ref 11.6–15.1)
GFR SERPL CREATININE-BSD FRML MDRD: 79 ML/MIN/1.73SQ M
GLUCOSE SERPL-MCNC: 107 MG/DL (ref 65–140)
HCT VFR BLD AUTO: 44.9 % (ref 36.5–49.3)
HGB BLD-MCNC: 14.4 G/DL (ref 12–17)
IMM GRANULOCYTES # BLD AUTO: 0.03 THOUSAND/UL (ref 0–0.2)
IMM GRANULOCYTES NFR BLD AUTO: 0 % (ref 0–2)
LYMPHOCYTES # BLD AUTO: 2.09 THOUSANDS/ΜL (ref 0.6–4.47)
LYMPHOCYTES NFR BLD AUTO: 25 % (ref 14–44)
MCH RBC QN AUTO: 29.4 PG (ref 26.8–34.3)
MCHC RBC AUTO-ENTMCNC: 32.1 G/DL (ref 31.4–37.4)
MCV RBC AUTO: 92 FL (ref 82–98)
MONOCYTES # BLD AUTO: 1.07 THOUSAND/ΜL (ref 0.17–1.22)
MONOCYTES NFR BLD AUTO: 13 % (ref 4–12)
NEUTROPHILS # BLD AUTO: 4.85 THOUSANDS/ΜL (ref 1.85–7.62)
NEUTS SEG NFR BLD AUTO: 59 % (ref 43–75)
NRBC BLD AUTO-RTO: 0 /100 WBCS
PLATELET # BLD AUTO: 302 THOUSANDS/UL (ref 149–390)
PMV BLD AUTO: 10.6 FL (ref 8.9–12.7)
POTASSIUM SERPL-SCNC: 4 MMOL/L (ref 3.5–5.3)
RBC # BLD AUTO: 4.89 MILLION/UL (ref 3.88–5.62)
SODIUM SERPL-SCNC: 143 MMOL/L (ref 136–145)
WBC # BLD AUTO: 8.3 THOUSAND/UL (ref 4.31–10.16)

## 2019-04-14 PROCEDURE — 80048 BASIC METABOLIC PNL TOTAL CA: CPT | Performed by: INTERNAL MEDICINE

## 2019-04-14 PROCEDURE — 85025 COMPLETE CBC W/AUTO DIFF WBC: CPT | Performed by: INTERNAL MEDICINE

## 2019-04-14 PROCEDURE — 99232 SBSQ HOSP IP/OBS MODERATE 35: CPT | Performed by: INTERNAL MEDICINE

## 2019-04-14 RX ADMIN — HEPARIN SODIUM 5000 UNITS: 5000 INJECTION INTRAVENOUS; SUBCUTANEOUS at 06:02

## 2019-04-14 RX ADMIN — CLOPIDOGREL BISULFATE 75 MG: 75 TABLET ORAL at 08:49

## 2019-04-14 RX ADMIN — ATORVASTATIN CALCIUM 80 MG: 80 TABLET, FILM COATED ORAL at 16:07

## 2019-04-14 RX ADMIN — AMLODIPINE BESYLATE 5 MG: 5 TABLET ORAL at 08:49

## 2019-04-14 RX ADMIN — HEPARIN SODIUM 5000 UNITS: 5000 INJECTION INTRAVENOUS; SUBCUTANEOUS at 21:58

## 2019-04-14 RX ADMIN — HEPARIN SODIUM 5000 UNITS: 5000 INJECTION INTRAVENOUS; SUBCUTANEOUS at 15:59

## 2019-04-14 RX ADMIN — ASPIRIN 81 MG 81 MG: 81 TABLET ORAL at 08:49

## 2019-04-14 NOTE — SOCIAL WORK
Met with pt and explained Cm role  Pt lives alone in a 2nd floor apt with [address]  Pt was independent PTA and does not drive  Pt's friend provides him with transportation  Pt does not have a PCP  [employer] card provided  Pt has a walker and cane at home  No hx of HHC, but has a hx of rehab at [facility]  No hx of mental health issues or drug or alcohol use  Pt has a prescription plan and uses [facility] on [address] in [place] for his prescriptions  Primary contact is pt's friend Antwan Crenshaw, contact # [phone]  Pt's friend will provide pt with transportation home upon discharge  Pt does not have a POA  A post acute care recommendation was made by your care team for STR  Discussed Freedom of Choice with patient  List of facilities given to patient via in person  patient aware the list is custom filtered for them by zip code location and that [facility] post acute providers are designated  Informed pt of therapy's recommendation for inpt rehab  Pt stated that he would prefer a referral to [facility] as 1st choice and 2nd choice [facility] referral sent for same

## 2019-04-14 NOTE — PLAN OF CARE
Problem: DISCHARGE PLANNING - CARE MANAGEMENT  Goal: Discharge to post-acute care or home with appropriate resources  Description  INTERVENTIONS:  - Conduct assessment to determine patient/family and health care team treatment goals, and need for post-acute services based on payer coverage, community resources, and patient preferences, and barriers to discharge  - Address psychosocial, clinical, and financial barriers to discharge as identified in assessment in conjunction with the patient/family and health care team  - Arrange appropriate level of post-acute services according to patient's   needs and preference and payer coverage in collaboration with the physician and health care team  - Communicate with and update the patient/family, physician, and health care team regarding progress on the discharge plan  - Arrange appropriate transportation to post-acute venues  - Pt will be discharged to inpt rehab   Outcome: Progressing

## 2019-04-14 NOTE — PROGRESS NOTES
IM Residency Progress Note   Unit/Bed#: [facility] 721-01 Encounter: [id number]  SOD Team A      Connie Whitaker 70 y o  male [address] Stay Days: 2      Assessment/Plan:    Principal Problem:    Thalamic infarct, acute ([facility] [address] )  Active Problems:    Ambulatory dysfunction    Falls frequently    Prediabetes    HTN (hypertension)    Mild cognitive impairment    Morbid obesity with BMI of 45 0-49 9, adult (HCC)    Knee pain    Multiple wounds of skin    Basal ganglia infarction ([facility] [address] )    Acute right thalamic/basal ganglia infarct  -initially presenting with left facial droop, and left-sided weakness  unknown last well time; not a candidate for tPA  Loaded with Plavix on presentation  -infarcts noted on MRI  -neurology consulted and following; appreciate recommendations  [facility]  -continue dual anti-platelet therapy with aspirin and Plavix for 1 month  -continue Lipitor  -continue routine neuro checks  -fall precautions/up with assistance  -speech following; dysphagia level 3 diet with thin liquids  -PT OT consulted and following; recommending short-term rehab    Ambulatory dysfunction  -secondary to chronic knee pain and thalamic/basal ganglia infarcts  -uses cane at baseline  -PT/OT  -fall precautions  -up with assistance    Pre diabetes  -A1c 6 2%  -continue monitor/follow-up outpatient  -advised diet control    Hypertension  -amlodipine started yesterday  -continue amlodipine 5 mg daily; consider up titrating or adding additional antihypertensive medication if blood pressures stay elevated past today    Chronic knee pain  -stable  -Tylenol p r n  Disposition:  Placement pending      Subjective:   Patient seen examined this morning  Patient resting comfortably in no acute distress  No acute events overnight   Denies fever, chills, headaches, lightheadedness, dizziness, visual disturbances, sore throat, chest pain, palpitations, SOB, abdominal pain, nausea, vomiting, or trouble urinating/ defecating  Vitals: Temp (24hrs), Av 5 °F (36 9 °C), Min:98 1 °F (36 7 °C), Max:99 °F (37 2 °C)  Current: Temperature: 98 3 °F (36 8 °C)  Vitals:    19 1721 19 2235 19 0600 19 0800   BP: 160/90 143/90  145/99   BP Location: Left arm Right arm  Right arm   Pulse: 76 66  77   Resp: 16 18  18   Temp: 98 1 °F (36 7 °C) 99 °F (37 2 °C)  98 3 °F (36 8 °C)   TempSrc: Oral Oral  Oral   SpO2: 96% 93%  95%   Weight:   93 5 kg (206 lb 2 1 oz)    Height:        Body mass index is 37 7 kg/m²  I/O last 24 hours: In: 5 [P O :540]  Out: 370 [Urine:370]      Physical Exam   Constitutional: He is oriented to person, place, and time  He appears well-developed and well-nourished  No distress  HENT:   Head: Normocephalic and atraumatic  Right Ear: External ear normal    Left Ear: External ear normal    Nose: Nose normal    Eyes: Pupils are equal, round, and reactive to light  Conjunctivae and EOM are normal  Right eye exhibits no discharge  Left eye exhibits no discharge  No scleral icterus  Neck: No JVD present  No tracheal deviation present  Cardiovascular: Normal rate, regular rhythm, normal heart sounds and intact distal pulses  Exam reveals no gallop and no friction rub  No murmur heard  Pulmonary/Chest: Effort normal and breath sounds normal  No stridor  No respiratory distress  He has no wheezes  He has no rales  He exhibits no tenderness  Abdominal: Soft  Bowel sounds are normal  He exhibits no distension  There is no tenderness  There is no rebound and no guarding  Musculoskeletal: He exhibits no edema, tenderness or deformity  Neurological: He is alert and oriented to person, place, and time  A cranial nerve deficit is present  No sensory deficit  He exhibits abnormal muscle tone  Coordination abnormal    Cranial nerves 2-12 grossly intact with exception of cranial nerve 7 (left facial droop)    4/5 strength in left upper extremity and 4/5  strength in left upper extremity  4/5 strength in left lower extremity  Sensations grossly intact  5/5 strength in right upper extremity and right lower extremity   Skin: Skin is warm  He is not diaphoretic  Vitals reviewed  Invasive Devices     Peripheral Intravenous Line            Peripheral IV 04/11/19 Right Antecubital 2 days                          Labs:   Recent Results (from the past 24 hour(s))   CBC and differential    Collection Time: 04/14/19  6:02 AM   Result Value Ref Range    WBC 8 30 4 31 - 10 16 Thousand/uL    RBC 4 89 3 88 - 5 62 Million/uL    Hemoglobin 14 4 12 0 - 17 0 g/dL    Hematocrit 44 9 36 5 - 49 3 %    MCV 92 82 - 98 fL    MCH 29 4 26 8 - 34 3 pg    MCHC 32 1 31 4 - 37 4 g/dL    RDW 14 3 11 6 - 15 1 %    MPV 10 6 8 9 - 12 7 fL    Platelets 150 577 - 444 Thousands/uL    nRBC 0 /100 WBCs    Neutrophils Relative 59 43 - 75 %    Immat GRANS % 0 0 - 2 %    Lymphocytes Relative 25 14 - 44 %    Monocytes Relative 13 (H) 4 - 12 %    Eosinophils Relative 2 0 - 6 %    Basophils Relative 1 0 - 1 %    Neutrophils Absolute 4 85 1 85 - 7 62 Thousands/µL    Immature Grans Absolute 0 03 0 00 - 0 20 Thousand/uL    Lymphocytes Absolute 2 09 0 60 - 4 47 Thousands/µL    Monocytes Absolute 1 07 0 17 - 1 22 Thousand/µL    Eosinophils Absolute 0 19 0 00 - 0 61 Thousand/µL    Basophils Absolute 0 07 0 00 - 0 10 Thousands/µL   Basic metabolic panel    Collection Time: 04/14/19  6:02 AM   Result Value Ref Range    Sodium 143 136 - 145 mmol/L    Potassium 4 0 3 5 - 5 3 mmol/L    Chloride 114 (H) 100 - 108 mmol/L    CO2 23 21 - 32 mmol/L    ANION GAP 6 4 - 13 mmol/L    BUN 15 5 - 25 mg/dL    Creatinine 0 97 0 60 - 1 30 mg/dL    Glucose 107 65 - 140 mg/dL    Calcium 8 7 8 3 - 10 1 mg/dL    eGFR 79 ml/min/1 73sq m       Radiology Results: I have personally reviewed pertinent reports  Other Diagnostic Testing:   I have personally reviewed pertinent reports          Active Meds:   Current Facility-Administered Medications   Medication Dose Route Frequency    acetaminophen (TYLENOL) tablet 650 mg  650 mg Oral Q4H PRN    amLODIPine (NORVASC) tablet 5 mg  5 mg Oral Daily    aspirin chewable tablet 81 mg  81 mg Oral Daily    atorvastatin (LIPITOR) tablet 80 mg  80 mg Oral Daily With Dinner    clopidogrel (PLAVIX) tablet 75 mg  75 mg Oral Daily    heparin (porcine) subcutaneous injection 5,000 Units  5,000 Units Subcutaneous Q8H [facility]         VTE Pharmacologic Prophylaxis: Heparin  VTE Mechanical Prophylaxis: sequential compression device    Ashly Abreu DO

## 2019-04-15 VITALS
HEART RATE: 93 BPM | OXYGEN SATURATION: 95 % | SYSTOLIC BLOOD PRESSURE: 158 MMHG | BODY MASS INDEX: 37.93 KG/M2 | DIASTOLIC BLOOD PRESSURE: 100 MMHG | TEMPERATURE: 98.3 F | WEIGHT: 206.13 LBS | RESPIRATION RATE: 20 BRPM | HEIGHT: 62 IN

## 2019-04-15 PROCEDURE — 97530 THERAPEUTIC ACTIVITIES: CPT

## 2019-04-15 PROCEDURE — 97535 SELF CARE MNGMENT TRAINING: CPT

## 2019-04-15 PROCEDURE — 99238 HOSP IP/OBS DSCHRG MGMT 30/<: CPT | Performed by: INTERNAL MEDICINE

## 2019-04-15 RX ORDER — AMLODIPINE BESYLATE 5 MG/1
5 TABLET ORAL DAILY
Qty: 60 TABLET | Refills: 0 | Status: CANCELLED | OUTPATIENT
Start: 2019-04-15

## 2019-04-15 RX ORDER — CLOPIDOGREL BISULFATE 75 MG/1
75 TABLET ORAL DAILY
Qty: 28 TABLET | Refills: 0 | Status: SHIPPED | OUTPATIENT
Start: 2019-04-16 | End: 2019-04-15

## 2019-04-15 RX ORDER — CLOPIDOGREL BISULFATE 75 MG/1
75 TABLET ORAL DAILY
Qty: 28 TABLET | Refills: 0 | Status: SHIPPED | OUTPATIENT
Start: 2019-04-16 | End: 2020-12-11 | Stop reason: ALTCHOICE

## 2019-04-15 RX ORDER — ASPIRIN 81 MG/1
81 TABLET, CHEWABLE ORAL DAILY
Qty: 60 TABLET | Refills: 0 | Status: SHIPPED | OUTPATIENT
Start: 2019-04-16 | End: 2019-04-15

## 2019-04-15 RX ORDER — ASPIRIN 81 MG/1
81 TABLET, CHEWABLE ORAL DAILY
Qty: 60 TABLET | Refills: 0 | Status: SHIPPED | OUTPATIENT
Start: 2019-04-16

## 2019-04-15 RX ORDER — ATORVASTATIN CALCIUM 80 MG/1
80 TABLET, FILM COATED ORAL
Qty: 60 TABLET | Refills: 0 | Status: SHIPPED | OUTPATIENT
Start: 2019-04-15

## 2019-04-15 RX ORDER — AMLODIPINE BESYLATE 5 MG/1
5 TABLET ORAL DAILY
Qty: 30 TABLET | Refills: 0 | Status: SHIPPED | OUTPATIENT
Start: 2019-04-15

## 2019-04-15 RX ORDER — ATORVASTATIN CALCIUM 80 MG/1
80 TABLET, FILM COATED ORAL
Qty: 60 TABLET | Refills: 0 | Status: SHIPPED | OUTPATIENT
Start: 2019-04-15 | End: 2019-04-15

## 2019-04-15 RX ADMIN — HEPARIN SODIUM 5000 UNITS: 5000 INJECTION INTRAVENOUS; SUBCUTANEOUS at 13:11

## 2019-04-15 RX ADMIN — HEPARIN SODIUM 5000 UNITS: 5000 INJECTION INTRAVENOUS; SUBCUTANEOUS at 05:25

## 2019-04-15 RX ADMIN — AMLODIPINE BESYLATE 5 MG: 5 TABLET ORAL at 09:27

## 2019-04-15 RX ADMIN — ASPIRIN 81 MG 81 MG: 81 TABLET ORAL at 09:27

## 2019-04-15 RX ADMIN — ATORVASTATIN CALCIUM 80 MG: 80 TABLET, FILM COATED ORAL at 16:11

## 2019-04-15 RX ADMIN — CLOPIDOGREL BISULFATE 75 MG: 75 TABLET ORAL at 09:27

## 2019-04-15 NOTE — PLAN OF CARE
Problem: PHYSICAL THERAPY ADULT  Goal: Performs mobility at highest level of function for planned discharge setting  See evaluation for individualized goals  Description  Treatment/Interventions: Functional transfer training, LE strengthening/ROM, Elevations, Therapeutic exercise, Endurance training, Bed mobility, Gait training, Patient/family training, Equipment eval/education  Equipment Recommended: (TBD)       See flowsheet documentation for full assessment, interventions and recommendations  Note:   Prognosis: Good  Problem List: Decreased strength, Decreased endurance, Impaired balance, Decreased mobility, Decreased coordination, Decreased cognition, Impaired judgement, Decreased safety awareness  Assessment: Pt seen for session for PT 1;1 activity for setup, bed mob, few min at EOB, transfers/standing trial and repositioning  Pt cooperative  continue to note L sided weakenss w  all mobility tasks  Zac in standing and needs cues for wt shifting to clear w/ limited gait  continue to recommend rehab at dc        Recommendation: (recommend rehab at d/c)     PT - OK to Discharge: (S) Yes(when stable to rehab)    See flowsheet documentation for full assessment

## 2019-04-15 NOTE — PHYSICAL THERAPY NOTE
Physical Therapy Treatment Note       04/15/19 0955   Pain Assessment   Pain Assessment 0-10   Pain Score 3   Pain Type Chronic pain   Pain Location Knee   Pain Orientation Bilateral   Patient's Stated Pain Goal No pain   Hospital Pain Intervention(s) Ambulation/increased activity;Repositioned   Response to Interventions unchanged   Restrictions/Precautions   Weight Bearing Precautions Per Order No   Other Precautions Cognitive; Chair Alarm; Bed Alarm;Multiple lines; Fall Risk;Pain   General   Chart Reviewed Yes   Family/Caregiver Present No   Cognition   Overall Cognitive Status Impaired   Arousal/Participation Responsive   Attention Attends with cues to redirect   Memory Unable to assess   Following Commands Follows one step commands inconsistently   Subjective   Subjective states she feels OK  continues to note pain and weakness   Bed Mobility   Supine to Sit 3  Moderate assistance   Additional items Assist x 1   Transfers   Sit to Stand 3  Moderate assistance   Additional items Assist x 2   Stand to Sit 3  Moderate assistance   Additional items Assist x 2   Stand pivot 3  Moderate assistance   Additional items Assist x 2   Ambulation/Elevation   Gait pattern   (slow, ataxia, L foot drag, L kene kimberly)   Gait Assistance 3  Moderate assist   Additional items Assist x 2   Assistive Device   (HHA of 2)   Distance 2-'3x1 from bed to chair   Balance   Static Sitting Fair   Dynamic Sitting Poor +   Static Standing Poor   Dynamic Standing Poor -   Ambulatory Poor -   Endurance Deficit   Endurance Deficit Yes   Endurance Deficit Description fatigue, weakness   Activity Tolerance   Activity Tolerance Patient limited by fatigue;Treatment limited secondary to medical complications (Comment)   Nurse Made Aware yes   Assessment   Prognosis Good   Problem List Decreased strength;Decreased endurance; Impaired balance;Decreased mobility; Decreased coordination;Decreased cognition; Impaired judgement;Decreased safety awareness Assessment Pt seen for session for PT 1;1 activity for setup, bed mob, few min at EOB, transfers/standing trial and repositioning  Pt cooperative  continue to note L sided weakenss w  all mobility tasks  Zac in standing and needs cues for wt shifting to clear w/ limited gait    continue to recommend rehab at dc   Goals   Patient Goals to get better and be able to return home   STG Expiration Date 04/27/19   Treatment Day 1   Plan   Treatment/Interventions   (recommend rehab at d/c)   Progress Progressing toward goals   PT Frequency   (4-6x/wk)   Recommendation   Recommendation   (recommend rehab at d/c)   PT - OK to Discharge Yes  (when stable to rehab)   Lul Padilla PT, DPT CSRS

## 2019-04-15 NOTE — PROGRESS NOTES
Attempted to meet with patient to discuss follow up care with outpatient neurology  Patient unavailable at this time  Will follow up

## 2019-04-15 NOTE — SOCIAL WORK
Pt cleared for d/c to [facility], [facility]  CM made Pt and emergency contact Vianney Kim aware of the financial responsibility for the wheelchair transport  CM completed facility transfer and cmn form  CM notified Pt, his emergency contact Vianney Kim RN, SOD-A and facility Patrica of d/c arrangements  D/c lounge availability reviewed with Pt, GRACE Bianchi and charge RN

## 2019-04-15 NOTE — PROGRESS NOTES
IM Residency Progress Note   Unit/Bed#: [facility] 721-01 Encounter: [id number]  SOD Team A      Sandi Thornton 70 y o  male [address] Stay Days: 3      Assessment/Plan:    Principal Problem:    Thalamic infarct, acute ([facility] )  Active Problems:    Ambulatory dysfunction    Falls frequently    Prediabetes    HTN (hypertension)    Mild cognitive impairment    Morbid obesity with BMI of 45 0-49 9, adult (HCC)    Knee pain    Multiple wounds of skin    Basal ganglia infarction ([facility] )    Acute right thalamic/basal ganglia infarct  -initially presenting with left facial droop, and left-sided weakness  unknown last well time; not a candidate for tPA  Loaded with Plavix on presentation  -infarcts noted on MRI  -neurology consulted and following; appreciate recommendations  Will require follow up at Outpatient Stroke Clinic  -continue dual anti-platelet therapy with aspirin and Plavix for 1 month  -continue Lipitor  -continue routine neuro checks  -fall precautions/up with assistance  -speech following; dysphagia level 3 diet with thin liquids  -PT OT consulted and following; recommending short-term rehab     Ambulatory dysfunction  -secondary to chronic knee pain and thalamic/basal ganglia infarcts  -uses cane at baseline  -PT/OT  -fall precautions  -up with assistance     Pre diabetes  -A1c 6 2%  -continue monitor/follow-up outpatient  -advised diet control     Hypertension  -continue amlodipine 5 mg daily     Chronic knee pain  -stable  -Tylenol p r n          Disposition: Continue inpatient care and await placement into STR    Subjective:   Patient had an old wound on the left hand that he attributes to        Vitals: Temp (24hrs), Av 3 °F (36 8 °C), Min:98 2 °F (36 8 °C), Max:98 4 °F (36 9 °C)  Current: Temperature: 98 2 °F (36 8 °C)  Vitals:    19 0800 19 1532 19 2254 04/15/19 0715   BP: 145/99 159/93 149/92 141/83   BP Location: Right arm Left arm Left arm Left arm   Pulse: 77 62 70 64 Resp: 18 18 18 18   Temp: 98 3 °F (36 8 °C) 98 2 °F (36 8 °C) 98 4 °F (36 9 °C) 98 2 °F (36 8 °C)   TempSrc: Oral Oral Oral Oral   SpO2: 95% 95% 95% 96%   Weight:       Height:        Body mass index is 37 7 kg/m²  I/O last 24 hours: In: 1200 [P O :1200]  Out: 65 [Urine:660]      Physical Exam: Physical Exam:   GENERAL: NAD  HEENT:  NC/AT, MMM, no scleral icterus  CARDIAC:  RRR, +S1/S2, no S3/S4 heard, no m/g/r  PULMONARY:  CTA B/L, no wheezing/rales/rhonci, non-labored breathing  ABDOMEN:  Soft, NT/ND, +BS, no rebound/guarding/rigidity  Extremities:  2+ Pulses in DP/PT  No edema, cyanosis, or clubbing  NEUROLOGIC:  Alert/oriented x3, LUE- 4/5, LLE- 3/5 Right side Normal  SKIN:  No rashes or erythema    Invasive Devices     Peripheral Intravenous Line            Peripheral IV 04/15/19 Right Arm less than 1 day                          Labs: No results found for this or any previous visit (from the past 24 hour(s))  Radiology Results: I have personally reviewed pertinent reports  Other Diagnostic Testing:   I have personally reviewed pertinent reports          Active Meds:   Current Facility-Administered Medications   Medication Dose Route Frequency    acetaminophen (TYLENOL) tablet 650 mg  650 mg Oral Q4H PRN    amLODIPine (NORVASC) tablet 5 mg  5 mg Oral Daily    aspirin chewable tablet 81 mg  81 mg Oral Daily    atorvastatin (LIPITOR) tablet 80 mg  80 mg Oral Daily With Dinner    clopidogrel (PLAVIX) tablet 75 mg  75 mg Oral Daily    heparin (porcine) subcutaneous injection 5,000 Units  5,000 Units Subcutaneous Q8H [address]         VTE Pharmacologic Prophylaxis: Heparin  VTE Mechanical Prophylaxis: sequential compression device    Benito Whitney MD

## 2019-04-15 NOTE — OCCUPATIONAL THERAPY NOTE
OccupationalTherapy Progress Note     Patient Name: Tram Dove  HSMRR'X Date: 4/15/2019  Problem List  Patient Active Problem List   Diagnosis    Multiple injuries    Ambulatory dysfunction    Falls frequently    Prediabetes    Non-ST elevation myocardial infarction (NSTEMI), type 2    HTN (hypertension)    Mild cognitive impairment    Morbid obesity with BMI of 45 0-49 9, adult ([facility] )    Knee pain    Multiple wounds of skin    Thalamic infarct, acute ([facility] )    Basal ganglia infarction ([facility] )           04/15/19 1010   Restrictions/Precautions   Weight Bearing Precautions Per Order No   Lifestyle   Autonomy At baseline, pt was idnpendent in ADLs/IADLs, used [facility] for functional mob   Reciprocal Relationships Pt lives alone, reports no home support other then     Service to Others Pt is retired    Intrinsic Gratification Pt enjoys walking to the [employer]    Pain Assessment   Pain Assessment No/denies pain   Pain Score No Pain   Transfers   Sit to Stand 3  Moderate assistance   Additional items Assist x 1; Increased time required;Verbal cues   Stand to Sit 3  Moderate assistance   Additional items Assist x 1; Increased time required;Verbal cues   Stand pivot 3  Moderate assistance   Additional items Assist x 2; Increased time required;Verbal cues   Cognition   Overall Cognitive Status Impaired   Arousal/Participation Cooperative   Attention Attends with cues to redirect   Orientation Level Disoriented to person;Disoriented to place; Disoriented to time;Oriented to situation   Memory Decreased recall of precautions   Following Commands Follows one step commands with increased time or repetition   Cognition Assessment Tools MOCA   Score 3 8   Activity Tolerance   Activity Tolerance Patient tolerated treatment well   Medical Staff Made Aware PT Deepa Bailey present for transfer   Assessment   Assessment Patient participated in Skilled OT session this date with interventions consisting of  formal cognitive evaluations: ACLS-5*,  therapeutic activities to: increase activity tolerance, increase postural control, increase trunk control and increase OOB/ sitting tolerance   Pt scored 3 8/4 0 on the ACLS-5 indicating the need for 24 hour supervision, see further details below  Patient agreeable to OT treatment session, upon arrival patient was found supine in bed  In comparison to previous session, patient with improvements in activity tolerance*   Patient requiring verbal cues for safety for the transfer  Patient continues to be functioning below baseline level, occupational performance remains limited secondary to factors listed above and increased risk for falls and injury  From OT standpoint, recommendation at time of d/c would be Short Term Rehab  Patient to benefit from continued Occupational Therapy treatment while in the hospital to address deficits as defined above and maximize level of functional independence with ADLs and functional mobility  Plan   Treatment Interventions Functional transfer training; Endurance training;Cognitive reorientation;Continued evaluation   Goal Expiration Date 04/23/19   Treatment Day 1   OT Frequency 3-5x/wk   Recommendation   OT Discharge Recommendation Short Term Rehab   OT - OK to Discharge Yes  (to STR)   Barthel Index   Feeding 10   Bathing 0   Grooming Score 0   Dressing Score 5   Bladder Score 10   Bowels Score 10   Toilet Use Score 5   Transfers (Bed/Chair) Score 5   Mobility (Level Surface) Score 0   Stairs Score 0   Barthel Index Score 45   Modified Hanson Scale   Modified Hanson Scale 4     3 8    Administered Bradley Cognitive Level Screen (ACLS)  PT scored 3 8/6 0 indicating 24 house supervision is recommended to gather supplies needed for ADLs, to check results and to remove dangerous objects  Behavior:  May not ask for assistance  May abandon tasks  May be disoriented to day and date and will likely not retain information when told    Needs frequent redirection to tasks  Pace is slow & univariant  May insist on engaging in activity with potential harm (ie: driving)  Grooming:  Parry, brushes hair or shaves until all areas are covered  Recognizes completion of tasks  May attempt to style hair but may miss back of head  May use all available products  Recommend checking every few minutes if left alone  Dressing:  May learn to dress at a particular time of the day  Dons common articles of clothing slowly with some errors in sequence  Recognizes when dressing is completed  May stop when a problem occurs  May argue with caregiver suggestions to change selections  Bathing:  Covers all visible body parts with soap and rinses it off  May forget hidden parts or steps  Is aware of task completion  Expect waste if allowed to measure amounts  Do not leave unsupervised for more than a few minutes  Walking/Exercising:  Ambulates within familiar living area or short distances like the backyard  May be unable to retrace steps if lost   May run/walk until tired or stopped  Impulsive actions may result in falls  Eating:   May learn to present self at dining area at regular meal times or follow routine for passing dishes  Does not engage in social conversation at the table  Check food and beverage temperatures  Cannot follow dietary restrictions  Toileting:  May complete sequence of toileting with 1 or 2 steps left out  May forget to zip pants or wash hands  Medications: May be trained to take medications at a particular time of the day  May be able to tell when medications have been altered  Store medications in secure location away from the individual in child proof containers  Supervise to ensure compliance  Use of adaptive devices:  May need frequent redirection for safe carryover in use of assistive device  Do not expect to recall safety precautions  Housekeeping:  No awareness of need for housekeeping    May follow directed sequence of actions like putting away dishes  Do not expect effective results  Food Preparation:  Does not plan for food  May be trained not to eat from refrigerator or present self in dining area  May be able to make a simple sandwich with 1-2 ingredients  Watch for distractibility  Restrict access to sharp tools  Spending money:  May be trained to do the simple steps in a transaction with a fixed amount of money  Recognizes the completion of transaction  May be trained to ask for assistance  If given money, may give away or lose it  Shopping: Follows a guide to shopping areas  Looks into windows or at displays with no intent to purchase  May take items without paying  May fail to notice price or if they have enough money to pay  Do not leave the individual unsupervised in shopping areas  Laundry:   May fold all available items or drape all laundry on clothes line without redirection  Traveling: Able to sit in car for periods up to 1 hour  May express awareness of destination after arrival   Accompany individual on all trips  Telephoning:  May be trained to complete a simple telephone call involving a new number and procedure  May be unable to determine when to call  Does not  when emergency exists and may call 911 at inappropriate times  Driving:  Should not operate a motor vehicle        Evon Willis MS, OTR/L

## 2019-04-15 NOTE — PLAN OF CARE
Problem: OCCUPATIONAL THERAPY ADULT  Goal: Performs self-care activities at highest level of function for planned discharge setting  See evaluation for individualized goals  Description  Treatment Interventions: ADL retraining, Functional transfer training, UE strengthening/ROM, Endurance training, Cognitive reorientation, Patient/family training, Equipment evaluation/education, Neuromuscular reeducation, Fine motor coordination activities, Compensatory technique education, Continued evaluation, Energy conservation, Activityengagement  Equipment Recommended: Bedside commode       See flowsheet documentation for full assessment, interventions and recommendations  Outcome: Progressing  Note:   Limitation: Decreased ADL status, Decreased UE ROM, Decreased UE strength, Decreased Safe judgement during ADL, Decreased cognition, Decreased endurance, Decreased self-care trans, Decreased high-level ADLs, Decreased fine motor control  Prognosis: Good  Assessment: Patient participated in Skilled OT session this date with interventions consisting of  formal cognitive evaluations: ACLS-5*,  therapeutic activities to: increase activity tolerance, increase postural control, increase trunk control and increase OOB/ sitting tolerance   Pt scored 3 8/4 0 on the ACLS-5 indicating the need for 24 hour supervision, see further details below  Patient agreeable to OT treatment session, upon arrival patient was found supine in bed  In comparison to previous session, patient with improvements in activity tolerance*   Patient requiring verbal cues for safety for the transfer  Patient continues to be functioning below baseline level, occupational performance remains limited secondary to factors listed above and increased risk for falls and injury  From OT standpoint, recommendation at time of d/c would be Short Term Rehab     Patient to benefit from continued Occupational Therapy treatment while in the hospital to address deficits as defined above and maximize level of functional independence with ADLs and functional mobility        OT Discharge Recommendation: Short Term Rehab  OT - OK to Discharge: Yes(to STR)

## 2019-04-15 NOTE — DISCHARGE SUMMARY
SOD - DISCHARGE SUMMARY    Admitting Provider: Shanta Leonardo MD  Discharge Provider: No att  providers found  Primary Care Physician at Discharge: Patient needs to establish PCP     Discharge To: Clarice [facility]  Admission Date: 4/11/2019     Discharge Date: 4/15/2019  6:16 PM  Primary Discharge Diagnosis  Principal Problem:    Thalamic infarct, acute ([facility] )  Active Problems:    Ambulatory dysfunction    Falls frequently    Prediabetes    HTN (hypertension)    Mild cognitive impairment    Morbid obesity with BMI of 45 0-49 9, adult (HCC)    Knee pain    Multiple wounds of skin    Basal ganglia infarction ([facility] )  Resolved Problems:    GRIFFIN (acute kidney injury) [facility])      PATIENT INFORMATION      Patient: Ronal Michael 70 y o  male   MRN: [id number]  PCP: No primary care provider on file  Unit/Bed#: PPHP 721-01 Encounter: [id number]  Date Of Visit: 04/15/19  Current Length of Stay: 3 day(s)     ASSESSMENT & PLAN      Acute right thalamic/basal ganglia infarct  -Initially presenting with left facial droop, and left-sided weakness  unknown last well time; not a candidate for tPA  Loaded with Plavix on presentation  -infarcts noted on MRI  -neurology was following patient-  Will require follow up at Outpatient Stroke Clinic  -continue dual anti-platelet therapy with aspirin and Plavix for 1 month  -continue Lipitor  -fall precautions/up with assistance  -speech therapy consulted and recommended dysphagia level 3 diet with thin liquids  -PT OT recommended short term rehab     Ambulatory dysfunction  -secondary to chronic knee pain and thalamic/basal ganglia infarcts  -uses cane at baseline  -PT/OT  -fall precautions  -up with assistance     Pre diabetes  -A1c 6 2%  -continue monitor/follow-up outpatient  -advised diet control     Hypertension  -continue amlodipine 5 mg daily     Chronic knee pain  -stable  -Tylenol p r n          See previous progress note for further assessment and plans            DETAILS [facility] course     Patient is a 70year old male with a PMH of mild cognitive impairment and chronic left knee pain and hypertension  Patient does not follow with a doctor and takes no medications  He is a poor historian but he said he was experiencing left leg weakness but was unsure of the time when it started  When he was trying to cross the road on 04/12 he apparently fell and some people on the street helped him and then he decided to come to the ER  Upon presentation patient was noted to have left upper and lower extremity weakness and left sided facial droop  Because it was unsure when these deficits started he was outside the tPA window  A CT and CTA head and neck was negative however an MRI showed an acute infarct in the right thalamus and basal ganglia  Patient was started on aspirin and loaded with plavix by Neurology  He was also started on lipitor  His BP was high on presentation but the first 24 hrs we did not add an antihypertensive for permissive hypertension  He was then started on amlodipine 5mg which resulted in reduction of BP from the 160's-170's to the 130's-140's  Neurology was following the patient while in the hospital and recommended outpatient follow up in the stroke clinic  Patient's deficits improved while in the hospital  PT/OT also saw him and recommended Short term rehab  Patient was then transferred to [facility] for short term rehab on 04/15/19  Miscellaneous     Consulting Providers   Neurology  Therapeutic Operative Procedures Performed  None     Diagnostic Procedures Performed  Cta Head And Neck With And Without Contrast  MRI Brain  CT head    Result Date: 4/11/2019  Impression: No evidence of acute intracranial hemorrhage  No evidence of hemodynamic significant stenosis, aneurysm or dissection  Old lacunar infarcts  Nonspecific 1 3 cm right paratracheal lymph node   Workstation performed: [id number]     Xr Chest 2 Views    Result Date: 4/12/2019  Impression: No acute cardiopulmonary disease  Workstation performed: [id number]     Xr Knee 4+ Vw Left Injury    Result Date: 4/11/2019  Impression: No acute osseous abnormality  Workstation performed: [id number]     Mri Brain W Wo Contrast    Result Date: 4/12/2019  Impression: Acute right thalamus/basal ganglia infarcts I personally discussed this study  with Dr Remy Oreilly 4/12/2019 at 8:11 AM  Workstation performed: [id number]       Discharged With Lines: No  Test Results Pending at Discharge: None    Outpatient Follow-Up  Stroke clinic    Code Status: Level 1 - Full Code    Medications   See after visit summary for reconciled discharge medications provided to patient and family  Allergies  No Known Allergies  Discharge Diet: regular diet  Activity restrictions: none    Vitals:    04/15/19 1521   BP: 158/100   Pulse: 93   Resp: 20   Temp: 98 3 °F (36 8 °C)   SpO2: 95%       Discharge Condition: Stable      Discharge  Statement   I spent 30 minutes minutes discharging the patient  This time was spent on the day of discharge  I had direct contact with the patient on the day of discharge  Additional documentation is required if more than 30 minutes were spent on discharge       Dayron Ruby MD  Resident physician,  Internal Medicine

## 2019-04-15 NOTE — PLAN OF CARE
Problem: Potential for Falls  Goal: Patient will remain free of falls  Description  INTERVENTIONS:  - Assess patient frequently for physical needs  -  Identify cognitive and physical deficits and behaviors that affect risk of falls  -  [place] fall precautions as indicated by assessment   - Educate patient/family on patient safety including physical limitations  - Instruct patient to call for assistance with activity based on assessment  - Modify environment to reduce risk of injury  - Consider OT/PT consult to assist with strengthening/mobility  Outcome: Progressing     Problem: Neurological Deficit  Goal: Neurological status is stable or improving  Description  Interventions:  - Monitor and assess patient's level of consciousness, motor function, sensory function, and level of assistance needed for ADLs  - Monitor and report changes from baseline  Collaborate with interdisciplinary team to initiate plan and implement interventions as ordered  - Provide and maintain a safe environment  - Utilize seizure precautions  - Utilize fall precautions  - Utilize aspiration precautions  - Utilize bleeding precautions  Outcome: Progressing     Problem: Activity Intolerance/Impaired Mobility  Goal: Mobility/activity is maintained at optimum level for patient  Description  Interventions:  - Assess and monitor patient  barriers to mobility and need for assistive/adaptive devices  - Assess patient's emotional response to limitations  - Collaborate with interdisciplinary team and initiate plans and interventions as ordered  - Encourage independent activity per ability   - Maintain proper body alignment  - Perform active/passive rom as tolerated/ordered    - Plan activities to conserve energy   - Turn patient  Outcome: Progressing     Problem: Prexisting or High Potential for Compromised Skin Integrity  Goal: Skin integrity is maintained or improved  Description  INTERVENTIONS:  - Identify patients at risk for skin breakdown  - Assess and monitor skin integrity  - Assess and monitor nutrition and hydration status  - Monitor labs (i e  albumin)  - Assess for incontinence   - Turn and reposition patient  - Assist with mobility/ambulation  - Relieve pressure over bony prominences  - Avoid friction and shearing  - Provide appropriate hygiene as needed including keeping skin clean and dry  - Evaluate need for skin moisturizer/barrier cream  - Collaborate with interdisciplinary team (i e  Nutrition, Rehabilitation, etc )   - Patient/family teaching  Outcome: Progressing     Problem: DISCHARGE PLANNING - CARE MANAGEMENT  Goal: Discharge to post-acute care or home with appropriate resources  Description  INTERVENTIONS:  - Conduct assessment to determine patient/family and health care team treatment goals, and need for post-acute services based on payer coverage, community resources, and patient preferences, and barriers to discharge  - Address psychosocial, clinical, and financial barriers to discharge as identified in assessment in conjunction with the patient/family and health care team  - Arrange appropriate level of post-acute services according to patient's   needs and preference and payer coverage in collaboration with the physician and health care team  - Communicate with and update the patient/family, physician, and health care team regarding progress on the discharge plan  - Arrange appropriate transportation to post-acute venues  - Pt will be discharged to inpt rehab   Outcome: Progressing

## 2019-04-15 NOTE — PLAN OF CARE
Problem: Potential for Falls  Goal: Patient will remain free of falls  Description  INTERVENTIONS:  - Assess patient frequently for physical needs  -  Identify cognitive and physical deficits and behaviors that affect risk of falls  -  [place] fall precautions as indicated by assessment   - Educate patient/family on patient safety including physical limitations  - Instruct patient to call for assistance with activity based on assessment  - Modify environment to reduce risk of injury  - Consider OT/PT consult to assist with strengthening/mobility  Outcome: Adequate for Discharge     Problem: Neurological Deficit  Goal: Neurological status is stable or improving  Description  Interventions:  - Monitor and assess patient's level of consciousness, motor function, sensory function, and level of assistance needed for ADLs  - Monitor and report changes from baseline  Collaborate with interdisciplinary team to initiate plan and implement interventions as ordered  - Provide and maintain a safe environment  - Utilize seizure precautions  - Utilize fall precautions  - Utilize aspiration precautions  - Utilize bleeding precautions  Outcome: Adequate for Discharge     Problem: Activity Intolerance/Impaired Mobility  Goal: Mobility/activity is maintained at optimum level for patient  Description  Interventions:  - Assess and monitor patient  barriers to mobility and need for assistive/adaptive devices  - Assess patient's emotional response to limitations  - Collaborate with interdisciplinary team and initiate plans and interventions as ordered  - Encourage independent activity per ability   - Maintain proper body alignment  - Perform active/passive rom as tolerated/ordered    - Plan activities to conserve energy   - Turn patient  Outcome: Adequate for Discharge     Problem: Prexisting or High Potential for Compromised Skin Integrity  Goal: Skin integrity is maintained or improved  Description  INTERVENTIONS:  - Identify patients at risk for skin breakdown  - Assess and monitor skin integrity  - Assess and monitor nutrition and hydration status  - Monitor labs (i e  albumin)  - Assess for incontinence   - Turn and reposition patient  - Assist with mobility/ambulation  - Relieve pressure over bony prominences  - Avoid friction and shearing  - Provide appropriate hygiene as needed including keeping skin clean and dry  - Evaluate need for skin moisturizer/barrier cream  - Collaborate with interdisciplinary team (i e  Nutrition, Rehabilitation, etc )   - Patient/family teaching  Outcome: Adequate for Discharge     Problem: DISCHARGE PLANNING - CARE MANAGEMENT  Goal: Discharge to post-acute care or home with appropriate resources  Description  INTERVENTIONS:  - Conduct assessment to determine patient/family and health care team treatment goals, and need for post-acute services based on payer coverage, community resources, and patient preferences, and barriers to discharge  - Address psychosocial, clinical, and financial barriers to discharge as identified in assessment in conjunction with the patient/family and health care team  - Arrange appropriate level of post-acute services according to patient's   needs and preference and payer coverage in collaboration with the physician and health care team  - Communicate with and update the patient/family, physician, and health care team regarding progress on the discharge plan  - Arrange appropriate transportation to post-acute venues  - Pt will be discharged to inpt rehab   Outcome: Adequate for Discharge

## 2019-04-16 ENCOUNTER — TELEPHONE (OUTPATIENT)
Dept: NEUROLOGY | Facility: CLINIC | Age: 69
End: 2019-04-16

## 2019-04-19 ENCOUNTER — TELEPHONE (OUTPATIENT)
Dept: NEUROLOGY | Facility: CLINIC | Age: 69
End: 2019-04-19

## 2019-04-19 LAB
METHYLMALONATE SERPL-SCNC: 349 NMOL/L (ref 0–378)
SL AMB DISCLAIMER: NORMAL

## 2019-08-27 ENCOUNTER — APPOINTMENT (EMERGENCY)
Dept: RADIOLOGY | Facility: HOSPITAL | Age: 69
End: 2019-08-27

## 2019-08-27 ENCOUNTER — APPOINTMENT (EMERGENCY)
Dept: CT IMAGING | Facility: HOSPITAL | Age: 69
End: 2019-08-27

## 2019-08-27 ENCOUNTER — HOSPITAL ENCOUNTER (EMERGENCY)
Facility: HOSPITAL | Age: 69
Discharge: HOME | End: 2019-08-27
Attending: EMERGENCY MEDICINE | Admitting: EMERGENCY MEDICINE

## 2019-08-27 VITALS
DIASTOLIC BLOOD PRESSURE: 95 MMHG | TEMPERATURE: 99 F | HEART RATE: 92 BPM | OXYGEN SATURATION: 97 % | SYSTOLIC BLOOD PRESSURE: 145 MMHG

## 2019-08-27 DIAGNOSIS — S82.009A PATELLA FRACTURE: ICD-10-CM
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
## 2019-08-27 PROCEDURE — 99284 EMERGENCY DEPT VISIT MOD MDM: CPT

## 2019-08-27 PROCEDURE — 73564 X-RAY EXAM KNEE 4 OR MORE: CPT

## 2019-08-27 PROCEDURE — 70450 CT HEAD/BRAIN W/O DYE: CPT

## 2019-08-27 PROCEDURE — 93005 ELECTROCARDIOGRAM TRACING: CPT

## 2019-08-27 PROCEDURE — 99285 EMERGENCY DEPT VISIT HI MDM: CPT | Performed by: EMERGENCY MEDICINE

## 2019-08-27 RX ORDER — ACETAMINOPHEN 325 MG/1
650 TABLET ORAL ONCE
Status: COMPLETED | OUTPATIENT
Start: 2019-08-27 | End: 2019-08-27

## 2019-08-27 RX ADMIN — ACETAMINOPHEN 650 MG: 325 TABLET ORAL at 19:19

## 2019-08-27 NOTE — ED PROVIDER NOTES
History  Chief Complaint   Patient presents with    Fall     pt had wittnessed mechanical fall leaving giant, no head strike, no LOC  pt recalls events  pt denies any complaints currently  21-year-old gentleman comes in for evaluation after fall  Patient states he tripped over a door mat walking out of the local grocery store  Patient denies striking his head or any loss of consciousness  Patient states he landed on his left knee and now has pain in that knee  Patient is on Plavix  Of note patient had similar presentation in April and was complaining about knee pain any ended up being admitted for a stroke  Currently has no focal neurological deficit  History provided by:  Patient and medical records   used: No    Fall   Mechanism of injury: fall    Injury location:  Leg  Leg injury location:  L knee  Incident location:  Street  Fall:     Fall occurred:  Tripped and walking    Impact surface:  [place]    Point of impact:  Knees  Protective equipment: none    Suspicion of alcohol use: no    Suspicion of drug use: no    Tetanus status:  Unknown  Prior to arrival data:     Bystander interventions:  None    Patient ambulatory at scene: yes      Blood loss:  None    Responsiveness at scene:  Alert    Orientation at scene:  Person, place and situation    Loss of consciousness: no      Amnesic to event: no    Associated symptoms: no abdominal pain, no chest pain, no headaches and no seizures        Prior to Admission Medications   Prescriptions Last Dose Informant Patient Reported? Taking?    amLODIPine (NORVASC) 5 mg tablet   No Yes   Sig: Take 1 tablet (5 mg total) by mouth daily   aspirin 81 mg chewable tablet   No Yes   Sig: Chew 1 tablet (81 mg total) daily   atorvastatin (LIPITOR) 80 mg tablet   No Yes   Sig: Take 1 tablet (80 mg total) by mouth daily with dinner   clopidogrel (PLAVIX) 75 mg tablet   No Yes   Sig: Take 1 tablet (75 mg total) by mouth daily for 28 days Facility-Administered Medications: None       Past Medical History:   Diagnosis Date    Hypertension     MCI (mild cognitive impairment)        History reviewed  No pertinent surgical history  History reviewed  No pertinent family history  I have reviewed and agree with the history as documented  Social History     Tobacco Use    Smoking status: Never Smoker    Smokeless tobacco: Never Used   Substance Use Topics    Alcohol use: Not Currently    Drug use: No        Review of Systems   Constitutional: Negative for activity change and appetite change  HENT: Negative for congestion and facial swelling  Eyes: Negative for discharge and redness  Respiratory: Negative for cough and wheezing  Cardiovascular: Negative for chest pain and leg swelling  Gastrointestinal: Negative for abdominal distention, abdominal pain and blood in stool  Endocrine: Negative for cold intolerance and polydipsia  Genitourinary: Negative for difficulty urinating and hematuria  Musculoskeletal: Negative for arthralgias and gait problem  Skin: Negative for color change and rash  Allergic/Immunologic: Negative for food allergies and immunocompromised state  Neurological: Negative for dizziness, seizures and headaches  Hematological: Negative for adenopathy  Does not bruise/bleed easily  Psychiatric/Behavioral: Negative for agitation, confusion and decreased concentration  All other systems reviewed and are negative  Physical Exam  Physical Exam   Constitutional: He is oriented to person, place, and time  He appears well-developed and well-nourished  Non-toxic appearance  HENT:   Head: Normocephalic and atraumatic  Right Ear: Tympanic membrane normal    Left Ear: Tympanic membrane normal    Nose: Nose normal    Mouth/Throat: Oropharynx is clear and moist    Eyes: Pupils are equal, round, and reactive to light   Conjunctivae, EOM and lids are normal    Neck: Trachea normal and normal range of motion  Neck supple  No JVD present  Carotid bruit is not present  Cardiovascular: Normal rate, regular rhythm, normal heart sounds and intact distal pulses  No extrasystoles are present  Pulmonary/Chest: Effort normal  He has no decreased breath sounds  He has no wheezes  He has no rhonchi  He has no rales  He exhibits no tenderness and no deformity  Abdominal: Soft  Bowel sounds are normal  There is no tenderness  There is no rebound, no guarding and no CVA tenderness  Musculoskeletal:        Right shoulder: He exhibits normal range of motion, no tenderness, no swelling and no deformity  Right knee: He exhibits decreased range of motion  Tenderness found  Cervical back: Normal  He exhibits normal range of motion, no tenderness, no bony tenderness and no deformity  Lymphadenopathy:     He has no cervical adenopathy  He has no axillary adenopathy  Neurological: He is alert and oriented to person, place, and time  He has normal strength and normal reflexes  No cranial nerve deficit or sensory deficit  He displays a negative Romberg sign  Skin: Skin is warm and dry  Psychiatric: He has a normal mood and affect  His speech is normal and behavior is normal  Judgment and thought content normal  Cognition and memory are normal    Nursing note and vitals reviewed        Vital Signs  ED Triage Vitals   Temp Pulse Resp Blood Pressure SpO2   -- 08/27/19 1915 -- 08/27/19 1915 08/27/19 1915    98  126/76 98 %      Temp src Heart Rate Source Patient Position - Orthostatic VS BP Location FiO2 (%)   -- -- -- -- --             Pain Score       08/27/19 1911       Worst Possible Pain           Vitals:    08/27/19 1915   BP: 126/76   Pulse: 98         Visual Acuity      ED Medications  Medications   acetaminophen (TYLENOL) tablet 650 mg (650 mg Oral Given 8/27/19 1919)       Diagnostic Studies  Results Reviewed     None                 CT head without contrast   Final Result by Alba Mckeon MD (08/27 1954)      No acute intracranial abnormality  Workstation performed: [id number]         XR knee 4+ views left injury    (Results Pending)              Procedures  Procedures       ED Course                               MDM  Number of Diagnoses or Management Options  Fall, initial encounter: new and requires workup  Patella fracture: established and worsening     Amount and/or Complexity of Data Reviewed  Tests in the radiology section of CPT®: ordered and reviewed  Independent visualization of images, tracings, or specimens: yes (Patient has patella fracture on old film however patient is complaining of pain in this area will treat conservatively knee immobilizer and follow up with Orthopedics)    Patient Progress  Patient progress: stable      Disposition  Final diagnoses:   Fall, initial encounter   Patella fracture     Time reflects when diagnosis was documented in both MDM as applicable and the Disposition within this note     Time User Action Codes Description Comment    8/27/2019  8:25 PM Donna Race Add [I04  DUUM] Fall, initial encounter     8/27/2019  8:25 PM Donna Race Add [[id number]] Patella fracture       ED Disposition     ED Disposition Condition Date/Time Comment    Discharge Stable Tue Aug 27, 2019  8:25 PM [place] Dust discharge to home/self care  Follow-up Information     Follow up With Specialties Details Why Contact Info Additional [phone] [facility] Specialists [place] [facility] Schedule an appointment as soon as possible for a visit  Patella fracture [address]  [facility] [address] [phone]  [address] [place] [address] Specialists [place], [facility] [address] [place], [place], [phone]          Patient's Medications   Discharge Prescriptions    No medications on file     No discharge procedures on file      ED Provider  Electronically Signed by           Vick Pérez DO  08/27/19 2029

## 2019-08-29 LAB
ATRIAL RATE: 102 BPM
P AXIS: 77 DEGREES
PR INTERVAL: 178 MS
QRS AXIS: 52 DEGREES
QRSD INTERVAL: 78 MS
QT INTERVAL: 318 MS
QTC INTERVAL: 414 MS
T WAVE AXIS: 76 DEGREES
VENTRICULAR RATE: 102 BPM

## 2019-08-29 PROCEDURE — 93010 ELECTROCARDIOGRAM REPORT: CPT | Performed by: INTERNAL MEDICINE

## 2020-04-15 ENCOUNTER — APPOINTMENT (EMERGENCY)
Dept: RADIOLOGY | Facility: HOSPITAL | Age: 70
DRG: 871 | End: 2020-04-15
Payer: MEDICARE

## 2020-04-15 ENCOUNTER — HOSPITAL ENCOUNTER (INPATIENT)
Facility: HOSPITAL | Age: 70
LOS: 16 days | Discharge: NURSING, REHAB OR LONG-TERM CARE | DRG: 871 | End: 2020-05-01
Attending: EMERGENCY MEDICINE | Admitting: INTERNAL MEDICINE
Payer: MEDICARE

## 2020-04-15 DIAGNOSIS — J12.82 PNEUMONIA DUE TO COVID-19 VIRUS: Primary | ICD-10-CM

## 2020-04-15 DIAGNOSIS — R09.02 HYPOXIA: ICD-10-CM

## 2020-04-15 DIAGNOSIS — J96.01 ACUTE RESPIRATORY FAILURE WITH HYPOXIA (HCC): ICD-10-CM

## 2020-04-15 DIAGNOSIS — U07.1 PNEUMONIA DUE TO COVID-19 VIRUS: Primary | ICD-10-CM

## 2020-04-15 DIAGNOSIS — U07.1 COVID-19: ICD-10-CM

## 2020-04-15 LAB
25(OH)D3 SERPL-MCNC: 11.1 NG/ML (ref 30–100)
ALBUMIN SERPL BCP-MCNC: 2.4 G/DL (ref 3.5–5)
ALP SERPL-CCNC: 72 U/L (ref 46–116)
ALT SERPL W P-5'-P-CCNC: 60 U/L (ref 12–78)
ANION GAP SERPL CALCULATED.3IONS-SCNC: 11 MMOL/L (ref 4–13)
APTT PPP: 39 SECONDS (ref 23–37)
AST SERPL W P-5'-P-CCNC: 78 U/L (ref 5–45)
BASOPHILS # BLD AUTO: 0.01 THOUSANDS/ΜL (ref 0–0.1)
BASOPHILS NFR BLD AUTO: 0 % (ref 0–1)
BILIRUB SERPL-MCNC: 0.53 MG/DL (ref 0.2–1)
BUN SERPL-MCNC: 14 MG/DL (ref 5–25)
CALCIUM SERPL-MCNC: 8 MG/DL (ref 8.3–10.1)
CHLORIDE SERPL-SCNC: 106 MMOL/L (ref 100–108)
CK MB SERPL-MCNC: 5.7 NG/ML (ref 0–5)
CK MB SERPL-MCNC: <1 % (ref 0–2.5)
CK SERPL-CCNC: 924 U/L (ref 39–308)
CO2 SERPL-SCNC: 26 MMOL/L (ref 21–32)
CREAT SERPL-MCNC: 0.99 MG/DL (ref 0.6–1.3)
CRP SERPL QL: 191.9 MG/L
D DIMER PPP FEU-MCNC: 1.52 UG/ML FEU
EOSINOPHIL # BLD AUTO: 0 THOUSAND/ΜL (ref 0–0.61)
EOSINOPHIL NFR BLD AUTO: 0 % (ref 0–6)
ERYTHROCYTE [DISTWIDTH] IN BLOOD BY AUTOMATED COUNT: 15.1 % (ref 11.6–15.1)
FERRITIN SERPL-MCNC: 175 NG/ML (ref 8–388)
FIBRINOGEN PPP-MCNC: 650 MG/DL (ref 227–495)
GFR SERPL CREATININE-BSD FRML MDRD: 77 ML/MIN/1.73SQ M
GLUCOSE SERPL-MCNC: 124 MG/DL (ref 65–140)
HCT VFR BLD AUTO: 34.7 % (ref 36.5–49.3)
HGB BLD-MCNC: 11 G/DL (ref 12–17)
IMM GRANULOCYTES # BLD AUTO: 0.06 THOUSAND/UL (ref 0–0.2)
IMM GRANULOCYTES NFR BLD AUTO: 1 % (ref 0–2)
INR PPP: 1.18 (ref 0.84–1.19)
LACTATE SERPL-SCNC: 1 MMOL/L (ref 0.5–2)
LYMPHOCYTES # BLD AUTO: 0.57 THOUSANDS/ΜL (ref 0.6–4.47)
LYMPHOCYTES NFR BLD AUTO: 6 % (ref 14–44)
MCH RBC QN AUTO: 28.5 PG (ref 26.8–34.3)
MCHC RBC AUTO-ENTMCNC: 31.7 G/DL (ref 31.4–37.4)
MCV RBC AUTO: 90 FL (ref 82–98)
MONOCYTES # BLD AUTO: 0.89 THOUSAND/ΜL (ref 0.17–1.22)
MONOCYTES NFR BLD AUTO: 10 % (ref 4–12)
NEUTROPHILS # BLD AUTO: 7.4 THOUSANDS/ΜL (ref 1.85–7.62)
NEUTS SEG NFR BLD AUTO: 83 % (ref 43–75)
NRBC BLD AUTO-RTO: 0 /100 WBCS
NT-PROBNP SERPL-MCNC: 684 PG/ML
PLATELET # BLD AUTO: 209 THOUSANDS/UL (ref 149–390)
PMV BLD AUTO: 11.4 FL (ref 8.9–12.7)
POTASSIUM SERPL-SCNC: 3.4 MMOL/L (ref 3.5–5.3)
PROCALCITONIN SERPL-MCNC: 0.06 NG/ML
PROT SERPL-MCNC: 6.3 G/DL (ref 6.4–8.2)
PROTHROMBIN TIME: 14.4 SECONDS (ref 11.6–14.5)
RBC # BLD AUTO: 3.86 MILLION/UL (ref 3.88–5.62)
SODIUM SERPL-SCNC: 143 MMOL/L (ref 136–145)
TROPONIN I SERPL-MCNC: 0.05 NG/ML
WBC # BLD AUTO: 8.93 THOUSAND/UL (ref 4.31–10.16)

## 2020-04-15 PROCEDURE — 82550 ASSAY OF CK (CPK): CPT | Performed by: EMERGENCY MEDICINE

## 2020-04-15 PROCEDURE — 85384 FIBRINOGEN ACTIVITY: CPT | Performed by: EMERGENCY MEDICINE

## 2020-04-15 PROCEDURE — 85379 FIBRIN DEGRADATION QUANT: CPT | Performed by: EMERGENCY MEDICINE

## 2020-04-15 PROCEDURE — 94760 N-INVAS EAR/PLS OXIMETRY 1: CPT

## 2020-04-15 PROCEDURE — 85025 COMPLETE CBC W/AUTO DIFF WBC: CPT | Performed by: EMERGENCY MEDICINE

## 2020-04-15 PROCEDURE — 83615 LACTATE (LD) (LDH) ENZYME: CPT | Performed by: EMERGENCY MEDICINE

## 2020-04-15 PROCEDURE — 99223 1ST HOSP IP/OBS HIGH 75: CPT | Performed by: INTERNAL MEDICINE

## 2020-04-15 PROCEDURE — 84484 ASSAY OF TROPONIN QUANT: CPT | Performed by: EMERGENCY MEDICINE

## 2020-04-15 PROCEDURE — 87040 BLOOD CULTURE FOR BACTERIA: CPT | Performed by: EMERGENCY MEDICINE

## 2020-04-15 PROCEDURE — 83880 ASSAY OF NATRIURETIC PEPTIDE: CPT | Performed by: EMERGENCY MEDICINE

## 2020-04-15 PROCEDURE — 96374 THER/PROPH/DIAG INJ IV PUSH: CPT

## 2020-04-15 PROCEDURE — 80053 COMPREHEN METABOLIC PANEL: CPT | Performed by: EMERGENCY MEDICINE

## 2020-04-15 PROCEDURE — 82306 VITAMIN D 25 HYDROXY: CPT | Performed by: PHYSICIAN ASSISTANT

## 2020-04-15 PROCEDURE — 99285 EMERGENCY DEPT VISIT HI MDM: CPT

## 2020-04-15 PROCEDURE — 36415 COLL VENOUS BLD VENIPUNCTURE: CPT | Performed by: EMERGENCY MEDICINE

## 2020-04-15 PROCEDURE — 94762 N-INVAS EAR/PLS OXIMTRY CONT: CPT

## 2020-04-15 PROCEDURE — 85610 PROTHROMBIN TIME: CPT | Performed by: EMERGENCY MEDICINE

## 2020-04-15 PROCEDURE — 94002 VENT MGMT INPAT INIT DAY: CPT

## 2020-04-15 PROCEDURE — 83625 ASSAY OF LDH ENZYMES: CPT | Performed by: EMERGENCY MEDICINE

## 2020-04-15 PROCEDURE — 84145 PROCALCITONIN (PCT): CPT | Performed by: EMERGENCY MEDICINE

## 2020-04-15 PROCEDURE — 82553 CREATINE MB FRACTION: CPT | Performed by: EMERGENCY MEDICINE

## 2020-04-15 PROCEDURE — 83605 ASSAY OF LACTIC ACID: CPT | Performed by: EMERGENCY MEDICINE

## 2020-04-15 PROCEDURE — 99285 EMERGENCY DEPT VISIT HI MDM: CPT | Performed by: EMERGENCY MEDICINE

## 2020-04-15 PROCEDURE — 93005 ELECTROCARDIOGRAM TRACING: CPT

## 2020-04-15 PROCEDURE — 86140 C-REACTIVE PROTEIN: CPT | Performed by: EMERGENCY MEDICINE

## 2020-04-15 PROCEDURE — 82728 ASSAY OF FERRITIN: CPT | Performed by: EMERGENCY MEDICINE

## 2020-04-15 PROCEDURE — 85730 THROMBOPLASTIN TIME PARTIAL: CPT | Performed by: EMERGENCY MEDICINE

## 2020-04-15 PROCEDURE — 71045 X-RAY EXAM CHEST 1 VIEW: CPT

## 2020-04-15 RX ORDER — POLYETHYLENE GLYCOL 3350 17 G/17G
17 POWDER, FOR SOLUTION ORAL DAILY PRN
COMMUNITY

## 2020-04-15 RX ORDER — AMLODIPINE BESYLATE 5 MG/1
5 TABLET ORAL DAILY
Status: DISCONTINUED | OUTPATIENT
Start: 2020-04-16 | End: 2020-05-01 | Stop reason: HOSPADM

## 2020-04-15 RX ORDER — SUCRALFATE 1 G/1
1 TABLET ORAL
Status: DISCONTINUED | OUTPATIENT
Start: 2020-04-16 | End: 2020-05-01 | Stop reason: HOSPADM

## 2020-04-15 RX ORDER — CLOPIDOGREL BISULFATE 75 MG/1
75 TABLET ORAL DAILY
Status: DISCONTINUED | OUTPATIENT
Start: 2020-04-16 | End: 2020-05-01 | Stop reason: HOSPADM

## 2020-04-15 RX ORDER — POLYETHYLENE GLYCOL 3350 17 G/17G
17 POWDER, FOR SOLUTION ORAL DAILY PRN
Status: DISCONTINUED | OUTPATIENT
Start: 2020-04-15 | End: 2020-04-24

## 2020-04-15 RX ORDER — PANTOPRAZOLE SODIUM 40 MG/1
40 TABLET, DELAYED RELEASE ORAL DAILY
COMMUNITY

## 2020-04-15 RX ORDER — AZITHROMYCIN 500 MG/1
500 TABLET, FILM COATED ORAL ONCE
Status: COMPLETED | OUTPATIENT
Start: 2020-04-15 | End: 2020-04-15

## 2020-04-15 RX ORDER — ASCORBIC ACID 500 MG
1000 TABLET ORAL 2 TIMES DAILY
Status: DISPENSED | OUTPATIENT
Start: 2020-04-15 | End: 2020-04-21

## 2020-04-15 RX ORDER — SUCRALFATE 1 G/1
1 TABLET ORAL
COMMUNITY

## 2020-04-15 RX ORDER — ACETAMINOPHEN 325 MG/1
650 TABLET ORAL EVERY 6 HOURS PRN
Status: DISCONTINUED | OUTPATIENT
Start: 2020-04-15 | End: 2020-04-16

## 2020-04-15 RX ORDER — HYDROXYCHLOROQUINE SULFATE 200 MG/1
200 TABLET, FILM COATED ORAL 2 TIMES DAILY
Status: DISCONTINUED | OUTPATIENT
Start: 2020-04-16 | End: 2020-04-20

## 2020-04-15 RX ORDER — ESCITALOPRAM OXALATE 10 MG/1
10 TABLET ORAL DAILY
Status: DISCONTINUED | OUTPATIENT
Start: 2020-04-16 | End: 2020-05-01 | Stop reason: HOSPADM

## 2020-04-15 RX ORDER — GUAIFENESIN 600 MG
600 TABLET, EXTENDED RELEASE 12 HR ORAL 2 TIMES DAILY
Status: DISCONTINUED | OUTPATIENT
Start: 2020-04-15 | End: 2020-04-20

## 2020-04-15 RX ORDER — ASPIRIN 81 MG/1
81 TABLET, CHEWABLE ORAL DAILY
Status: DISCONTINUED | OUTPATIENT
Start: 2020-04-16 | End: 2020-05-01 | Stop reason: HOSPADM

## 2020-04-15 RX ORDER — ZINC SULFATE 50(220)MG
220 CAPSULE ORAL DAILY
Status: DISCONTINUED | OUTPATIENT
Start: 2020-04-16 | End: 2020-04-21

## 2020-04-15 RX ORDER — AZITHROMYCIN 250 MG/1
250 TABLET, FILM COATED ORAL EVERY 24 HOURS
Status: DISCONTINUED | OUTPATIENT
Start: 2020-04-16 | End: 2020-04-15

## 2020-04-15 RX ORDER — PANTOPRAZOLE SODIUM 40 MG/1
40 TABLET, DELAYED RELEASE ORAL DAILY
Status: DISCONTINUED | OUTPATIENT
Start: 2020-04-16 | End: 2020-05-01 | Stop reason: HOSPADM

## 2020-04-15 RX ORDER — METHYLPREDNISOLONE SODIUM SUCCINATE 125 MG/2ML
100 INJECTION, POWDER, LYOPHILIZED, FOR SOLUTION INTRAMUSCULAR; INTRAVENOUS DAILY
Status: DISCONTINUED | OUTPATIENT
Start: 2020-04-16 | End: 2020-04-23

## 2020-04-15 RX ORDER — ATORVASTATIN CALCIUM 40 MG/1
80 TABLET, FILM COATED ORAL
Status: DISCONTINUED | OUTPATIENT
Start: 2020-04-15 | End: 2020-05-01 | Stop reason: HOSPADM

## 2020-04-15 RX ORDER — MELATONIN
2000 DAILY
Status: DISCONTINUED | OUTPATIENT
Start: 2020-04-16 | End: 2020-05-01 | Stop reason: HOSPADM

## 2020-04-15 RX ORDER — TAMSULOSIN HYDROCHLORIDE 0.4 MG/1
0.4 CAPSULE ORAL
COMMUNITY

## 2020-04-15 RX ORDER — TAMSULOSIN HYDROCHLORIDE 0.4 MG/1
0.4 CAPSULE ORAL
Status: DISCONTINUED | OUTPATIENT
Start: 2020-04-15 | End: 2020-05-01 | Stop reason: HOSPADM

## 2020-04-15 RX ORDER — AZITHROMYCIN 250 MG/1
250 TABLET, FILM COATED ORAL EVERY 24 HOURS
Status: DISCONTINUED | OUTPATIENT
Start: 2020-04-16 | End: 2020-04-16

## 2020-04-15 RX ORDER — AZITHROMYCIN 250 MG/1
TABLET, FILM COATED ORAL
Status: COMPLETED
Start: 2020-04-15 | End: 2020-04-15

## 2020-04-15 RX ORDER — ESCITALOPRAM OXALATE 10 MG/1
10 TABLET ORAL DAILY
COMMUNITY

## 2020-04-15 RX ORDER — HYDROXYCHLOROQUINE SULFATE 200 MG/1
400 TABLET, FILM COATED ORAL 2 TIMES DAILY
Status: COMPLETED | OUTPATIENT
Start: 2020-04-15 | End: 2020-04-16

## 2020-04-15 RX ADMIN — ACETAMINOPHEN 650 MG: 325 TABLET, FILM COATED ORAL at 19:53

## 2020-04-15 RX ADMIN — AZITHROMYCIN 500 MG: 500 TABLET, FILM COATED ORAL at 15:01

## 2020-04-15 RX ADMIN — HYDROXYCHLOROQUINE SULFATE 400 MG: 200 TABLET, FILM COATED ORAL at 19:55

## 2020-04-15 RX ADMIN — OXYCODONE HYDROCHLORIDE AND ACETAMINOPHEN 1000 MG: 500 TABLET ORAL at 19:55

## 2020-04-15 RX ADMIN — CEFTRIAXONE SODIUM 1000 MG: 10 INJECTION, POWDER, FOR SOLUTION INTRAVENOUS at 15:02

## 2020-04-15 RX ADMIN — AZITHROMYCIN 500 MG: 250 TABLET, FILM COATED ORAL at 15:01

## 2020-04-15 RX ADMIN — TAMSULOSIN HYDROCHLORIDE 0.4 MG: 0.4 CAPSULE ORAL at 19:56

## 2020-04-16 LAB
ALBUMIN SERPL BCP-MCNC: 2.3 G/DL (ref 3.5–5)
ALP SERPL-CCNC: 70 U/L (ref 46–116)
ALT SERPL W P-5'-P-CCNC: 71 U/L (ref 12–78)
ANION GAP SERPL CALCULATED.3IONS-SCNC: 10 MMOL/L (ref 4–13)
ARTERIAL PATENCY WRIST A: YES
AST SERPL W P-5'-P-CCNC: 93 U/L (ref 5–45)
ATRIAL RATE: 92 BPM
BASE EXCESS BLDA CALC-SCNC: 0.8 MMOL/L
BASOPHILS # BLD AUTO: 0.01 THOUSANDS/ΜL (ref 0–0.1)
BASOPHILS NFR BLD AUTO: 0 % (ref 0–1)
BILIRUB SERPL-MCNC: 0.43 MG/DL (ref 0.2–1)
BUN SERPL-MCNC: 16 MG/DL (ref 5–25)
CALCIUM SERPL-MCNC: 8 MG/DL (ref 8.3–10.1)
CHLORIDE SERPL-SCNC: 108 MMOL/L (ref 100–108)
CO2 SERPL-SCNC: 26 MMOL/L (ref 21–32)
CREAT SERPL-MCNC: 1.02 MG/DL (ref 0.6–1.3)
CRP SERPL QL: 199.4 MG/L
D DIMER PPP FEU-MCNC: 1.52 UG/ML FEU
EOSINOPHIL # BLD AUTO: 0 THOUSAND/ΜL (ref 0–0.61)
EOSINOPHIL NFR BLD AUTO: 0 % (ref 0–6)
ERYTHROCYTE [DISTWIDTH] IN BLOOD BY AUTOMATED COUNT: 15.4 % (ref 11.6–15.1)
FERRITIN SERPL-MCNC: 185 NG/ML (ref 8–388)
GFR SERPL CREATININE-BSD FRML MDRD: 74 ML/MIN/1.73SQ M
GLUCOSE SERPL-MCNC: 122 MG/DL (ref 65–140)
GLUCOSE SERPL-MCNC: 141 MG/DL (ref 65–140)
HCO3 BLDA-SCNC: 24.8 MMOL/L (ref 22–28)
HCT VFR BLD AUTO: 33.6 % (ref 36.5–49.3)
HGB BLD-MCNC: 10.5 G/DL (ref 12–17)
IMM GRANULOCYTES # BLD AUTO: 0.12 THOUSAND/UL (ref 0–0.2)
IMM GRANULOCYTES NFR BLD AUTO: 1 % (ref 0–2)
LDH SERPL-CCNC: 430 IU/L (ref 121–224)
LDH SERPL-CCNC: 472 U/L (ref 81–234)
LDH1 CFR SERPL ELPH: 19 % (ref 17–32)
LDH2 CFR SERPL ELPH: 30 % (ref 25–40)
LDH3 CFR SERPL ELPH: 26 % (ref 17–27)
LDH4 CFR SERPL ELPH: 13 % (ref 5–13)
LDH5 CFR SERPL ELPH: 12 % (ref 4–20)
LYMPHOCYTES # BLD AUTO: 0.43 THOUSANDS/ΜL (ref 0.6–4.47)
LYMPHOCYTES NFR BLD AUTO: 4 % (ref 14–44)
MCH RBC QN AUTO: 28 PG (ref 26.8–34.3)
MCHC RBC AUTO-ENTMCNC: 31.3 G/DL (ref 31.4–37.4)
MCV RBC AUTO: 90 FL (ref 82–98)
MONOCYTES # BLD AUTO: 0.78 THOUSAND/ΜL (ref 0.17–1.22)
MONOCYTES NFR BLD AUTO: 7 % (ref 4–12)
NASAL CANNULA: 15
NEUTROPHILS # BLD AUTO: 10.17 THOUSANDS/ΜL (ref 1.85–7.62)
NEUTS SEG NFR BLD AUTO: 88 % (ref 43–75)
NRBC BLD AUTO-RTO: 0 /100 WBCS
O2 CT BLDA-SCNC: 13.7 ML/DL (ref 16–23)
OXYHGB MFR BLDA: 91.6 % (ref 94–97)
P AXIS: 41 DEGREES
PCO2 BLDA: 37.4 MM HG (ref 36–44)
PH BLDA: 7.44 [PH] (ref 7.35–7.45)
PLATELET # BLD AUTO: 232 THOUSANDS/UL (ref 149–390)
PMV BLD AUTO: 11.2 FL (ref 8.9–12.7)
PO2 BLDA: 66.9 MM HG (ref 75–129)
POTASSIUM SERPL-SCNC: 3.5 MMOL/L (ref 3.5–5.3)
PR INTERVAL: 158 MS
PROCALCITONIN SERPL-MCNC: 0.09 NG/ML
PROT SERPL-MCNC: 6.2 G/DL (ref 6.4–8.2)
QRS AXIS: 46 DEGREES
QRSD INTERVAL: 76 MS
QT INTERVAL: 326 MS
QTC INTERVAL: 403 MS
RBC # BLD AUTO: 3.75 MILLION/UL (ref 3.88–5.62)
SODIUM SERPL-SCNC: 144 MMOL/L (ref 136–145)
SPECIMEN SOURCE: ABNORMAL
T WAVE AXIS: 43 DEGREES
VENTRICULAR RATE: 92 BPM
WBC # BLD AUTO: 11.51 THOUSAND/UL (ref 4.31–10.16)

## 2020-04-16 PROCEDURE — 94664 DEMO&/EVAL PT USE INHALER: CPT

## 2020-04-16 PROCEDURE — 82728 ASSAY OF FERRITIN: CPT | Performed by: PHYSICIAN ASSISTANT

## 2020-04-16 PROCEDURE — 93010 ELECTROCARDIOGRAM REPORT: CPT | Performed by: INTERNAL MEDICINE

## 2020-04-16 PROCEDURE — 94668 MNPJ CHEST WALL SBSQ: CPT

## 2020-04-16 PROCEDURE — 84145 PROCALCITONIN (PCT): CPT | Performed by: PHYSICIAN ASSISTANT

## 2020-04-16 PROCEDURE — 82948 REAGENT STRIP/BLOOD GLUCOSE: CPT

## 2020-04-16 PROCEDURE — 99255 IP/OBS CONSLTJ NEW/EST HI 80: CPT | Performed by: INTERNAL MEDICINE

## 2020-04-16 PROCEDURE — 94760 N-INVAS EAR/PLS OXIMETRY 1: CPT

## 2020-04-16 PROCEDURE — 80053 COMPREHEN METABOLIC PANEL: CPT | Performed by: PHYSICIAN ASSISTANT

## 2020-04-16 PROCEDURE — 99233 SBSQ HOSP IP/OBS HIGH 50: CPT | Performed by: INTERNAL MEDICINE

## 2020-04-16 PROCEDURE — 82805 BLOOD GASES W/O2 SATURATION: CPT | Performed by: INTERNAL MEDICINE

## 2020-04-16 PROCEDURE — 85025 COMPLETE CBC W/AUTO DIFF WBC: CPT | Performed by: PHYSICIAN ASSISTANT

## 2020-04-16 PROCEDURE — 83615 LACTATE (LD) (LDH) ENZYME: CPT | Performed by: PHYSICIAN ASSISTANT

## 2020-04-16 PROCEDURE — 94762 N-INVAS EAR/PLS OXIMTRY CONT: CPT

## 2020-04-16 PROCEDURE — 86140 C-REACTIVE PROTEIN: CPT | Performed by: PHYSICIAN ASSISTANT

## 2020-04-16 PROCEDURE — 83520 IMMUNOASSAY QUANT NOS NONAB: CPT | Performed by: PHYSICIAN ASSISTANT

## 2020-04-16 PROCEDURE — 85379 FIBRIN DEGRADATION QUANT: CPT | Performed by: PHYSICIAN ASSISTANT

## 2020-04-16 PROCEDURE — 36600 WITHDRAWAL OF ARTERIAL BLOOD: CPT

## 2020-04-16 PROCEDURE — 94003 VENT MGMT INPAT SUBQ DAY: CPT

## 2020-04-16 RX ORDER — ACETAMINOPHEN 325 MG/1
650 TABLET ORAL EVERY 6 HOURS PRN
Status: DISCONTINUED | OUTPATIENT
Start: 2020-04-16 | End: 2020-05-01 | Stop reason: HOSPADM

## 2020-04-16 RX ORDER — ACETAMINOPHEN 650 MG/1
650 SUPPOSITORY RECTAL EVERY 6 HOURS PRN
Status: DISCONTINUED | OUTPATIENT
Start: 2020-04-16 | End: 2020-04-16

## 2020-04-16 RX ORDER — DOXYCYCLINE HYCLATE 100 MG/1
100 CAPSULE ORAL EVERY 12 HOURS SCHEDULED
Status: DISCONTINUED | OUTPATIENT
Start: 2020-04-16 | End: 2020-04-17

## 2020-04-16 RX ADMIN — ACETAMINOPHEN 650 MG: 325 TABLET, FILM COATED ORAL at 12:21

## 2020-04-16 RX ADMIN — VANCOMYCIN HYDROCHLORIDE 2000 MG: 1 INJECTION, POWDER, LYOPHILIZED, FOR SOLUTION INTRAVENOUS at 13:31

## 2020-04-16 RX ADMIN — METHYLPREDNISOLONE SODIUM SUCCINATE 100 MG: 125 INJECTION, POWDER, FOR SOLUTION INTRAMUSCULAR; INTRAVENOUS at 10:02

## 2020-04-16 RX ADMIN — VANCOMYCIN HYDROCHLORIDE 1250 MG: 5 INJECTION, POWDER, LYOPHILIZED, FOR SOLUTION INTRAVENOUS at 23:45

## 2020-04-16 RX ADMIN — ACETAMINOPHEN 650 MG: 650 SUPPOSITORY RECTAL at 04:30

## 2020-04-16 RX ADMIN — OXYCODONE HYDROCHLORIDE AND ACETAMINOPHEN 1000 MG: 500 TABLET ORAL at 09:56

## 2020-04-16 RX ADMIN — ENOXAPARIN SODIUM 40 MG: 40 INJECTION SUBCUTANEOUS at 10:04

## 2020-04-16 RX ADMIN — CEFEPIME HYDROCHLORIDE 2000 MG: 2 INJECTION, POWDER, FOR SOLUTION INTRAVENOUS at 12:40

## 2020-04-16 RX ADMIN — ASPIRIN 81 MG 81 MG: 81 TABLET ORAL at 09:57

## 2020-04-16 RX ADMIN — CLOPIDOGREL BISULFATE 75 MG: 75 TABLET ORAL at 09:57

## 2020-04-16 RX ADMIN — ESCITALOPRAM OXALATE 10 MG: 10 TABLET, FILM COATED ORAL at 09:57

## 2020-04-16 RX ADMIN — HYDROXYCHLOROQUINE SULFATE 400 MG: 200 TABLET, FILM COATED ORAL at 09:56

## 2020-04-16 RX ADMIN — CEFEPIME HYDROCHLORIDE 2000 MG: 2 INJECTION, POWDER, FOR SOLUTION INTRAVENOUS at 22:52

## 2020-04-17 PROBLEM — Z71.89 GOALS OF CARE, COUNSELING/DISCUSSION: Status: ACTIVE | Noted: 2020-04-17

## 2020-04-17 LAB
ANISOCYTOSIS BLD QL SMEAR: PRESENT
BASOPHILS # BLD MANUAL: 0 THOUSAND/UL (ref 0–0.1)
BASOPHILS NFR MAR MANUAL: 0 % (ref 0–1)
CRP SERPL QL: 197.4 MG/L
EOSINOPHIL # BLD MANUAL: 0 THOUSAND/UL (ref 0–0.4)
EOSINOPHIL NFR BLD MANUAL: 0 % (ref 0–6)
ERYTHROCYTE [DISTWIDTH] IN BLOOD BY AUTOMATED COUNT: 15.3 % (ref 11.6–15.1)
HCT VFR BLD AUTO: 32.4 % (ref 36.5–49.3)
HGB BLD-MCNC: 10.1 G/DL (ref 12–17)
L PNEUMO1 AG UR QL IA.RAPID: NEGATIVE
LYMPHOCYTES # BLD AUTO: 0.33 THOUSAND/UL (ref 0.6–4.47)
LYMPHOCYTES # BLD AUTO: 2 % (ref 14–44)
MCH RBC QN AUTO: 27.8 PG (ref 26.8–34.3)
MCHC RBC AUTO-ENTMCNC: 31.2 G/DL (ref 31.4–37.4)
MCV RBC AUTO: 89 FL (ref 82–98)
MONOCYTES # BLD AUTO: 0.65 THOUSAND/UL (ref 0–1.22)
MONOCYTES NFR BLD: 4 % (ref 4–12)
NEUTROPHILS # BLD MANUAL: 15.3 THOUSAND/UL (ref 1.85–7.62)
NEUTS BAND NFR BLD MANUAL: 4 % (ref 0–8)
NEUTS SEG NFR BLD AUTO: 90 % (ref 43–75)
NRBC BLD AUTO-RTO: 0 /100 WBCS
NT-PROBNP SERPL-MCNC: 184 PG/ML
PLATELET # BLD AUTO: 251 THOUSANDS/UL (ref 149–390)
PLATELET BLD QL SMEAR: ADEQUATE
PMV BLD AUTO: 11.5 FL (ref 8.9–12.7)
POIKILOCYTOSIS BLD QL SMEAR: PRESENT
POLYCHROMASIA BLD QL SMEAR: PRESENT
PROCALCITONIN SERPL-MCNC: 0.11 NG/ML
RBC # BLD AUTO: 3.63 MILLION/UL (ref 3.88–5.62)
S PNEUM AG UR QL: NEGATIVE
TOTAL CELLS COUNTED SPEC: 100
WBC # BLD AUTO: 16.28 THOUSAND/UL (ref 4.31–10.16)

## 2020-04-17 PROCEDURE — 85027 COMPLETE CBC AUTOMATED: CPT | Performed by: INTERNAL MEDICINE

## 2020-04-17 PROCEDURE — 87449 NOS EACH ORGANISM AG IA: CPT | Performed by: INTERNAL MEDICINE

## 2020-04-17 PROCEDURE — 83880 ASSAY OF NATRIURETIC PEPTIDE: CPT | Performed by: INTERNAL MEDICINE

## 2020-04-17 PROCEDURE — 94760 N-INVAS EAR/PLS OXIMETRY 1: CPT

## 2020-04-17 PROCEDURE — NC001 PR NO CHARGE: Performed by: NURSE PRACTITIONER

## 2020-04-17 PROCEDURE — 85007 BL SMEAR W/DIFF WBC COUNT: CPT | Performed by: INTERNAL MEDICINE

## 2020-04-17 PROCEDURE — 99233 SBSQ HOSP IP/OBS HIGH 50: CPT | Performed by: INTERNAL MEDICINE

## 2020-04-17 PROCEDURE — 94762 N-INVAS EAR/PLS OXIMTRY CONT: CPT

## 2020-04-17 PROCEDURE — 99232 SBSQ HOSP IP/OBS MODERATE 35: CPT | Performed by: INTERNAL MEDICINE

## 2020-04-17 PROCEDURE — 94003 VENT MGMT INPAT SUBQ DAY: CPT

## 2020-04-17 PROCEDURE — 84145 PROCALCITONIN (PCT): CPT | Performed by: INTERNAL MEDICINE

## 2020-04-17 PROCEDURE — 86140 C-REACTIVE PROTEIN: CPT | Performed by: INTERNAL MEDICINE

## 2020-04-17 RX ORDER — MORPHINE SULFATE 4 MG/ML
4 INJECTION, SOLUTION INTRAMUSCULAR; INTRAVENOUS EVERY 2 HOUR PRN
Status: DISCONTINUED | OUTPATIENT
Start: 2020-04-17 | End: 2020-05-01 | Stop reason: HOSPADM

## 2020-04-17 RX ORDER — MORPHINE SULFATE 4 MG/ML
4 INJECTION, SOLUTION INTRAMUSCULAR; INTRAVENOUS EVERY 2 HOUR PRN
Status: DISCONTINUED | OUTPATIENT
Start: 2020-04-17 | End: 2020-04-17

## 2020-04-17 RX ADMIN — AMLODIPINE BESYLATE 5 MG: 5 TABLET ORAL at 08:50

## 2020-04-17 RX ADMIN — ASPIRIN 81 MG 81 MG: 81 TABLET ORAL at 08:50

## 2020-04-17 RX ADMIN — TAMSULOSIN HYDROCHLORIDE 0.4 MG: 0.4 CAPSULE ORAL at 18:01

## 2020-04-17 RX ADMIN — PANTOPRAZOLE SODIUM 40 MG: 40 TABLET, DELAYED RELEASE ORAL at 08:50

## 2020-04-17 RX ADMIN — OXYCODONE HYDROCHLORIDE AND ACETAMINOPHEN 1000 MG: 500 TABLET ORAL at 08:58

## 2020-04-17 RX ADMIN — SUCRALFATE 1 G: 1 TABLET ORAL at 12:15

## 2020-04-17 RX ADMIN — HYDROXYCHLOROQUINE SULFATE 200 MG: 200 TABLET, FILM COATED ORAL at 18:01

## 2020-04-17 RX ADMIN — GUAIFENESIN 600 MG: 600 TABLET, EXTENDED RELEASE ORAL at 18:01

## 2020-04-17 RX ADMIN — ESCITALOPRAM OXALATE 10 MG: 10 TABLET, FILM COATED ORAL at 08:50

## 2020-04-17 RX ADMIN — CLOPIDOGREL BISULFATE 75 MG: 75 TABLET ORAL at 08:50

## 2020-04-17 RX ADMIN — METHYLPREDNISOLONE SODIUM SUCCINATE 100 MG: 125 INJECTION, POWDER, FOR SOLUTION INTRAMUSCULAR; INTRAVENOUS at 08:51

## 2020-04-17 RX ADMIN — OXYCODONE HYDROCHLORIDE AND ACETAMINOPHEN 1000 MG: 500 TABLET ORAL at 18:01

## 2020-04-17 RX ADMIN — CHOLECALCIFEROL TAB 25 MCG (1000 UNIT) 2000 UNITS: 25 TAB at 08:50

## 2020-04-17 RX ADMIN — GUAIFENESIN 600 MG: 600 TABLET, EXTENDED RELEASE ORAL at 08:50

## 2020-04-17 RX ADMIN — SUCRALFATE 1 G: 1 TABLET ORAL at 18:01

## 2020-04-17 RX ADMIN — ATORVASTATIN CALCIUM 80 MG: 40 TABLET, FILM COATED ORAL at 18:01

## 2020-04-17 RX ADMIN — DOXYCYCLINE 100 MG: 100 CAPSULE ORAL at 08:50

## 2020-04-17 RX ADMIN — ENOXAPARIN SODIUM 40 MG: 40 INJECTION SUBCUTANEOUS at 08:51

## 2020-04-17 RX ADMIN — MORPHINE SULFATE 4 MG: 4 INJECTION INTRAVENOUS at 14:27

## 2020-04-17 RX ADMIN — HYDROXYCHLOROQUINE SULFATE 200 MG: 200 TABLET, FILM COATED ORAL at 08:50

## 2020-04-17 RX ADMIN — ZINC SULFATE 220 MG (50 MG) CAPSULE 220 MG: CAPSULE at 08:50

## 2020-04-18 LAB
ALBUMIN SERPL BCP-MCNC: 2 G/DL (ref 3.5–5)
ALP SERPL-CCNC: 66 U/L (ref 46–116)
ALT SERPL W P-5'-P-CCNC: 114 U/L (ref 12–78)
ANION GAP SERPL CALCULATED.3IONS-SCNC: 10 MMOL/L (ref 4–13)
ANISOCYTOSIS BLD QL SMEAR: PRESENT
AST SERPL W P-5'-P-CCNC: 97 U/L (ref 5–45)
BASOPHILS # BLD MANUAL: 0 THOUSAND/UL (ref 0–0.1)
BASOPHILS NFR MAR MANUAL: 0 % (ref 0–1)
BILIRUB SERPL-MCNC: 0.3 MG/DL (ref 0.2–1)
BUN SERPL-MCNC: 35 MG/DL (ref 5–25)
CALCIUM SERPL-MCNC: 8 MG/DL (ref 8.3–10.1)
CHLORIDE SERPL-SCNC: 111 MMOL/L (ref 100–108)
CO2 SERPL-SCNC: 25 MMOL/L (ref 21–32)
CREAT SERPL-MCNC: 1.11 MG/DL (ref 0.6–1.3)
CRP SERPL QL: 83.5 MG/L
EOSINOPHIL # BLD MANUAL: 0 THOUSAND/UL (ref 0–0.4)
EOSINOPHIL NFR BLD MANUAL: 0 % (ref 0–6)
ERYTHROCYTE [DISTWIDTH] IN BLOOD BY AUTOMATED COUNT: 15.5 % (ref 11.6–15.1)
GFR SERPL CREATININE-BSD FRML MDRD: 67 ML/MIN/1.73SQ M
GLUCOSE SERPL-MCNC: 185 MG/DL (ref 65–140)
HCT VFR BLD AUTO: 31.2 % (ref 36.5–49.3)
HGB BLD-MCNC: 9.7 G/DL (ref 12–17)
IL6 SERPL-MCNC: 170 PG/ML (ref 0–12.2)
LYMPHOCYTES # BLD AUTO: 0.37 THOUSAND/UL (ref 0.6–4.47)
LYMPHOCYTES # BLD AUTO: 2 % (ref 14–44)
MCH RBC QN AUTO: 28 PG (ref 26.8–34.3)
MCHC RBC AUTO-ENTMCNC: 31.1 G/DL (ref 31.4–37.4)
MCV RBC AUTO: 90 FL (ref 82–98)
MONOCYTES # BLD AUTO: 1.1 THOUSAND/UL (ref 0–1.22)
MONOCYTES NFR BLD: 6 % (ref 4–12)
NEUTROPHILS # BLD MANUAL: 16.87 THOUSAND/UL (ref 1.85–7.62)
NEUTS BAND NFR BLD MANUAL: 6 % (ref 0–8)
NEUTS SEG NFR BLD AUTO: 86 % (ref 43–75)
NRBC BLD AUTO-RTO: 0 /100 WBCS
PLATELET # BLD AUTO: 316 THOUSANDS/UL (ref 149–390)
PLATELET BLD QL SMEAR: ADEQUATE
PMV BLD AUTO: 12 FL (ref 8.9–12.7)
POIKILOCYTOSIS BLD QL SMEAR: PRESENT
POTASSIUM SERPL-SCNC: 3.9 MMOL/L (ref 3.5–5.3)
PROT SERPL-MCNC: 5.8 G/DL (ref 6.4–8.2)
RBC # BLD AUTO: 3.47 MILLION/UL (ref 3.88–5.62)
SODIUM SERPL-SCNC: 146 MMOL/L (ref 136–145)
TOTAL CELLS COUNTED SPEC: 100
WBC # BLD AUTO: 18.34 THOUSAND/UL (ref 4.31–10.16)

## 2020-04-18 PROCEDURE — 99232 SBSQ HOSP IP/OBS MODERATE 35: CPT | Performed by: INTERNAL MEDICINE

## 2020-04-18 PROCEDURE — 85027 COMPLETE CBC AUTOMATED: CPT | Performed by: INTERNAL MEDICINE

## 2020-04-18 PROCEDURE — 94760 N-INVAS EAR/PLS OXIMETRY 1: CPT

## 2020-04-18 PROCEDURE — 86140 C-REACTIVE PROTEIN: CPT | Performed by: INTERNAL MEDICINE

## 2020-04-18 PROCEDURE — 99233 SBSQ HOSP IP/OBS HIGH 50: CPT | Performed by: INTERNAL MEDICINE

## 2020-04-18 PROCEDURE — 85007 BL SMEAR W/DIFF WBC COUNT: CPT | Performed by: INTERNAL MEDICINE

## 2020-04-18 PROCEDURE — 80053 COMPREHEN METABOLIC PANEL: CPT | Performed by: INTERNAL MEDICINE

## 2020-04-18 PROCEDURE — 94003 VENT MGMT INPAT SUBQ DAY: CPT

## 2020-04-18 PROCEDURE — 94762 N-INVAS EAR/PLS OXIMTRY CONT: CPT

## 2020-04-18 RX ORDER — FUROSEMIDE 10 MG/ML
40 INJECTION INTRAMUSCULAR; INTRAVENOUS ONCE
Status: COMPLETED | OUTPATIENT
Start: 2020-04-18 | End: 2020-04-18

## 2020-04-18 RX ADMIN — FUROSEMIDE 40 MG: 10 INJECTION, SOLUTION INTRAMUSCULAR; INTRAVENOUS at 16:06

## 2020-04-18 RX ADMIN — GUAIFENESIN 600 MG: 600 TABLET, EXTENDED RELEASE ORAL at 10:36

## 2020-04-18 RX ADMIN — MORPHINE SULFATE 4 MG: 4 INJECTION INTRAVENOUS at 10:33

## 2020-04-18 RX ADMIN — CLOPIDOGREL BISULFATE 75 MG: 75 TABLET ORAL at 10:37

## 2020-04-18 RX ADMIN — SUCRALFATE 1 G: 1 TABLET ORAL at 15:48

## 2020-04-18 RX ADMIN — ATORVASTATIN CALCIUM 80 MG: 40 TABLET, FILM COATED ORAL at 15:48

## 2020-04-18 RX ADMIN — HYDROXYCHLOROQUINE SULFATE 200 MG: 200 TABLET, FILM COATED ORAL at 15:49

## 2020-04-18 RX ADMIN — ASPIRIN 81 MG 81 MG: 81 TABLET ORAL at 10:36

## 2020-04-18 RX ADMIN — ZINC SULFATE 220 MG (50 MG) CAPSULE 220 MG: CAPSULE at 10:37

## 2020-04-18 RX ADMIN — AMLODIPINE BESYLATE 5 MG: 5 TABLET ORAL at 10:37

## 2020-04-18 RX ADMIN — MORPHINE SULFATE 4 MG: 4 INJECTION INTRAVENOUS at 06:20

## 2020-04-18 RX ADMIN — PANTOPRAZOLE SODIUM 40 MG: 40 TABLET, DELAYED RELEASE ORAL at 10:36

## 2020-04-18 RX ADMIN — ENOXAPARIN SODIUM 40 MG: 40 INJECTION SUBCUTANEOUS at 10:38

## 2020-04-18 RX ADMIN — OXYCODONE HYDROCHLORIDE AND ACETAMINOPHEN 1000 MG: 500 TABLET ORAL at 15:47

## 2020-04-18 RX ADMIN — GUAIFENESIN 600 MG: 600 TABLET, EXTENDED RELEASE ORAL at 15:49

## 2020-04-18 RX ADMIN — MORPHINE SULFATE 4 MG: 4 INJECTION INTRAVENOUS at 01:19

## 2020-04-18 RX ADMIN — HYDROXYCHLOROQUINE SULFATE 200 MG: 200 TABLET, FILM COATED ORAL at 10:37

## 2020-04-18 RX ADMIN — ESCITALOPRAM OXALATE 10 MG: 10 TABLET, FILM COATED ORAL at 10:36

## 2020-04-18 RX ADMIN — TAMSULOSIN HYDROCHLORIDE 0.4 MG: 0.4 CAPSULE ORAL at 15:48

## 2020-04-18 RX ADMIN — CHOLECALCIFEROL TAB 25 MCG (1000 UNIT) 2000 UNITS: 25 TAB at 10:37

## 2020-04-18 RX ADMIN — OXYCODONE HYDROCHLORIDE AND ACETAMINOPHEN 1000 MG: 500 TABLET ORAL at 10:36

## 2020-04-18 RX ADMIN — METHYLPREDNISOLONE SODIUM SUCCINATE 100 MG: 125 INJECTION, POWDER, FOR SOLUTION INTRAMUSCULAR; INTRAVENOUS at 10:38

## 2020-04-19 LAB
ALBUMIN SERPL BCP-MCNC: 2.1 G/DL (ref 3.5–5)
ALP SERPL-CCNC: 71 U/L (ref 46–116)
ALT SERPL W P-5'-P-CCNC: 106 U/L (ref 12–78)
ANION GAP SERPL CALCULATED.3IONS-SCNC: 7 MMOL/L (ref 4–13)
AST SERPL W P-5'-P-CCNC: 67 U/L (ref 5–45)
BASOPHILS # BLD MANUAL: 0 THOUSAND/UL (ref 0–0.1)
BASOPHILS NFR MAR MANUAL: 0 % (ref 0–1)
BILIRUB SERPL-MCNC: 0.29 MG/DL (ref 0.2–1)
BUN SERPL-MCNC: 33 MG/DL (ref 5–25)
CALCIUM SERPL-MCNC: 8.4 MG/DL (ref 8.3–10.1)
CHLORIDE SERPL-SCNC: 115 MMOL/L (ref 100–108)
CO2 SERPL-SCNC: 26 MMOL/L (ref 21–32)
CREAT SERPL-MCNC: 0.98 MG/DL (ref 0.6–1.3)
CRP SERPL QL: 47.5 MG/L
EOSINOPHIL # BLD MANUAL: 0 THOUSAND/UL (ref 0–0.4)
EOSINOPHIL NFR BLD MANUAL: 0 % (ref 0–6)
ERYTHROCYTE [DISTWIDTH] IN BLOOD BY AUTOMATED COUNT: 15.6 % (ref 11.6–15.1)
FERRITIN SERPL-MCNC: 207 NG/ML (ref 8–388)
GFR SERPL CREATININE-BSD FRML MDRD: 78 ML/MIN/1.73SQ M
GLUCOSE SERPL-MCNC: 176 MG/DL (ref 65–140)
HCT VFR BLD AUTO: 32.9 % (ref 36.5–49.3)
HGB BLD-MCNC: 10 G/DL (ref 12–17)
LDH SERPL-CCNC: 504 U/L (ref 81–234)
LYMPHOCYTES # BLD AUTO: 0 % (ref 14–44)
LYMPHOCYTES # BLD AUTO: 0 THOUSAND/UL (ref 0.6–4.47)
MCH RBC QN AUTO: 27.5 PG (ref 26.8–34.3)
MCHC RBC AUTO-ENTMCNC: 30.4 G/DL (ref 31.4–37.4)
MCV RBC AUTO: 90 FL (ref 82–98)
MONOCYTES # BLD AUTO: 1.17 THOUSAND/UL (ref 0–1.22)
MONOCYTES NFR BLD: 7 % (ref 4–12)
NEUTROPHILS # BLD MANUAL: 15.58 THOUSAND/UL (ref 1.85–7.62)
NEUTS BAND NFR BLD MANUAL: 4 % (ref 0–8)
NEUTS SEG NFR BLD AUTO: 89 % (ref 43–75)
NRBC BLD AUTO-RTO: 0 /100 WBCS
PLATELET # BLD AUTO: 348 THOUSANDS/UL (ref 149–390)
PLATELET BLD QL SMEAR: ADEQUATE
PMV BLD AUTO: 11.5 FL (ref 8.9–12.7)
POIKILOCYTOSIS BLD QL SMEAR: PRESENT
POTASSIUM SERPL-SCNC: 4 MMOL/L (ref 3.5–5.3)
PROT SERPL-MCNC: 6.1 G/DL (ref 6.4–8.2)
RBC # BLD AUTO: 3.64 MILLION/UL (ref 3.88–5.62)
SODIUM SERPL-SCNC: 148 MMOL/L (ref 136–145)
TOTAL CELLS COUNTED SPEC: 100
WBC # BLD AUTO: 16.75 THOUSAND/UL (ref 4.31–10.16)

## 2020-04-19 PROCEDURE — 86803 HEPATITIS C AB TEST: CPT | Performed by: INTERNAL MEDICINE

## 2020-04-19 PROCEDURE — 94003 VENT MGMT INPAT SUBQ DAY: CPT

## 2020-04-19 PROCEDURE — 87340 HEPATITIS B SURFACE AG IA: CPT | Performed by: INTERNAL MEDICINE

## 2020-04-19 PROCEDURE — 94762 N-INVAS EAR/PLS OXIMTRY CONT: CPT

## 2020-04-19 PROCEDURE — 87389 HIV-1 AG W/HIV-1&-2 AB AG IA: CPT | Performed by: INTERNAL MEDICINE

## 2020-04-19 PROCEDURE — 99232 SBSQ HOSP IP/OBS MODERATE 35: CPT | Performed by: INTERNAL MEDICINE

## 2020-04-19 PROCEDURE — 86140 C-REACTIVE PROTEIN: CPT | Performed by: INTERNAL MEDICINE

## 2020-04-19 PROCEDURE — 86705 HEP B CORE ANTIBODY IGM: CPT | Performed by: INTERNAL MEDICINE

## 2020-04-19 PROCEDURE — 80053 COMPREHEN METABOLIC PANEL: CPT | Performed by: INTERNAL MEDICINE

## 2020-04-19 PROCEDURE — 82728 ASSAY OF FERRITIN: CPT | Performed by: INTERNAL MEDICINE

## 2020-04-19 PROCEDURE — 85027 COMPLETE CBC AUTOMATED: CPT | Performed by: INTERNAL MEDICINE

## 2020-04-19 PROCEDURE — 94760 N-INVAS EAR/PLS OXIMETRY 1: CPT

## 2020-04-19 PROCEDURE — 86704 HEP B CORE ANTIBODY TOTAL: CPT | Performed by: INTERNAL MEDICINE

## 2020-04-19 PROCEDURE — 85007 BL SMEAR W/DIFF WBC COUNT: CPT | Performed by: INTERNAL MEDICINE

## 2020-04-19 PROCEDURE — 83615 LACTATE (LD) (LDH) ENZYME: CPT | Performed by: INTERNAL MEDICINE

## 2020-04-19 RX ORDER — BISACODYL 10 MG
10 SUPPOSITORY, RECTAL RECTAL DAILY PRN
Status: DISCONTINUED | OUTPATIENT
Start: 2020-04-19 | End: 2020-05-01 | Stop reason: HOSPADM

## 2020-04-19 RX ORDER — FUROSEMIDE 10 MG/ML
40 INJECTION INTRAMUSCULAR; INTRAVENOUS ONCE
Status: COMPLETED | OUTPATIENT
Start: 2020-04-19 | End: 2020-04-19

## 2020-04-19 RX ORDER — AMOXICILLIN 250 MG
1 CAPSULE ORAL
Status: DISCONTINUED | OUTPATIENT
Start: 2020-04-19 | End: 2020-05-01 | Stop reason: HOSPADM

## 2020-04-19 RX ADMIN — OXYCODONE HYDROCHLORIDE AND ACETAMINOPHEN 1000 MG: 500 TABLET ORAL at 18:45

## 2020-04-19 RX ADMIN — OXYCODONE HYDROCHLORIDE AND ACETAMINOPHEN 1000 MG: 500 TABLET ORAL at 09:14

## 2020-04-19 RX ADMIN — CHOLECALCIFEROL TAB 25 MCG (1000 UNIT) 2000 UNITS: 25 TAB at 09:14

## 2020-04-19 RX ADMIN — AMLODIPINE BESYLATE 5 MG: 5 TABLET ORAL at 09:14

## 2020-04-19 RX ADMIN — FUROSEMIDE 40 MG: 10 INJECTION, SOLUTION INTRAMUSCULAR; INTRAVENOUS at 14:26

## 2020-04-19 RX ADMIN — ATORVASTATIN CALCIUM 80 MG: 40 TABLET, FILM COATED ORAL at 18:45

## 2020-04-19 RX ADMIN — ESCITALOPRAM OXALATE 10 MG: 10 TABLET, FILM COATED ORAL at 09:14

## 2020-04-19 RX ADMIN — HYDROXYCHLOROQUINE SULFATE 200 MG: 200 TABLET, FILM COATED ORAL at 18:45

## 2020-04-19 RX ADMIN — TAMSULOSIN HYDROCHLORIDE 0.4 MG: 0.4 CAPSULE ORAL at 18:45

## 2020-04-19 RX ADMIN — TOCILIZUMAB 400 MG: 20 INJECTION, SOLUTION, CONCENTRATE INTRAVENOUS at 14:19

## 2020-04-19 RX ADMIN — METHYLPREDNISOLONE SODIUM SUCCINATE 100 MG: 125 INJECTION, POWDER, FOR SOLUTION INTRAMUSCULAR; INTRAVENOUS at 09:14

## 2020-04-19 RX ADMIN — HYDROXYCHLOROQUINE SULFATE 200 MG: 200 TABLET, FILM COATED ORAL at 09:13

## 2020-04-19 RX ADMIN — SENNOSIDES AND DOCUSATE SODIUM 1 TABLET: 8.6; 5 TABLET ORAL at 21:50

## 2020-04-19 RX ADMIN — PANTOPRAZOLE SODIUM 40 MG: 40 TABLET, DELAYED RELEASE ORAL at 09:14

## 2020-04-19 RX ADMIN — ASPIRIN 81 MG 81 MG: 81 TABLET ORAL at 09:14

## 2020-04-19 RX ADMIN — CLOPIDOGREL BISULFATE 75 MG: 75 TABLET ORAL at 09:13

## 2020-04-19 RX ADMIN — GUAIFENESIN 600 MG: 600 TABLET, EXTENDED RELEASE ORAL at 18:46

## 2020-04-19 RX ADMIN — ENOXAPARIN SODIUM 40 MG: 40 INJECTION SUBCUTANEOUS at 09:14

## 2020-04-19 RX ADMIN — ZINC SULFATE 220 MG (50 MG) CAPSULE 220 MG: CAPSULE at 09:13

## 2020-04-19 RX ADMIN — GUAIFENESIN 600 MG: 600 TABLET, EXTENDED RELEASE ORAL at 09:14

## 2020-04-20 LAB
ALBUMIN SERPL BCP-MCNC: 2.4 G/DL (ref 3.5–5)
ALP SERPL-CCNC: 75 U/L (ref 46–116)
ALT SERPL W P-5'-P-CCNC: 109 U/L (ref 12–78)
ANION GAP SERPL CALCULATED.3IONS-SCNC: 10 MMOL/L (ref 4–13)
AST SERPL W P-5'-P-CCNC: 52 U/L (ref 5–45)
BACTERIA BLD CULT: NORMAL
BACTERIA BLD CULT: NORMAL
BILIRUB SERPL-MCNC: 0.37 MG/DL (ref 0.2–1)
BUN SERPL-MCNC: 33 MG/DL (ref 5–25)
CALCIUM SERPL-MCNC: 8.4 MG/DL (ref 8.3–10.1)
CHLORIDE SERPL-SCNC: 113 MMOL/L (ref 100–108)
CO2 SERPL-SCNC: 28 MMOL/L (ref 21–32)
CREAT SERPL-MCNC: 1 MG/DL (ref 0.6–1.3)
CRP SERPL QL: 31.8 MG/L
FERRITIN SERPL-MCNC: 163 NG/ML (ref 8–388)
GFR SERPL CREATININE-BSD FRML MDRD: 76 ML/MIN/1.73SQ M
GLUCOSE SERPL-MCNC: 162 MG/DL (ref 65–140)
HBV CORE AB SER QL: NORMAL
HBV CORE IGM SER QL: NORMAL
HBV SURFACE AG SER QL: NORMAL
HCV AB SER QL: NORMAL
HIV 1+2 AB+HIV1 P24 AG SERPL QL IA: NORMAL
LDH SERPL-CCNC: 606 U/L (ref 81–234)
POTASSIUM SERPL-SCNC: 3.9 MMOL/L (ref 3.5–5.3)
PROT SERPL-MCNC: 6.2 G/DL (ref 6.4–8.2)
SODIUM SERPL-SCNC: 151 MMOL/L (ref 136–145)

## 2020-04-20 PROCEDURE — 80053 COMPREHEN METABOLIC PANEL: CPT | Performed by: INTERNAL MEDICINE

## 2020-04-20 PROCEDURE — 82728 ASSAY OF FERRITIN: CPT | Performed by: INTERNAL MEDICINE

## 2020-04-20 PROCEDURE — 86140 C-REACTIVE PROTEIN: CPT | Performed by: INTERNAL MEDICINE

## 2020-04-20 PROCEDURE — 99232 SBSQ HOSP IP/OBS MODERATE 35: CPT | Performed by: INTERNAL MEDICINE

## 2020-04-20 PROCEDURE — 94003 VENT MGMT INPAT SUBQ DAY: CPT

## 2020-04-20 PROCEDURE — 94762 N-INVAS EAR/PLS OXIMTRY CONT: CPT

## 2020-04-20 PROCEDURE — 99232 SBSQ HOSP IP/OBS MODERATE 35: CPT | Performed by: NURSE PRACTITIONER

## 2020-04-20 PROCEDURE — 92610 EVALUATE SWALLOWING FUNCTION: CPT

## 2020-04-20 PROCEDURE — 83615 LACTATE (LD) (LDH) ENZYME: CPT | Performed by: INTERNAL MEDICINE

## 2020-04-20 PROCEDURE — 94760 N-INVAS EAR/PLS OXIMETRY 1: CPT

## 2020-04-20 RX ORDER — DEXTROSE MONOHYDRATE 50 MG/ML
50 INJECTION, SOLUTION INTRAVENOUS CONTINUOUS
Status: DISCONTINUED | OUTPATIENT
Start: 2020-04-20 | End: 2020-04-21

## 2020-04-20 RX ORDER — HYDROXYCHLOROQUINE SULFATE 200 MG/1
200 TABLET, FILM COATED ORAL 2 TIMES DAILY
Status: DISCONTINUED | OUTPATIENT
Start: 2020-04-20 | End: 2020-04-22

## 2020-04-20 RX ADMIN — PANTOPRAZOLE SODIUM 40 MG: 40 TABLET, DELAYED RELEASE ORAL at 08:17

## 2020-04-20 RX ADMIN — SUCRALFATE 1 G: 1 TABLET ORAL at 12:12

## 2020-04-20 RX ADMIN — ZINC SULFATE 220 MG (50 MG) CAPSULE 220 MG: CAPSULE at 08:18

## 2020-04-20 RX ADMIN — CLOPIDOGREL BISULFATE 75 MG: 75 TABLET ORAL at 08:18

## 2020-04-20 RX ADMIN — ESCITALOPRAM OXALATE 10 MG: 10 TABLET, FILM COATED ORAL at 08:18

## 2020-04-20 RX ADMIN — HYDROXYCHLOROQUINE SULFATE 200 MG: 200 TABLET, FILM COATED ORAL at 18:03

## 2020-04-20 RX ADMIN — ATORVASTATIN CALCIUM 80 MG: 40 TABLET, FILM COATED ORAL at 18:03

## 2020-04-20 RX ADMIN — DEXTROSE 50 ML/HR: 5 SOLUTION INTRAVENOUS at 10:09

## 2020-04-20 RX ADMIN — SUCRALFATE 1 G: 1 TABLET ORAL at 18:03

## 2020-04-20 RX ADMIN — BISACODYL 10 MG: 10 SUPPOSITORY RECTAL at 13:22

## 2020-04-20 RX ADMIN — TAMSULOSIN HYDROCHLORIDE 0.4 MG: 0.4 CAPSULE ORAL at 18:02

## 2020-04-20 RX ADMIN — OXYCODONE HYDROCHLORIDE AND ACETAMINOPHEN 1000 MG: 500 TABLET ORAL at 18:03

## 2020-04-20 RX ADMIN — CHOLECALCIFEROL TAB 25 MCG (1000 UNIT) 2000 UNITS: 25 TAB at 08:17

## 2020-04-20 RX ADMIN — DEXTROSE 50 ML/HR: 5 SOLUTION INTRAVENOUS at 18:02

## 2020-04-20 RX ADMIN — ENOXAPARIN SODIUM 40 MG: 40 INJECTION SUBCUTANEOUS at 08:17

## 2020-04-20 RX ADMIN — HYDROXYCHLOROQUINE SULFATE 200 MG: 200 TABLET, FILM COATED ORAL at 08:17

## 2020-04-20 RX ADMIN — OXYCODONE HYDROCHLORIDE AND ACETAMINOPHEN 1000 MG: 500 TABLET ORAL at 08:17

## 2020-04-20 RX ADMIN — METHYLPREDNISOLONE SODIUM SUCCINATE 100 MG: 125 INJECTION, POWDER, FOR SOLUTION INTRAMUSCULAR; INTRAVENOUS at 08:17

## 2020-04-20 RX ADMIN — AMLODIPINE BESYLATE 5 MG: 5 TABLET ORAL at 08:18

## 2020-04-20 RX ADMIN — SENNOSIDES AND DOCUSATE SODIUM 1 TABLET: 8.6; 5 TABLET ORAL at 21:23

## 2020-04-20 RX ADMIN — GUAIFENESIN 600 MG: 600 TABLET, EXTENDED RELEASE ORAL at 08:18

## 2020-04-20 RX ADMIN — DEXTROSE 50 ML/HR: 5 SOLUTION INTRAVENOUS at 23:14

## 2020-04-20 RX ADMIN — ASPIRIN 81 MG 81 MG: 81 TABLET ORAL at 08:17

## 2020-04-20 RX ADMIN — SUCRALFATE 1 G: 1 TABLET ORAL at 06:03

## 2020-04-21 LAB
ALBUMIN SERPL BCP-MCNC: 2.1 G/DL (ref 3.5–5)
ALP SERPL-CCNC: 71 U/L (ref 46–116)
ALT SERPL W P-5'-P-CCNC: 107 U/L (ref 12–78)
ANION GAP SERPL CALCULATED.3IONS-SCNC: 6 MMOL/L (ref 4–13)
AST SERPL W P-5'-P-CCNC: 49 U/L (ref 5–45)
BASOPHILS # BLD AUTO: 0.02 THOUSANDS/ΜL (ref 0–0.1)
BASOPHILS NFR BLD AUTO: 0 % (ref 0–1)
BILIRUB SERPL-MCNC: 0.33 MG/DL (ref 0.2–1)
BUN SERPL-MCNC: 26 MG/DL (ref 5–25)
CALCIUM SERPL-MCNC: 7.8 MG/DL (ref 8.3–10.1)
CHLORIDE SERPL-SCNC: 108 MMOL/L (ref 100–108)
CO2 SERPL-SCNC: 27 MMOL/L (ref 21–32)
CREAT SERPL-MCNC: 0.85 MG/DL (ref 0.6–1.3)
CRP SERPL QL: 14.9 MG/L
EOSINOPHIL # BLD AUTO: 0 THOUSAND/ΜL (ref 0–0.61)
EOSINOPHIL NFR BLD AUTO: 0 % (ref 0–6)
ERYTHROCYTE [DISTWIDTH] IN BLOOD BY AUTOMATED COUNT: 15.5 % (ref 11.6–15.1)
FERRITIN SERPL-MCNC: 115 NG/ML (ref 8–388)
GFR SERPL CREATININE-BSD FRML MDRD: 88 ML/MIN/1.73SQ M
GLUCOSE SERPL-MCNC: 164 MG/DL (ref 65–140)
HCT VFR BLD AUTO: 31 % (ref 36.5–49.3)
HGB BLD-MCNC: 9.6 G/DL (ref 12–17)
IMM GRANULOCYTES # BLD AUTO: 0.16 THOUSAND/UL (ref 0–0.2)
IMM GRANULOCYTES NFR BLD AUTO: 1 % (ref 0–2)
INR PPP: 1.28 (ref 0.84–1.19)
LDH SERPL-CCNC: 495 U/L (ref 81–234)
LYMPHOCYTES # BLD AUTO: 1.06 THOUSANDS/ΜL (ref 0.6–4.47)
LYMPHOCYTES NFR BLD AUTO: 7 % (ref 14–44)
MCH RBC QN AUTO: 27.6 PG (ref 26.8–34.3)
MCHC RBC AUTO-ENTMCNC: 31 G/DL (ref 31.4–37.4)
MCV RBC AUTO: 89 FL (ref 82–98)
MONOCYTES # BLD AUTO: 1.05 THOUSAND/ΜL (ref 0.17–1.22)
MONOCYTES NFR BLD AUTO: 7 % (ref 4–12)
NEUTROPHILS # BLD AUTO: 13.4 THOUSANDS/ΜL (ref 1.85–7.62)
NEUTS SEG NFR BLD AUTO: 85 % (ref 43–75)
NRBC BLD AUTO-RTO: 0 /100 WBCS
PLATELET # BLD AUTO: 434 THOUSANDS/UL (ref 149–390)
PMV BLD AUTO: 11.4 FL (ref 8.9–12.7)
POTASSIUM SERPL-SCNC: 4.1 MMOL/L (ref 3.5–5.3)
PROT SERPL-MCNC: 5.6 G/DL (ref 6.4–8.2)
PROTHROMBIN TIME: 15.3 SECONDS (ref 11.6–14.5)
RBC # BLD AUTO: 3.48 MILLION/UL (ref 3.88–5.62)
SODIUM SERPL-SCNC: 141 MMOL/L (ref 136–145)
WBC # BLD AUTO: 15.69 THOUSAND/UL (ref 4.31–10.16)

## 2020-04-21 PROCEDURE — 85610 PROTHROMBIN TIME: CPT | Performed by: INTERNAL MEDICINE

## 2020-04-21 PROCEDURE — 99232 SBSQ HOSP IP/OBS MODERATE 35: CPT | Performed by: HOSPITALIST

## 2020-04-21 PROCEDURE — 83520 IMMUNOASSAY QUANT NOS NONAB: CPT | Performed by: INTERNAL MEDICINE

## 2020-04-21 PROCEDURE — 82728 ASSAY OF FERRITIN: CPT | Performed by: INTERNAL MEDICINE

## 2020-04-21 PROCEDURE — 99232 SBSQ HOSP IP/OBS MODERATE 35: CPT | Performed by: INTERNAL MEDICINE

## 2020-04-21 PROCEDURE — 80053 COMPREHEN METABOLIC PANEL: CPT | Performed by: INTERNAL MEDICINE

## 2020-04-21 PROCEDURE — 83615 LACTATE (LD) (LDH) ENZYME: CPT | Performed by: INTERNAL MEDICINE

## 2020-04-21 PROCEDURE — 94003 VENT MGMT INPAT SUBQ DAY: CPT

## 2020-04-21 PROCEDURE — 85025 COMPLETE CBC W/AUTO DIFF WBC: CPT | Performed by: INTERNAL MEDICINE

## 2020-04-21 PROCEDURE — 94762 N-INVAS EAR/PLS OXIMTRY CONT: CPT

## 2020-04-21 PROCEDURE — 94760 N-INVAS EAR/PLS OXIMETRY 1: CPT

## 2020-04-21 PROCEDURE — 86140 C-REACTIVE PROTEIN: CPT | Performed by: INTERNAL MEDICINE

## 2020-04-21 RX ADMIN — HYDROXYCHLOROQUINE SULFATE 200 MG: 200 TABLET, FILM COATED ORAL at 08:15

## 2020-04-21 RX ADMIN — TAMSULOSIN HYDROCHLORIDE 0.4 MG: 0.4 CAPSULE ORAL at 16:52

## 2020-04-21 RX ADMIN — AMLODIPINE BESYLATE 5 MG: 5 TABLET ORAL at 08:15

## 2020-04-21 RX ADMIN — CHOLECALCIFEROL TAB 25 MCG (1000 UNIT) 2000 UNITS: 25 TAB at 08:15

## 2020-04-21 RX ADMIN — SUCRALFATE 1 G: 1 TABLET ORAL at 08:15

## 2020-04-21 RX ADMIN — CLOPIDOGREL BISULFATE 75 MG: 75 TABLET ORAL at 08:15

## 2020-04-21 RX ADMIN — ASPIRIN 81 MG 81 MG: 81 TABLET ORAL at 08:15

## 2020-04-21 RX ADMIN — ZINC SULFATE 220 MG (50 MG) CAPSULE 220 MG: CAPSULE at 08:15

## 2020-04-21 RX ADMIN — OXYCODONE HYDROCHLORIDE AND ACETAMINOPHEN 1000 MG: 500 TABLET ORAL at 16:53

## 2020-04-21 RX ADMIN — SUCRALFATE 1 G: 1 TABLET ORAL at 16:52

## 2020-04-21 RX ADMIN — ENOXAPARIN SODIUM 40 MG: 40 INJECTION SUBCUTANEOUS at 08:16

## 2020-04-21 RX ADMIN — METHYLPREDNISOLONE SODIUM SUCCINATE 100 MG: 125 INJECTION, POWDER, FOR SOLUTION INTRAMUSCULAR; INTRAVENOUS at 08:15

## 2020-04-21 RX ADMIN — SUCRALFATE 1 G: 1 TABLET ORAL at 11:57

## 2020-04-21 RX ADMIN — HYDROXYCHLOROQUINE SULFATE 200 MG: 200 TABLET, FILM COATED ORAL at 16:53

## 2020-04-21 RX ADMIN — PANTOPRAZOLE SODIUM 40 MG: 40 TABLET, DELAYED RELEASE ORAL at 08:15

## 2020-04-21 RX ADMIN — ESCITALOPRAM OXALATE 10 MG: 10 TABLET, FILM COATED ORAL at 08:15

## 2020-04-21 RX ADMIN — ATORVASTATIN CALCIUM 80 MG: 40 TABLET, FILM COATED ORAL at 16:52

## 2020-04-21 RX ADMIN — OXYCODONE HYDROCHLORIDE AND ACETAMINOPHEN 1000 MG: 500 TABLET ORAL at 08:15

## 2020-04-21 RX ADMIN — SENNOSIDES AND DOCUSATE SODIUM 1 TABLET: 8.6; 5 TABLET ORAL at 21:02

## 2020-04-22 LAB
ANION GAP SERPL CALCULATED.3IONS-SCNC: 7 MMOL/L (ref 4–13)
BUN SERPL-MCNC: 21 MG/DL (ref 5–25)
CALCIUM SERPL-MCNC: 7.8 MG/DL (ref 8.3–10.1)
CHLORIDE SERPL-SCNC: 109 MMOL/L (ref 100–108)
CO2 SERPL-SCNC: 25 MMOL/L (ref 21–32)
CREAT SERPL-MCNC: 0.75 MG/DL (ref 0.6–1.3)
ERYTHROCYTE [DISTWIDTH] IN BLOOD BY AUTOMATED COUNT: 15.3 % (ref 11.6–15.1)
GFR SERPL CREATININE-BSD FRML MDRD: 93 ML/MIN/1.73SQ M
GLUCOSE SERPL-MCNC: 92 MG/DL (ref 65–140)
HCT VFR BLD AUTO: 31.8 % (ref 36.5–49.3)
HGB BLD-MCNC: 9.9 G/DL (ref 12–17)
MCH RBC QN AUTO: 28 PG (ref 26.8–34.3)
MCHC RBC AUTO-ENTMCNC: 31.1 G/DL (ref 31.4–37.4)
MCV RBC AUTO: 90 FL (ref 82–98)
PLATELET # BLD AUTO: 431 THOUSANDS/UL (ref 149–390)
PMV BLD AUTO: 11.4 FL (ref 8.9–12.7)
POTASSIUM SERPL-SCNC: 4.5 MMOL/L (ref 3.5–5.3)
RBC # BLD AUTO: 3.53 MILLION/UL (ref 3.88–5.62)
SODIUM SERPL-SCNC: 141 MMOL/L (ref 136–145)
WBC # BLD AUTO: 14.72 THOUSAND/UL (ref 4.31–10.16)

## 2020-04-22 PROCEDURE — 94762 N-INVAS EAR/PLS OXIMTRY CONT: CPT

## 2020-04-22 PROCEDURE — 99232 SBSQ HOSP IP/OBS MODERATE 35: CPT | Performed by: HOSPITALIST

## 2020-04-22 PROCEDURE — 94760 N-INVAS EAR/PLS OXIMETRY 1: CPT

## 2020-04-22 PROCEDURE — 99232 SBSQ HOSP IP/OBS MODERATE 35: CPT | Performed by: INTERNAL MEDICINE

## 2020-04-22 PROCEDURE — 85027 COMPLETE CBC AUTOMATED: CPT | Performed by: INTERNAL MEDICINE

## 2020-04-22 PROCEDURE — 80048 BASIC METABOLIC PNL TOTAL CA: CPT | Performed by: INTERNAL MEDICINE

## 2020-04-22 PROCEDURE — 99232 SBSQ HOSP IP/OBS MODERATE 35: CPT | Performed by: NURSE PRACTITIONER

## 2020-04-22 RX ADMIN — SENNOSIDES AND DOCUSATE SODIUM 1 TABLET: 8.6; 5 TABLET ORAL at 22:32

## 2020-04-22 RX ADMIN — ESCITALOPRAM OXALATE 10 MG: 10 TABLET, FILM COATED ORAL at 08:49

## 2020-04-22 RX ADMIN — HYDROXYCHLOROQUINE SULFATE 200 MG: 200 TABLET, FILM COATED ORAL at 08:49

## 2020-04-22 RX ADMIN — SUCRALFATE 1 G: 1 TABLET ORAL at 16:36

## 2020-04-22 RX ADMIN — SUCRALFATE 1 G: 1 TABLET ORAL at 11:37

## 2020-04-22 RX ADMIN — Medication 1 TABLET: at 08:56

## 2020-04-22 RX ADMIN — METHYLPREDNISOLONE SODIUM SUCCINATE 100 MG: 125 INJECTION, POWDER, FOR SOLUTION INTRAMUSCULAR; INTRAVENOUS at 08:49

## 2020-04-22 RX ADMIN — CHOLECALCIFEROL TAB 25 MCG (1000 UNIT) 2000 UNITS: 25 TAB at 08:49

## 2020-04-22 RX ADMIN — PANTOPRAZOLE SODIUM 40 MG: 40 TABLET, DELAYED RELEASE ORAL at 08:49

## 2020-04-22 RX ADMIN — AMLODIPINE BESYLATE 5 MG: 5 TABLET ORAL at 08:46

## 2020-04-22 RX ADMIN — ENOXAPARIN SODIUM 40 MG: 40 INJECTION SUBCUTANEOUS at 08:49

## 2020-04-22 RX ADMIN — SUCRALFATE 1 G: 1 TABLET ORAL at 06:05

## 2020-04-22 RX ADMIN — ATORVASTATIN CALCIUM 80 MG: 40 TABLET, FILM COATED ORAL at 16:36

## 2020-04-22 RX ADMIN — TAMSULOSIN HYDROCHLORIDE 0.4 MG: 0.4 CAPSULE ORAL at 16:36

## 2020-04-22 RX ADMIN — ASPIRIN 81 MG 81 MG: 81 TABLET ORAL at 08:49

## 2020-04-22 RX ADMIN — CLOPIDOGREL BISULFATE 75 MG: 75 TABLET ORAL at 08:49

## 2020-04-23 LAB
ANION GAP SERPL CALCULATED.3IONS-SCNC: 4 MMOL/L (ref 4–13)
BUN SERPL-MCNC: 22 MG/DL (ref 5–25)
CALCIUM SERPL-MCNC: 7.9 MG/DL (ref 8.3–10.1)
CHLORIDE SERPL-SCNC: 108 MMOL/L (ref 100–108)
CO2 SERPL-SCNC: 29 MMOL/L (ref 21–32)
CREAT SERPL-MCNC: 0.92 MG/DL (ref 0.6–1.3)
CRP SERPL QL: 7.5 MG/L
FERRITIN SERPL-MCNC: 93 NG/ML (ref 8–388)
GFR SERPL CREATININE-BSD FRML MDRD: 84 ML/MIN/1.73SQ M
GLUCOSE SERPL-MCNC: 133 MG/DL (ref 65–140)
LDH SERPL-CCNC: 458 U/L (ref 81–234)
POTASSIUM SERPL-SCNC: 4.2 MMOL/L (ref 3.5–5.3)
SODIUM SERPL-SCNC: 141 MMOL/L (ref 136–145)

## 2020-04-23 PROCEDURE — 83615 LACTATE (LD) (LDH) ENZYME: CPT | Performed by: HOSPITALIST

## 2020-04-23 PROCEDURE — 99232 SBSQ HOSP IP/OBS MODERATE 35: CPT | Performed by: INTERNAL MEDICINE

## 2020-04-23 PROCEDURE — 80048 BASIC METABOLIC PNL TOTAL CA: CPT | Performed by: INTERNAL MEDICINE

## 2020-04-23 PROCEDURE — 99233 SBSQ HOSP IP/OBS HIGH 50: CPT | Performed by: NURSE PRACTITIONER

## 2020-04-23 PROCEDURE — 99233 SBSQ HOSP IP/OBS HIGH 50: CPT | Performed by: INTERNAL MEDICINE

## 2020-04-23 PROCEDURE — 94762 N-INVAS EAR/PLS OXIMTRY CONT: CPT

## 2020-04-23 PROCEDURE — 99232 SBSQ HOSP IP/OBS MODERATE 35: CPT | Performed by: HOSPITALIST

## 2020-04-23 PROCEDURE — 94760 N-INVAS EAR/PLS OXIMETRY 1: CPT

## 2020-04-23 PROCEDURE — 94003 VENT MGMT INPAT SUBQ DAY: CPT

## 2020-04-23 PROCEDURE — 86140 C-REACTIVE PROTEIN: CPT | Performed by: HOSPITALIST

## 2020-04-23 PROCEDURE — 82728 ASSAY OF FERRITIN: CPT | Performed by: HOSPITALIST

## 2020-04-23 RX ORDER — METHYLPREDNISOLONE SODIUM SUCCINATE 40 MG/ML
40 INJECTION, POWDER, LYOPHILIZED, FOR SOLUTION INTRAMUSCULAR; INTRAVENOUS EVERY 12 HOURS SCHEDULED
Status: DISCONTINUED | OUTPATIENT
Start: 2020-04-23 | End: 2020-04-27

## 2020-04-23 RX ADMIN — ATORVASTATIN CALCIUM 80 MG: 40 TABLET, FILM COATED ORAL at 16:00

## 2020-04-23 RX ADMIN — ESCITALOPRAM OXALATE 10 MG: 10 TABLET, FILM COATED ORAL at 09:13

## 2020-04-23 RX ADMIN — SUCRALFATE 1 G: 1 TABLET ORAL at 11:41

## 2020-04-23 RX ADMIN — ENOXAPARIN SODIUM 40 MG: 40 INJECTION SUBCUTANEOUS at 09:13

## 2020-04-23 RX ADMIN — PANTOPRAZOLE SODIUM 40 MG: 40 TABLET, DELAYED RELEASE ORAL at 09:14

## 2020-04-23 RX ADMIN — CLOPIDOGREL BISULFATE 75 MG: 75 TABLET ORAL at 09:14

## 2020-04-23 RX ADMIN — ASPIRIN 81 MG 81 MG: 81 TABLET ORAL at 09:13

## 2020-04-23 RX ADMIN — METHYLPREDNISOLONE SODIUM SUCCINATE 100 MG: 125 INJECTION, POWDER, FOR SOLUTION INTRAMUSCULAR; INTRAVENOUS at 09:14

## 2020-04-23 RX ADMIN — Medication 1 TABLET: at 09:13

## 2020-04-23 RX ADMIN — SUCRALFATE 1 G: 1 TABLET ORAL at 16:00

## 2020-04-23 RX ADMIN — CHOLECALCIFEROL TAB 25 MCG (1000 UNIT) 2000 UNITS: 25 TAB at 09:13

## 2020-04-23 RX ADMIN — METHYLPREDNISOLONE SODIUM SUCCINATE 40 MG: 40 INJECTION, POWDER, FOR SOLUTION INTRAMUSCULAR; INTRAVENOUS at 21:36

## 2020-04-23 RX ADMIN — TAMSULOSIN HYDROCHLORIDE 0.4 MG: 0.4 CAPSULE ORAL at 16:00

## 2020-04-23 RX ADMIN — SUCRALFATE 1 G: 1 TABLET ORAL at 05:23

## 2020-04-24 LAB
CRP SERPL QL: 5 MG/L
FERRITIN SERPL-MCNC: 85 NG/ML (ref 8–388)
IL6 SERPL-MCNC: 337.7 PG/ML (ref 0–15.5)

## 2020-04-24 PROCEDURE — 99231 SBSQ HOSP IP/OBS SF/LOW 25: CPT | Performed by: NURSE PRACTITIONER

## 2020-04-24 PROCEDURE — 94003 VENT MGMT INPAT SUBQ DAY: CPT

## 2020-04-24 PROCEDURE — 99232 SBSQ HOSP IP/OBS MODERATE 35: CPT | Performed by: INTERNAL MEDICINE

## 2020-04-24 PROCEDURE — 99232 SBSQ HOSP IP/OBS MODERATE 35: CPT | Performed by: HOSPITALIST

## 2020-04-24 PROCEDURE — 86140 C-REACTIVE PROTEIN: CPT | Performed by: INTERNAL MEDICINE

## 2020-04-24 PROCEDURE — 94760 N-INVAS EAR/PLS OXIMETRY 1: CPT

## 2020-04-24 PROCEDURE — 82728 ASSAY OF FERRITIN: CPT | Performed by: INTERNAL MEDICINE

## 2020-04-24 PROCEDURE — 94762 N-INVAS EAR/PLS OXIMTRY CONT: CPT

## 2020-04-24 RX ORDER — POLYETHYLENE GLYCOL 3350 17 G/17G
17 POWDER, FOR SOLUTION ORAL DAILY
Status: DISCONTINUED | OUTPATIENT
Start: 2020-04-25 | End: 2020-04-27

## 2020-04-24 RX ADMIN — SUCRALFATE 1 G: 1 TABLET ORAL at 17:47

## 2020-04-24 RX ADMIN — ATORVASTATIN CALCIUM 80 MG: 40 TABLET, FILM COATED ORAL at 17:47

## 2020-04-24 RX ADMIN — ENOXAPARIN SODIUM 105 MG: 120 INJECTION, SOLUTION INTRAVENOUS; SUBCUTANEOUS at 21:30

## 2020-04-24 RX ADMIN — TOCILIZUMAB 400 MG: 20 INJECTION, SOLUTION, CONCENTRATE INTRAVENOUS at 17:46

## 2020-04-24 RX ADMIN — TAMSULOSIN HYDROCHLORIDE 0.4 MG: 0.4 CAPSULE ORAL at 17:47

## 2020-04-24 RX ADMIN — ENOXAPARIN SODIUM 40 MG: 40 INJECTION SUBCUTANEOUS at 08:45

## 2020-04-24 RX ADMIN — METHYLPREDNISOLONE SODIUM SUCCINATE 40 MG: 40 INJECTION, POWDER, FOR SOLUTION INTRAMUSCULAR; INTRAVENOUS at 21:30

## 2020-04-24 RX ADMIN — METHYLPREDNISOLONE SODIUM SUCCINATE 40 MG: 40 INJECTION, POWDER, FOR SOLUTION INTRAMUSCULAR; INTRAVENOUS at 08:45

## 2020-04-25 LAB
CRP SERPL QL: 3.4 MG/L
D DIMER PPP FEU-MCNC: 1.83 UG/ML FEU
FERRITIN SERPL-MCNC: 72 NG/ML (ref 8–388)
NT-PROBNP SERPL-MCNC: 267 PG/ML

## 2020-04-25 PROCEDURE — 86140 C-REACTIVE PROTEIN: CPT | Performed by: INTERNAL MEDICINE

## 2020-04-25 PROCEDURE — 99232 SBSQ HOSP IP/OBS MODERATE 35: CPT | Performed by: HOSPITALIST

## 2020-04-25 PROCEDURE — 85379 FIBRIN DEGRADATION QUANT: CPT | Performed by: INTERNAL MEDICINE

## 2020-04-25 PROCEDURE — 83880 ASSAY OF NATRIURETIC PEPTIDE: CPT | Performed by: INTERNAL MEDICINE

## 2020-04-25 PROCEDURE — 94003 VENT MGMT INPAT SUBQ DAY: CPT

## 2020-04-25 PROCEDURE — 82728 ASSAY OF FERRITIN: CPT | Performed by: INTERNAL MEDICINE

## 2020-04-25 PROCEDURE — 99232 SBSQ HOSP IP/OBS MODERATE 35: CPT | Performed by: INTERNAL MEDICINE

## 2020-04-25 PROCEDURE — 94760 N-INVAS EAR/PLS OXIMETRY 1: CPT

## 2020-04-25 PROCEDURE — 94762 N-INVAS EAR/PLS OXIMTRY CONT: CPT

## 2020-04-25 RX ADMIN — SUCRALFATE 1 G: 1 TABLET ORAL at 05:24

## 2020-04-25 RX ADMIN — PANTOPRAZOLE SODIUM 40 MG: 40 TABLET, DELAYED RELEASE ORAL at 10:49

## 2020-04-25 RX ADMIN — ENOXAPARIN SODIUM 105 MG: 120 INJECTION, SOLUTION INTRAVENOUS; SUBCUTANEOUS at 10:49

## 2020-04-25 RX ADMIN — CHOLECALCIFEROL TAB 25 MCG (1000 UNIT) 2000 UNITS: 25 TAB at 10:49

## 2020-04-25 RX ADMIN — SUCRALFATE 1 G: 1 TABLET ORAL at 18:16

## 2020-04-25 RX ADMIN — ASPIRIN 81 MG 81 MG: 81 TABLET ORAL at 10:50

## 2020-04-25 RX ADMIN — AMLODIPINE BESYLATE 5 MG: 5 TABLET ORAL at 10:50

## 2020-04-25 RX ADMIN — CLOPIDOGREL BISULFATE 75 MG: 75 TABLET ORAL at 10:49

## 2020-04-25 RX ADMIN — TAMSULOSIN HYDROCHLORIDE 0.4 MG: 0.4 CAPSULE ORAL at 18:16

## 2020-04-25 RX ADMIN — METHYLPREDNISOLONE SODIUM SUCCINATE 40 MG: 40 INJECTION, POWDER, FOR SOLUTION INTRAMUSCULAR; INTRAVENOUS at 21:02

## 2020-04-25 RX ADMIN — SUCRALFATE 1 G: 1 TABLET ORAL at 10:49

## 2020-04-25 RX ADMIN — ENOXAPARIN SODIUM 105 MG: 120 INJECTION, SOLUTION INTRAVENOUS; SUBCUTANEOUS at 21:02

## 2020-04-25 RX ADMIN — ATORVASTATIN CALCIUM 80 MG: 40 TABLET, FILM COATED ORAL at 18:15

## 2020-04-25 RX ADMIN — METHYLPREDNISOLONE SODIUM SUCCINATE 40 MG: 40 INJECTION, POWDER, FOR SOLUTION INTRAMUSCULAR; INTRAVENOUS at 10:49

## 2020-04-25 RX ADMIN — ESCITALOPRAM OXALATE 10 MG: 10 TABLET, FILM COATED ORAL at 10:50

## 2020-04-25 RX ADMIN — Medication 1 TABLET: at 10:49

## 2020-04-26 LAB
ANION GAP SERPL CALCULATED.3IONS-SCNC: 6 MMOL/L (ref 4–13)
BASOPHILS # BLD MANUAL: 0 THOUSAND/UL (ref 0–0.1)
BASOPHILS NFR MAR MANUAL: 0 % (ref 0–1)
BUN SERPL-MCNC: 23 MG/DL (ref 5–25)
CALCIUM SERPL-MCNC: 8 MG/DL (ref 8.3–10.1)
CHLORIDE SERPL-SCNC: 109 MMOL/L (ref 100–108)
CO2 SERPL-SCNC: 27 MMOL/L (ref 21–32)
CREAT SERPL-MCNC: 0.85 MG/DL (ref 0.6–1.3)
EOSINOPHIL # BLD MANUAL: 0 THOUSAND/UL (ref 0–0.4)
EOSINOPHIL NFR BLD MANUAL: 0 % (ref 0–6)
ERYTHROCYTE [DISTWIDTH] IN BLOOD BY AUTOMATED COUNT: 16.3 % (ref 11.6–15.1)
GFR SERPL CREATININE-BSD FRML MDRD: 88 ML/MIN/1.73SQ M
GLUCOSE SERPL-MCNC: 172 MG/DL (ref 65–140)
HCT VFR BLD AUTO: 32.3 % (ref 36.5–49.3)
HGB BLD-MCNC: 10.1 G/DL (ref 12–17)
LG PLATELETS BLD QL SMEAR: PRESENT
LYMPHOCYTES # BLD AUTO: 0.9 THOUSAND/UL (ref 0.6–4.47)
LYMPHOCYTES # BLD AUTO: 4 % (ref 14–44)
MCH RBC QN AUTO: 28.3 PG (ref 26.8–34.3)
MCHC RBC AUTO-ENTMCNC: 31.3 G/DL (ref 31.4–37.4)
MCV RBC AUTO: 91 FL (ref 82–98)
MONOCYTES # BLD AUTO: 0.45 THOUSAND/UL (ref 0–1.22)
MONOCYTES NFR BLD: 2 % (ref 4–12)
NEUTROPHILS # BLD MANUAL: 21.12 THOUSAND/UL (ref 1.85–7.62)
NEUTS SEG NFR BLD AUTO: 94 % (ref 43–75)
NRBC BLD AUTO-RTO: 0 /100 WBCS
PLATELET # BLD AUTO: 546 THOUSANDS/UL (ref 149–390)
PLATELET BLD QL SMEAR: ABNORMAL
PMV BLD AUTO: 11.7 FL (ref 8.9–12.7)
POIKILOCYTOSIS BLD QL SMEAR: PRESENT
POTASSIUM SERPL-SCNC: 4.7 MMOL/L (ref 3.5–5.3)
RBC # BLD AUTO: 3.57 MILLION/UL (ref 3.88–5.62)
SODIUM SERPL-SCNC: 142 MMOL/L (ref 136–145)
TOTAL CELLS COUNTED SPEC: 100
WBC # BLD AUTO: 22.47 THOUSAND/UL (ref 4.31–10.16)

## 2020-04-26 PROCEDURE — 94760 N-INVAS EAR/PLS OXIMETRY 1: CPT

## 2020-04-26 PROCEDURE — 94762 N-INVAS EAR/PLS OXIMTRY CONT: CPT

## 2020-04-26 PROCEDURE — 94003 VENT MGMT INPAT SUBQ DAY: CPT

## 2020-04-26 PROCEDURE — 99232 SBSQ HOSP IP/OBS MODERATE 35: CPT | Performed by: HOSPITALIST

## 2020-04-26 PROCEDURE — 80048 BASIC METABOLIC PNL TOTAL CA: CPT | Performed by: INTERNAL MEDICINE

## 2020-04-26 PROCEDURE — 85007 BL SMEAR W/DIFF WBC COUNT: CPT | Performed by: INTERNAL MEDICINE

## 2020-04-26 PROCEDURE — 85027 COMPLETE CBC AUTOMATED: CPT | Performed by: INTERNAL MEDICINE

## 2020-04-26 RX ADMIN — ENOXAPARIN SODIUM 105 MG: 120 INJECTION, SOLUTION INTRAVENOUS; SUBCUTANEOUS at 21:51

## 2020-04-26 RX ADMIN — PANTOPRAZOLE SODIUM 40 MG: 40 TABLET, DELAYED RELEASE ORAL at 09:50

## 2020-04-26 RX ADMIN — POLYETHYLENE GLYCOL 3350 17 G: 17 POWDER, FOR SOLUTION ORAL at 09:51

## 2020-04-26 RX ADMIN — ATORVASTATIN CALCIUM 80 MG: 40 TABLET, FILM COATED ORAL at 17:10

## 2020-04-26 RX ADMIN — ASPIRIN 81 MG 81 MG: 81 TABLET ORAL at 09:50

## 2020-04-26 RX ADMIN — SUCRALFATE 1 G: 1 TABLET ORAL at 17:09

## 2020-04-26 RX ADMIN — ESCITALOPRAM OXALATE 10 MG: 10 TABLET, FILM COATED ORAL at 09:50

## 2020-04-26 RX ADMIN — CHOLECALCIFEROL TAB 25 MCG (1000 UNIT) 2000 UNITS: 25 TAB at 09:51

## 2020-04-26 RX ADMIN — CLOPIDOGREL BISULFATE 75 MG: 75 TABLET ORAL at 09:50

## 2020-04-26 RX ADMIN — ENOXAPARIN SODIUM 105 MG: 120 INJECTION, SOLUTION INTRAVENOUS; SUBCUTANEOUS at 09:50

## 2020-04-26 RX ADMIN — TAMSULOSIN HYDROCHLORIDE 0.4 MG: 0.4 CAPSULE ORAL at 17:10

## 2020-04-26 RX ADMIN — METHYLPREDNISOLONE SODIUM SUCCINATE 40 MG: 40 INJECTION, POWDER, FOR SOLUTION INTRAMUSCULAR; INTRAVENOUS at 21:51

## 2020-04-26 RX ADMIN — SUCRALFATE 1 G: 1 TABLET ORAL at 06:16

## 2020-04-26 RX ADMIN — METHYLPREDNISOLONE SODIUM SUCCINATE 40 MG: 40 INJECTION, POWDER, FOR SOLUTION INTRAMUSCULAR; INTRAVENOUS at 09:50

## 2020-04-26 RX ADMIN — AMLODIPINE BESYLATE 5 MG: 5 TABLET ORAL at 09:50

## 2020-04-26 RX ADMIN — Medication 1 TABLET: at 09:50

## 2020-04-27 LAB
ANION GAP SERPL CALCULATED.3IONS-SCNC: 3 MMOL/L (ref 4–13)
BASOPHILS # BLD AUTO: 0.04 THOUSANDS/ΜL (ref 0–0.1)
BASOPHILS NFR BLD AUTO: 0 % (ref 0–1)
BUN SERPL-MCNC: 20 MG/DL (ref 5–25)
CALCIUM SERPL-MCNC: 8.2 MG/DL (ref 8.3–10.1)
CHLORIDE SERPL-SCNC: 108 MMOL/L (ref 100–108)
CO2 SERPL-SCNC: 28 MMOL/L (ref 21–32)
CREAT SERPL-MCNC: 0.73 MG/DL (ref 0.6–1.3)
CRP SERPL QL: <3 MG/L
D DIMER PPP FEU-MCNC: 1.35 UG/ML FEU
EOSINOPHIL # BLD AUTO: 0 THOUSAND/ΜL (ref 0–0.61)
EOSINOPHIL NFR BLD AUTO: 0 % (ref 0–6)
ERYTHROCYTE [DISTWIDTH] IN BLOOD BY AUTOMATED COUNT: 16.7 % (ref 11.6–15.1)
FERRITIN SERPL-MCNC: 61 NG/ML (ref 8–388)
GFR SERPL CREATININE-BSD FRML MDRD: 94 ML/MIN/1.73SQ M
GLUCOSE SERPL-MCNC: 137 MG/DL (ref 65–140)
GLUCOSE SERPL-MCNC: 160 MG/DL (ref 65–140)
HCT VFR BLD AUTO: 34.2 % (ref 36.5–49.3)
HGB BLD-MCNC: 10.5 G/DL (ref 12–17)
IMM GRANULOCYTES # BLD AUTO: 0.4 THOUSAND/UL (ref 0–0.2)
IMM GRANULOCYTES NFR BLD AUTO: 2 % (ref 0–2)
LYMPHOCYTES # BLD AUTO: 0.82 THOUSANDS/ΜL (ref 0.6–4.47)
LYMPHOCYTES NFR BLD AUTO: 3 % (ref 14–44)
MCH RBC QN AUTO: 27.6 PG (ref 26.8–34.3)
MCHC RBC AUTO-ENTMCNC: 30.7 G/DL (ref 31.4–37.4)
MCV RBC AUTO: 90 FL (ref 82–98)
MONOCYTES # BLD AUTO: 0.91 THOUSAND/ΜL (ref 0.17–1.22)
MONOCYTES NFR BLD AUTO: 4 % (ref 4–12)
NEUTROPHILS # BLD AUTO: 21.94 THOUSANDS/ΜL (ref 1.85–7.62)
NEUTS SEG NFR BLD AUTO: 91 % (ref 43–75)
NRBC BLD AUTO-RTO: 0 /100 WBCS
PLATELET # BLD AUTO: 556 THOUSANDS/UL (ref 149–390)
PMV BLD AUTO: 11.5 FL (ref 8.9–12.7)
POTASSIUM SERPL-SCNC: 4.9 MMOL/L (ref 3.5–5.3)
POTASSIUM SERPL-SCNC: 6.2 MMOL/L (ref 3.5–5.3)
RBC # BLD AUTO: 3.81 MILLION/UL (ref 3.88–5.62)
SODIUM SERPL-SCNC: 139 MMOL/L (ref 136–145)
WBC # BLD AUTO: 24.11 THOUSAND/UL (ref 4.31–10.16)

## 2020-04-27 PROCEDURE — 94003 VENT MGMT INPAT SUBQ DAY: CPT

## 2020-04-27 PROCEDURE — 94762 N-INVAS EAR/PLS OXIMTRY CONT: CPT

## 2020-04-27 PROCEDURE — 82948 REAGENT STRIP/BLOOD GLUCOSE: CPT

## 2020-04-27 PROCEDURE — 84132 ASSAY OF SERUM POTASSIUM: CPT | Performed by: INTERNAL MEDICINE

## 2020-04-27 PROCEDURE — 99232 SBSQ HOSP IP/OBS MODERATE 35: CPT | Performed by: HOSPITALIST

## 2020-04-27 PROCEDURE — 94760 N-INVAS EAR/PLS OXIMETRY 1: CPT

## 2020-04-27 PROCEDURE — 82728 ASSAY OF FERRITIN: CPT | Performed by: INTERNAL MEDICINE

## 2020-04-27 PROCEDURE — 85379 FIBRIN DEGRADATION QUANT: CPT | Performed by: INTERNAL MEDICINE

## 2020-04-27 PROCEDURE — 85025 COMPLETE CBC W/AUTO DIFF WBC: CPT | Performed by: HOSPITALIST

## 2020-04-27 PROCEDURE — 86140 C-REACTIVE PROTEIN: CPT | Performed by: INTERNAL MEDICINE

## 2020-04-27 PROCEDURE — 80048 BASIC METABOLIC PNL TOTAL CA: CPT | Performed by: HOSPITALIST

## 2020-04-27 PROCEDURE — 99233 SBSQ HOSP IP/OBS HIGH 50: CPT | Performed by: INTERNAL MEDICINE

## 2020-04-27 PROCEDURE — 99232 SBSQ HOSP IP/OBS MODERATE 35: CPT | Performed by: INTERNAL MEDICINE

## 2020-04-27 RX ORDER — METHYLPREDNISOLONE SODIUM SUCCINATE 40 MG/ML
30 INJECTION, POWDER, LYOPHILIZED, FOR SOLUTION INTRAMUSCULAR; INTRAVENOUS EVERY 12 HOURS SCHEDULED
Status: DISCONTINUED | OUTPATIENT
Start: 2020-04-27 | End: 2020-04-29

## 2020-04-27 RX ORDER — POLYETHYLENE GLYCOL 3350 17 G/17G
17 POWDER, FOR SOLUTION ORAL DAILY PRN
Status: DISCONTINUED | OUTPATIENT
Start: 2020-04-27 | End: 2020-05-01 | Stop reason: HOSPADM

## 2020-04-27 RX ADMIN — CLOPIDOGREL BISULFATE 75 MG: 75 TABLET ORAL at 08:47

## 2020-04-27 RX ADMIN — SUCRALFATE 1 G: 1 TABLET ORAL at 06:40

## 2020-04-27 RX ADMIN — ESCITALOPRAM OXALATE 10 MG: 10 TABLET, FILM COATED ORAL at 08:47

## 2020-04-27 RX ADMIN — Medication 1 TABLET: at 08:47

## 2020-04-27 RX ADMIN — ASPIRIN 81 MG 81 MG: 81 TABLET ORAL at 08:47

## 2020-04-27 RX ADMIN — METHYLPREDNISOLONE SODIUM SUCCINATE 30 MG: 40 INJECTION, POWDER, FOR SOLUTION INTRAMUSCULAR; INTRAVENOUS at 22:35

## 2020-04-27 RX ADMIN — PANTOPRAZOLE SODIUM 40 MG: 40 TABLET, DELAYED RELEASE ORAL at 08:47

## 2020-04-27 RX ADMIN — CHOLECALCIFEROL TAB 25 MCG (1000 UNIT) 2000 UNITS: 25 TAB at 08:47

## 2020-04-27 RX ADMIN — ATORVASTATIN CALCIUM 80 MG: 40 TABLET, FILM COATED ORAL at 15:15

## 2020-04-27 RX ADMIN — METHYLPREDNISOLONE SODIUM SUCCINATE 40 MG: 40 INJECTION, POWDER, FOR SOLUTION INTRAMUSCULAR; INTRAVENOUS at 08:47

## 2020-04-27 RX ADMIN — SENNOSIDES AND DOCUSATE SODIUM 1 TABLET: 8.6; 5 TABLET ORAL at 22:35

## 2020-04-27 RX ADMIN — TAMSULOSIN HYDROCHLORIDE 0.4 MG: 0.4 CAPSULE ORAL at 15:15

## 2020-04-27 RX ADMIN — SUCRALFATE 1 G: 1 TABLET ORAL at 15:15

## 2020-04-27 RX ADMIN — SUCRALFATE 1 G: 1 TABLET ORAL at 13:02

## 2020-04-27 RX ADMIN — ENOXAPARIN SODIUM 105 MG: 120 INJECTION, SOLUTION INTRAVENOUS; SUBCUTANEOUS at 22:35

## 2020-04-27 RX ADMIN — AMLODIPINE BESYLATE 5 MG: 5 TABLET ORAL at 08:47

## 2020-04-28 LAB
ANION GAP SERPL CALCULATED.3IONS-SCNC: 6 MMOL/L (ref 4–13)
BASOPHILS # BLD AUTO: 0.02 THOUSANDS/ΜL (ref 0–0.1)
BASOPHILS NFR BLD AUTO: 0 % (ref 0–1)
BUN SERPL-MCNC: 20 MG/DL (ref 5–25)
CALCIUM SERPL-MCNC: 8.2 MG/DL (ref 8.3–10.1)
CHLORIDE SERPL-SCNC: 107 MMOL/L (ref 100–108)
CO2 SERPL-SCNC: 27 MMOL/L (ref 21–32)
CREAT SERPL-MCNC: 0.77 MG/DL (ref 0.6–1.3)
EOSINOPHIL # BLD AUTO: 0.01 THOUSAND/ΜL (ref 0–0.61)
EOSINOPHIL NFR BLD AUTO: 0 % (ref 0–6)
ERYTHROCYTE [DISTWIDTH] IN BLOOD BY AUTOMATED COUNT: 17.2 % (ref 11.6–15.1)
GFR SERPL CREATININE-BSD FRML MDRD: 92 ML/MIN/1.73SQ M
GLUCOSE SERPL-MCNC: 140 MG/DL (ref 65–140)
HCT VFR BLD AUTO: 35.2 % (ref 36.5–49.3)
HGB BLD-MCNC: 11.1 G/DL (ref 12–17)
IMM GRANULOCYTES # BLD AUTO: 0.28 THOUSAND/UL (ref 0–0.2)
IMM GRANULOCYTES NFR BLD AUTO: 1 % (ref 0–2)
LYMPHOCYTES # BLD AUTO: 1.16 THOUSANDS/ΜL (ref 0.6–4.47)
LYMPHOCYTES NFR BLD AUTO: 6 % (ref 14–44)
MCH RBC QN AUTO: 28.1 PG (ref 26.8–34.3)
MCHC RBC AUTO-ENTMCNC: 31.5 G/DL (ref 31.4–37.4)
MCV RBC AUTO: 89 FL (ref 82–98)
MONOCYTES # BLD AUTO: 1.29 THOUSAND/ΜL (ref 0.17–1.22)
MONOCYTES NFR BLD AUTO: 6 % (ref 4–12)
NEUTROPHILS # BLD AUTO: 18.2 THOUSANDS/ΜL (ref 1.85–7.62)
NEUTS SEG NFR BLD AUTO: 87 % (ref 43–75)
NRBC BLD AUTO-RTO: 0 /100 WBCS
PLATELET # BLD AUTO: 514 THOUSANDS/UL (ref 149–390)
PMV BLD AUTO: 11.3 FL (ref 8.9–12.7)
POTASSIUM SERPL-SCNC: 4.7 MMOL/L (ref 3.5–5.3)
RBC # BLD AUTO: 3.95 MILLION/UL (ref 3.88–5.62)
SODIUM SERPL-SCNC: 140 MMOL/L (ref 136–145)
WBC # BLD AUTO: 20.96 THOUSAND/UL (ref 4.31–10.16)

## 2020-04-28 PROCEDURE — 80048 BASIC METABOLIC PNL TOTAL CA: CPT | Performed by: HOSPITALIST

## 2020-04-28 PROCEDURE — 99232 SBSQ HOSP IP/OBS MODERATE 35: CPT | Performed by: INTERNAL MEDICINE

## 2020-04-28 PROCEDURE — 94760 N-INVAS EAR/PLS OXIMETRY 1: CPT

## 2020-04-28 PROCEDURE — 85025 COMPLETE CBC W/AUTO DIFF WBC: CPT | Performed by: HOSPITALIST

## 2020-04-28 RX ADMIN — PANTOPRAZOLE SODIUM 40 MG: 40 TABLET, DELAYED RELEASE ORAL at 07:34

## 2020-04-28 RX ADMIN — CHOLECALCIFEROL TAB 25 MCG (1000 UNIT) 2000 UNITS: 25 TAB at 07:34

## 2020-04-28 RX ADMIN — METHYLPREDNISOLONE SODIUM SUCCINATE 30 MG: 40 INJECTION, POWDER, FOR SOLUTION INTRAMUSCULAR; INTRAVENOUS at 21:34

## 2020-04-28 RX ADMIN — CLOPIDOGREL BISULFATE 75 MG: 75 TABLET ORAL at 07:34

## 2020-04-28 RX ADMIN — ENOXAPARIN SODIUM 105 MG: 120 INJECTION, SOLUTION INTRAVENOUS; SUBCUTANEOUS at 21:33

## 2020-04-28 RX ADMIN — ENOXAPARIN SODIUM 105 MG: 120 INJECTION, SOLUTION INTRAVENOUS; SUBCUTANEOUS at 07:34

## 2020-04-28 RX ADMIN — ASPIRIN 81 MG 81 MG: 81 TABLET ORAL at 07:34

## 2020-04-28 RX ADMIN — Medication 1 TABLET: at 07:34

## 2020-04-28 RX ADMIN — SUCRALFATE 1 G: 1 TABLET ORAL at 06:20

## 2020-04-28 RX ADMIN — SENNOSIDES AND DOCUSATE SODIUM 1 TABLET: 8.6; 5 TABLET ORAL at 21:34

## 2020-04-28 RX ADMIN — SUCRALFATE 1 G: 1 TABLET ORAL at 14:58

## 2020-04-28 RX ADMIN — METHYLPREDNISOLONE SODIUM SUCCINATE 30 MG: 40 INJECTION, POWDER, FOR SOLUTION INTRAMUSCULAR; INTRAVENOUS at 07:34

## 2020-04-28 RX ADMIN — ESCITALOPRAM OXALATE 10 MG: 10 TABLET, FILM COATED ORAL at 07:34

## 2020-04-28 RX ADMIN — ATORVASTATIN CALCIUM 80 MG: 40 TABLET, FILM COATED ORAL at 14:59

## 2020-04-28 RX ADMIN — AMLODIPINE BESYLATE 5 MG: 5 TABLET ORAL at 07:35

## 2020-04-28 RX ADMIN — TAMSULOSIN HYDROCHLORIDE 0.4 MG: 0.4 CAPSULE ORAL at 14:59

## 2020-04-29 LAB
ANION GAP SERPL CALCULATED.3IONS-SCNC: 6 MMOL/L (ref 4–13)
BASOPHILS # BLD AUTO: 0.01 THOUSANDS/ΜL (ref 0–0.1)
BASOPHILS NFR BLD AUTO: 0 % (ref 0–1)
BUN SERPL-MCNC: 21 MG/DL (ref 5–25)
CALCIUM SERPL-MCNC: 7.9 MG/DL (ref 8.3–10.1)
CHLORIDE SERPL-SCNC: 108 MMOL/L (ref 100–108)
CO2 SERPL-SCNC: 27 MMOL/L (ref 21–32)
CREAT SERPL-MCNC: 0.74 MG/DL (ref 0.6–1.3)
CRP SERPL QL: <3 MG/L
D DIMER PPP FEU-MCNC: 1.01 UG/ML FEU
EOSINOPHIL # BLD AUTO: 0.01 THOUSAND/ΜL (ref 0–0.61)
EOSINOPHIL NFR BLD AUTO: 0 % (ref 0–6)
ERYTHROCYTE [DISTWIDTH] IN BLOOD BY AUTOMATED COUNT: 17.4 % (ref 11.6–15.1)
FERRITIN SERPL-MCNC: 48 NG/ML (ref 8–388)
GFR SERPL CREATININE-BSD FRML MDRD: 93 ML/MIN/1.73SQ M
GLUCOSE SERPL-MCNC: 131 MG/DL (ref 65–140)
HCT VFR BLD AUTO: 35 % (ref 36.5–49.3)
HGB BLD-MCNC: 10.9 G/DL (ref 12–17)
IMM GRANULOCYTES # BLD AUTO: 0.23 THOUSAND/UL (ref 0–0.2)
IMM GRANULOCYTES NFR BLD AUTO: 1 % (ref 0–2)
LYMPHOCYTES # BLD AUTO: 1.16 THOUSANDS/ΜL (ref 0.6–4.47)
LYMPHOCYTES NFR BLD AUTO: 6 % (ref 14–44)
MCH RBC QN AUTO: 28.5 PG (ref 26.8–34.3)
MCHC RBC AUTO-ENTMCNC: 31.1 G/DL (ref 31.4–37.4)
MCV RBC AUTO: 91 FL (ref 82–98)
MONOCYTES # BLD AUTO: 1.21 THOUSAND/ΜL (ref 0.17–1.22)
MONOCYTES NFR BLD AUTO: 6 % (ref 4–12)
NEUTROPHILS # BLD AUTO: 16.52 THOUSANDS/ΜL (ref 1.85–7.62)
NEUTS SEG NFR BLD AUTO: 87 % (ref 43–75)
NRBC BLD AUTO-RTO: 0 /100 WBCS
PLATELET # BLD AUTO: 474 THOUSANDS/UL (ref 149–390)
PMV BLD AUTO: 11.8 FL (ref 8.9–12.7)
POTASSIUM SERPL-SCNC: 4.7 MMOL/L (ref 3.5–5.3)
RBC # BLD AUTO: 3.83 MILLION/UL (ref 3.88–5.62)
SODIUM SERPL-SCNC: 141 MMOL/L (ref 136–145)
WBC # BLD AUTO: 19.14 THOUSAND/UL (ref 4.31–10.16)

## 2020-04-29 PROCEDURE — 99232 SBSQ HOSP IP/OBS MODERATE 35: CPT | Performed by: INTERNAL MEDICINE

## 2020-04-29 PROCEDURE — 85379 FIBRIN DEGRADATION QUANT: CPT | Performed by: INTERNAL MEDICINE

## 2020-04-29 PROCEDURE — 86140 C-REACTIVE PROTEIN: CPT | Performed by: INTERNAL MEDICINE

## 2020-04-29 PROCEDURE — 82728 ASSAY OF FERRITIN: CPT | Performed by: INTERNAL MEDICINE

## 2020-04-29 PROCEDURE — 85025 COMPLETE CBC W/AUTO DIFF WBC: CPT | Performed by: HOSPITALIST

## 2020-04-29 PROCEDURE — 80048 BASIC METABOLIC PNL TOTAL CA: CPT | Performed by: HOSPITALIST

## 2020-04-29 RX ADMIN — SUCRALFATE 1 G: 1 TABLET ORAL at 11:30

## 2020-04-29 RX ADMIN — PANTOPRAZOLE SODIUM 40 MG: 40 TABLET, DELAYED RELEASE ORAL at 08:48

## 2020-04-29 RX ADMIN — ENOXAPARIN SODIUM 105 MG: 120 INJECTION, SOLUTION INTRAVENOUS; SUBCUTANEOUS at 21:29

## 2020-04-29 RX ADMIN — SENNOSIDES AND DOCUSATE SODIUM 1 TABLET: 8.6; 5 TABLET ORAL at 21:29

## 2020-04-29 RX ADMIN — SUCRALFATE 1 G: 1 TABLET ORAL at 06:18

## 2020-04-29 RX ADMIN — PREDNISONE 50 MG: 20 TABLET ORAL at 14:28

## 2020-04-29 RX ADMIN — ASPIRIN 81 MG 81 MG: 81 TABLET ORAL at 08:48

## 2020-04-29 RX ADMIN — CHOLECALCIFEROL TAB 25 MCG (1000 UNIT) 2000 UNITS: 25 TAB at 08:48

## 2020-04-29 RX ADMIN — TAMSULOSIN HYDROCHLORIDE 0.4 MG: 0.4 CAPSULE ORAL at 16:23

## 2020-04-29 RX ADMIN — CLOPIDOGREL BISULFATE 75 MG: 75 TABLET ORAL at 08:48

## 2020-04-29 RX ADMIN — AMLODIPINE BESYLATE 5 MG: 5 TABLET ORAL at 08:48

## 2020-04-29 RX ADMIN — ENOXAPARIN SODIUM 105 MG: 120 INJECTION, SOLUTION INTRAVENOUS; SUBCUTANEOUS at 08:47

## 2020-04-29 RX ADMIN — SUCRALFATE 1 G: 1 TABLET ORAL at 16:23

## 2020-04-29 RX ADMIN — ATORVASTATIN CALCIUM 80 MG: 40 TABLET, FILM COATED ORAL at 16:23

## 2020-04-29 RX ADMIN — Medication 1 TABLET: at 08:48

## 2020-04-29 RX ADMIN — METHYLPREDNISOLONE SODIUM SUCCINATE 30 MG: 40 INJECTION, POWDER, FOR SOLUTION INTRAMUSCULAR; INTRAVENOUS at 08:48

## 2020-04-29 RX ADMIN — ESCITALOPRAM OXALATE 10 MG: 10 TABLET, FILM COATED ORAL at 08:48

## 2020-04-30 LAB
ERYTHROCYTE [DISTWIDTH] IN BLOOD BY AUTOMATED COUNT: 18.2 % (ref 11.6–15.1)
FERRITIN SERPL-MCNC: 46 NG/ML (ref 8–388)
HCT VFR BLD AUTO: 35.9 % (ref 36.5–49.3)
HGB BLD-MCNC: 11 G/DL (ref 12–17)
IRON SATN MFR SERPL: 18 %
IRON SERPL-MCNC: 53 UG/DL (ref 65–175)
MCH RBC QN AUTO: 28.3 PG (ref 26.8–34.3)
MCHC RBC AUTO-ENTMCNC: 30.6 G/DL (ref 31.4–37.4)
MCV RBC AUTO: 92 FL (ref 82–98)
PLATELET # BLD AUTO: 450 THOUSANDS/UL (ref 149–390)
PMV BLD AUTO: 11.4 FL (ref 8.9–12.7)
RBC # BLD AUTO: 3.89 MILLION/UL (ref 3.88–5.62)
TIBC SERPL-MCNC: 296 UG/DL (ref 250–450)
WBC # BLD AUTO: 11.33 THOUSAND/UL (ref 4.31–10.16)

## 2020-04-30 PROCEDURE — 99232 SBSQ HOSP IP/OBS MODERATE 35: CPT | Performed by: INTERNAL MEDICINE

## 2020-04-30 PROCEDURE — 85027 COMPLETE CBC AUTOMATED: CPT | Performed by: INTERNAL MEDICINE

## 2020-04-30 PROCEDURE — 83550 IRON BINDING TEST: CPT | Performed by: INTERNAL MEDICINE

## 2020-04-30 PROCEDURE — 99231 SBSQ HOSP IP/OBS SF/LOW 25: CPT | Performed by: INTERNAL MEDICINE

## 2020-04-30 PROCEDURE — 82728 ASSAY OF FERRITIN: CPT | Performed by: INTERNAL MEDICINE

## 2020-04-30 PROCEDURE — 83540 ASSAY OF IRON: CPT | Performed by: INTERNAL MEDICINE

## 2020-04-30 RX ADMIN — CHOLECALCIFEROL TAB 25 MCG (1000 UNIT) 2000 UNITS: 25 TAB at 08:24

## 2020-04-30 RX ADMIN — PREDNISONE 50 MG: 20 TABLET ORAL at 08:26

## 2020-04-30 RX ADMIN — AMLODIPINE BESYLATE 5 MG: 5 TABLET ORAL at 08:25

## 2020-04-30 RX ADMIN — SUCRALFATE 1 G: 1 TABLET ORAL at 13:38

## 2020-04-30 RX ADMIN — ASPIRIN 81 MG 81 MG: 81 TABLET ORAL at 08:24

## 2020-04-30 RX ADMIN — Medication 1 TABLET: at 08:25

## 2020-04-30 RX ADMIN — SUCRALFATE 1 G: 1 TABLET ORAL at 15:44

## 2020-04-30 RX ADMIN — PANTOPRAZOLE SODIUM 40 MG: 40 TABLET, DELAYED RELEASE ORAL at 08:25

## 2020-04-30 RX ADMIN — ATORVASTATIN CALCIUM 80 MG: 40 TABLET, FILM COATED ORAL at 15:44

## 2020-04-30 RX ADMIN — ESCITALOPRAM OXALATE 10 MG: 10 TABLET, FILM COATED ORAL at 08:25

## 2020-04-30 RX ADMIN — CLOPIDOGREL BISULFATE 75 MG: 75 TABLET ORAL at 08:25

## 2020-04-30 RX ADMIN — TAMSULOSIN HYDROCHLORIDE 0.4 MG: 0.4 CAPSULE ORAL at 15:44

## 2020-04-30 RX ADMIN — ENOXAPARIN SODIUM 105 MG: 120 INJECTION, SOLUTION INTRAVENOUS; SUBCUTANEOUS at 08:25

## 2020-04-30 RX ADMIN — SENNOSIDES AND DOCUSATE SODIUM 1 TABLET: 8.6; 5 TABLET ORAL at 20:54

## 2020-04-30 RX ADMIN — SUCRALFATE 1 G: 1 TABLET ORAL at 08:24

## 2020-05-01 VITALS
OXYGEN SATURATION: 93 % | BODY MASS INDEX: 42.7 KG/M2 | HEART RATE: 59 BPM | DIASTOLIC BLOOD PRESSURE: 68 MMHG | WEIGHT: 233.47 LBS | SYSTOLIC BLOOD PRESSURE: 118 MMHG | RESPIRATION RATE: 16 BRPM | TEMPERATURE: 97.5 F

## 2020-05-01 PROCEDURE — 99239 HOSP IP/OBS DSCHRG MGMT >30: CPT | Performed by: INTERNAL MEDICINE

## 2020-05-01 RX ORDER — PREDNISONE 10 MG/1
TABLET ORAL
Qty: 30 TABLET | Refills: 0
Start: 2020-05-02 | End: 2020-05-14

## 2020-05-01 RX ADMIN — CLOPIDOGREL BISULFATE 75 MG: 75 TABLET ORAL at 08:16

## 2020-05-01 RX ADMIN — PANTOPRAZOLE SODIUM 40 MG: 40 TABLET, DELAYED RELEASE ORAL at 08:16

## 2020-05-01 RX ADMIN — ESCITALOPRAM OXALATE 10 MG: 10 TABLET, FILM COATED ORAL at 08:16

## 2020-05-01 RX ADMIN — CHOLECALCIFEROL TAB 25 MCG (1000 UNIT) 2000 UNITS: 25 TAB at 08:16

## 2020-05-01 RX ADMIN — SUCRALFATE 1 G: 1 TABLET ORAL at 05:02

## 2020-05-01 RX ADMIN — Medication 1 TABLET: at 08:17

## 2020-05-01 RX ADMIN — SUCRALFATE 1 G: 1 TABLET ORAL at 15:46

## 2020-05-01 RX ADMIN — ENOXAPARIN SODIUM 40 MG: 40 INJECTION SUBCUTANEOUS at 08:16

## 2020-05-01 RX ADMIN — PREDNISONE 50 MG: 20 TABLET ORAL at 08:16

## 2020-05-01 RX ADMIN — ATORVASTATIN CALCIUM 80 MG: 40 TABLET, FILM COATED ORAL at 15:46

## 2020-05-01 RX ADMIN — ASPIRIN 81 MG 81 MG: 81 TABLET ORAL at 08:16

## 2020-05-01 RX ADMIN — AMLODIPINE BESYLATE 5 MG: 5 TABLET ORAL at 08:16

## 2020-05-01 RX ADMIN — TAMSULOSIN HYDROCHLORIDE 0.4 MG: 0.4 CAPSULE ORAL at 15:46

## 2020-09-01 ENCOUNTER — NURSING HOME VISIT (OUTPATIENT)
Dept: GERIATRICS | Facility: OTHER | Age: 70
End: 2020-09-01
Payer: COMMERCIAL

## 2020-09-01 DIAGNOSIS — U07.1 PNEUMONIA DUE TO COVID-19 VIRUS: ICD-10-CM

## 2020-09-01 DIAGNOSIS — R26.2 AMBULATORY DYSFUNCTION: ICD-10-CM

## 2020-09-01 DIAGNOSIS — J12.82 PNEUMONIA DUE TO COVID-19 VIRUS: ICD-10-CM

## 2020-09-01 DIAGNOSIS — K21.00 GASTROESOPHAGEAL REFLUX DISEASE WITH ESOPHAGITIS: ICD-10-CM

## 2020-09-01 DIAGNOSIS — E66.01 MORBID OBESITY WITH BMI OF 45.0-49.9, ADULT (HCC): Chronic | ICD-10-CM

## 2020-09-01 DIAGNOSIS — F32.A DEPRESSION, UNSPECIFIED DEPRESSION TYPE: ICD-10-CM

## 2020-09-01 DIAGNOSIS — I10 ESSENTIAL HYPERTENSION: Primary | ICD-10-CM

## 2020-09-01 PROBLEM — J96.01 ACUTE RESPIRATORY FAILURE WITH HYPOXIA (HCC): Status: RESOLVED | Noted: 2020-04-15 | Resolved: 2020-09-01

## 2020-09-01 PROCEDURE — 99309 SBSQ NF CARE MODERATE MDM 30: CPT | Performed by: NURSE PRACTITIONER

## 2020-09-01 NOTE — ASSESSMENT & PLAN NOTE
Asymptomatic on this visit  Continue the following meds:  * Pantoprazole 40mg daily  * Sucralfate 1G before meals  * Gaviscon PRN

## 2020-09-01 NOTE — ASSESSMENT & PLAN NOTE
PHQ2: Negative  Continue Escitalopram 10mg daily  Nursing to continue to monitor and document mood/behaviors

## 2020-09-01 NOTE — ASSESSMENT & PLAN NOTE
Weight loss: 10 lbs x 1 month  BMI: 40 57 kgs/m2 (8/30/2020) <= 42 34 kg/m2 (7/6/020)  Followed by RD in facility  Continue monthly weight

## 2020-09-01 NOTE — PROGRESS NOTES
Progress Note    Location: [place]  POS: 32 (LTC)    Assessment/Plan:    HTN (hypertension)  BP controlled and stable with current meds  On monthly checks only  Ave BP range: 118/60 to 134/68  Goal: < 140 - 150/90  Continue the following meds:  * Amlodipine 5mg daily  * ASA 81mg daily  CBC w/o diff and BMP every 6 months: November, 2020    Ambulatory dysfunction  Hx of Basal Ganglia Infarction  String Hx of recurrent falls  Continue PT/OT/ST as needed  Continue 24/7 LTCF supportive care and management  Fall precation    Pneumonia due to COVID-19 virus  Required acute care hospitalization related acute respiratory failure (April, 2020)  Deemed clinically recovered  No Cardiopulmonary symptoms  Afebrile for more than 3 weeks  Gastroesophageal reflux disease with esophagitis  Asymptomatic on this visit  Continue the following meds:  * Pantoprazole 40mg daily  * Sucralfate 1G before meals  * Gaviscon PRN    Morbid obesity with BMI of 45 0-49 9, adult ([facility])  Weight loss: 10 lbs x 1 month  BMI: 40 57 kgs/m2 (8/30/2020) <= 42 34 kg/m2 (7/6/020)  Followed by RD in facility  Continue monthly weight    Depression  PHQ2: Negative  Continue Escitalopram 10mg daily  Nursing to continue to monitor and document mood/behaviors  Chief complaint / Reason for visit: Follow-up visit (30 day)    History of Present Illness: This is a 78 y o  Male patient admitted at [facility] for debility  Patient is seen and examined today to follow-up acute and chronic medical conditions: HTN, Ambulatory Dysfunction, COVID-19 infection, GERD and Obesity  Patient is OOB - alert, cooperative and engaged, calm and pleasant  Patient is verbal with clear speech - oriented x4  Able to recall days of the week (forward and backwards) w/o problems   Denies any acute medical concerns for this visit including, new or worsening pain, chest pain, SOB, fever, chills, headache cough/colds symptoms, malaise/fatigue, dizziness/ vertigo or sleep/ appetite concerns  Per nursing, no acute medical concerns for this visit  Examination is unremarkable  V/S: T98 2F -P80 -R18 BP: 124/50 SpO2: 95% RA  Review of Systems:  Per history of present illness, all other systems reviewed and negative  HISTORY:  Medical Hx: Reviewed, unchanged  Family Hx: Reviewed, unchanged  Soc Hx: Reviewed,  Unchanged    ALLERGY: No Known Allergies    PHYSICAL EXAM:  Vital Signs:  T98 2F -P80 -R18 BP: 124/50 SpO2: 95% RA  Weight: 229 0 lbs (8/30/2020) <= 239 0 lbs (7/6/2020) <= 218 2 lbs (6/3/2020)      Physical Exam  Vitals signs and nursing note reviewed  Constitutional:       General: He is not in acute distress  Appearance: Normal appearance  He is obese  He is not ill-appearing, toxic-appearing or diaphoretic  HENT:      Head: Normocephalic and atraumatic  Right Ear: There is impacted cerumen  Left Ear: There is impacted cerumen  Nose: Nose normal  No congestion or rhinorrhea  Mouth/Throat:      Mouth: Mucous membranes are moist       Pharynx: Oropharynx is clear  No oropharyngeal exudate or posterior oropharyngeal erythema  Eyes:      General: No scleral icterus  Right eye: No discharge  Left eye: No discharge  Conjunctiva/sclera: Conjunctivae normal       Pupils: Pupils are equal, round, and reactive to light  Neck:      Musculoskeletal: Normal range of motion and neck supple  No neck rigidity or muscular tenderness  Vascular: No carotid bruit  Cardiovascular:      Rate and Rhythm: Normal rate and regular rhythm  Heart sounds: Normal heart sounds  No murmur  No friction rub  No gallop  Pulmonary:      Effort: Pulmonary effort is normal  No respiratory distress  Breath sounds: Normal breath sounds  No stridor  No wheezing, rhonchi or rales  Chest:      Chest wall: No tenderness  Abdominal:      General: Bowel sounds are normal  There is no distension  Palpations: Abdomen is soft  There is no mass  Tenderness: There is no abdominal tenderness  There is no right CVA tenderness, left CVA tenderness, guarding or rebound  Hernia: No hernia is present  Genitourinary:     Comments: Noon distended bladder  Musculoskeletal:         General: No swelling, tenderness, deformity or signs of injury  Right lower leg: Edema present  Left lower leg: Edema present  Comments: Non ambulatory - wheelchair bound  Chronic BLE edema (+1) - no erythema or signs of dermatitis/ cellulitis  Lymphadenopathy:      Cervical: No cervical adenopathy  Skin:     General: Skin is warm  Coloration: Skin is not jaundiced or pale  Findings: No bruising, erythema, lesion or rash  Neurological:      Mental Status: He is oriented to person, place, and time  Mental status is at baseline  Comments: Oriented to name/birthday/place/date  Able to recall days of the week (forwards/backwards) w/o problems  Verbal with clear speech  Psychiatric:         Mood and Affect: Mood normal          Behavior: Behavior normal        Laboratory results / Imaging: No nee lab results to review at this time after May, 2020     Current Medications:   All medications reviewed and updated in [address] [place] [address] [id number]  9/1/2020

## 2020-09-01 NOTE — ASSESSMENT & PLAN NOTE
BP controlled and stable with current meds  On monthly checks only    Ave BP range: 118/60 to 134/68  Goal: < 140 - 150/90  Continue the following meds:  * Amlodipine 5mg daily  * ASA 81mg daily  CBC w/o diff and BMP every 6 months: November, 2020

## 2020-09-01 NOTE — ASSESSMENT & PLAN NOTE
Hx of Basal Ganglia Infarction  String Hx of recurrent falls  Continue PT/OT/ST as needed  Continue 24/7 LTCF supportive care and management  Fall precation

## 2020-09-01 NOTE — ASSESSMENT & PLAN NOTE
Required acute care hospitalization related acute respiratory failure (April, 2020)  Deemed clinically recovered  No Cardiopulmonary symptoms  Afebrile for more than 3 weeks

## 2020-10-28 ENCOUNTER — NURSING HOME VISIT (OUTPATIENT)
Dept: GERIATRICS | Facility: OTHER | Age: 70
End: 2020-10-28
Payer: COMMERCIAL

## 2020-10-28 DIAGNOSIS — I63.9 BASAL GANGLIA INFARCTION (HCC): ICD-10-CM

## 2020-10-28 DIAGNOSIS — K21.00 GASTROESOPHAGEAL REFLUX DISEASE WITH ESOPHAGITIS WITHOUT HEMORRHAGE: ICD-10-CM

## 2020-10-28 DIAGNOSIS — E66.01 MORBID OBESITY WITH BMI OF 45.0-49.9, ADULT (HCC): Primary | Chronic | ICD-10-CM

## 2020-10-28 DIAGNOSIS — G31.84 MILD COGNITIVE IMPAIRMENT: Chronic | ICD-10-CM

## 2020-10-28 DIAGNOSIS — I10 ESSENTIAL HYPERTENSION: ICD-10-CM

## 2020-10-28 DIAGNOSIS — F32.A DEPRESSION, UNSPECIFIED DEPRESSION TYPE: ICD-10-CM

## 2020-10-28 DIAGNOSIS — R26.2 AMBULATORY DYSFUNCTION: ICD-10-CM

## 2020-10-28 PROCEDURE — 99309 SBSQ NF CARE MODERATE MDM 30: CPT | Performed by: FAMILY MEDICINE

## 2020-12-11 ENCOUNTER — NURSING HOME VISIT (OUTPATIENT)
Dept: GERIATRICS | Facility: OTHER | Age: 70
End: 2020-12-11
Payer: COMMERCIAL

## 2020-12-11 DIAGNOSIS — I10 ESSENTIAL HYPERTENSION: Primary | ICD-10-CM

## 2020-12-11 DIAGNOSIS — F32.A DEPRESSION, UNSPECIFIED DEPRESSION TYPE: ICD-10-CM

## 2020-12-11 DIAGNOSIS — K21.00 GASTROESOPHAGEAL REFLUX DISEASE WITH ESOPHAGITIS WITHOUT HEMORRHAGE: ICD-10-CM

## 2020-12-11 DIAGNOSIS — G31.84 MILD COGNITIVE IMPAIRMENT: Chronic | ICD-10-CM

## 2020-12-11 PROCEDURE — 99309 SBSQ NF CARE MODERATE MDM 30: CPT | Performed by: PHYSICIAN ASSISTANT

## 2021-01-18 ENCOUNTER — NURSING HOME VISIT (OUTPATIENT)
Dept: GERIATRICS | Facility: OTHER | Age: 71
End: 2021-01-18
Payer: COMMERCIAL

## 2021-01-18 DIAGNOSIS — I10 ESSENTIAL HYPERTENSION: ICD-10-CM

## 2021-01-18 DIAGNOSIS — F32.A DEPRESSION, UNSPECIFIED DEPRESSION TYPE: ICD-10-CM

## 2021-01-18 DIAGNOSIS — K21.00 GASTROESOPHAGEAL REFLUX DISEASE WITH ESOPHAGITIS WITHOUT HEMORRHAGE: Primary | ICD-10-CM

## 2021-01-18 DIAGNOSIS — G31.84 MILD COGNITIVE IMPAIRMENT: Chronic | ICD-10-CM

## 2021-01-18 PROCEDURE — 99309 SBSQ NF CARE MODERATE MDM 30: CPT | Performed by: FAMILY MEDICINE

## 2021-01-18 NOTE — ASSESSMENT & PLAN NOTE
· Continue frequent reorientation and redirection  · Assist with ADLs  · Regulation of sleep-wake cycles  · Fall precautions

## 2021-01-18 NOTE — ASSESSMENT & PLAN NOTE
- continue pantoprazole 40 mg po qd  - continue carafate 1g po tid     · Continue pantoprazole 40 mg daily   · Continue carafate
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
## 2021-01-18 NOTE — ASSESSMENT & PLAN NOTE
BP reviewed and acceptable at 128/80  · Continue amlodipine 5 mg daily  · Continue to monitor BP regularly

## 2021-03-04 ENCOUNTER — NURSING HOME VISIT (OUTPATIENT)
Dept: GERIATRICS | Facility: OTHER | Age: 71
End: 2021-03-04
Payer: COMMERCIAL

## 2021-03-04 DIAGNOSIS — I10 ESSENTIAL HYPERTENSION: Primary | ICD-10-CM

## 2021-03-04 DIAGNOSIS — G31.84 MILD COGNITIVE IMPAIRMENT: Chronic | ICD-10-CM

## 2021-03-04 DIAGNOSIS — F32.A DEPRESSION, UNSPECIFIED DEPRESSION TYPE: ICD-10-CM

## 2021-03-04 DIAGNOSIS — N40.0 BENIGN PROSTATIC HYPERPLASIA, UNSPECIFIED WHETHER LOWER URINARY TRACT SYMPTOMS PRESENT: ICD-10-CM

## 2021-03-04 DIAGNOSIS — K21.00 GASTROESOPHAGEAL REFLUX DISEASE WITH ESOPHAGITIS WITHOUT HEMORRHAGE: ICD-10-CM

## 2021-03-04 PROCEDURE — 99309 SBSQ NF CARE MODERATE MDM 30: CPT | Performed by: PHYSICIAN ASSISTANT

## 2021-03-04 NOTE — ASSESSMENT & PLAN NOTE
- BP log reviewed, stable  - continue amlodipine   - continue to monitor BP monthly at facility, more often if any symptoms

## 2021-03-04 NOTE — ASSESSMENT & PLAN NOTE
- Provide frequent redirection, reorientation, distraction techniques  - continue fall precautions at facility  - continue to assist with ADLs at facility   - Avoid deliriogenic medications such as tramadol, benzodiazepines, anticholinergics,  Benadryl  - Encourage Hydration/ Nutrition  - Implement sleep hygiene, limit night time interuptions, group activities

## 2021-03-04 NOTE — PROGRESS NOTES
[facility]  [address]  [phone]  [place]  [address]         NAME: Lyric Turner  AGE: 69 y o  SEX: male    DATE OF ENCOUNTER: 3/4/2021    Assessment and Plan     HTN (hypertension)  - BP log reviewed, stable  - continue amlodipine   - continue to monitor BP monthly at facility, more often if any symptoms    Mild cognitive impairment  - Provide frequent redirection, reorientation, distraction techniques  - continue fall precautions at facility  - continue to assist with ADLs at facility   - Avoid deliriogenic medications such as tramadol, benzodiazepines, anticholinergics,  Benadryl  - Encourage Hydration/ Nutrition  - Implement sleep hygiene, limit night time interuptions, group activities          Depression  - continue lexapro 10 mg po qd  - denies si/hi    Gastroesophageal reflux disease with esophagitis  - continue ppi   - continue carafate before meals    BPH (benign prostatic hyperplasia)  - continue flomax       All medications and routine orders were reviewed and updated as needed  Chief Complaint     LTCF FOLLOW-UP    History of Present Illness        KF is a 68 yo M with multiple medical comorbidities including but not limited to HTN, GERD, BPH, depression  interviewed and examined in collaboration with nursing for evaluation of acute and chronic medical comorbidities for LTCF follow-up  Pt denies pain at this time  Pt notes eating well and staying well hydrated  Per facility chart, pt is completing 100% of each meal  Pt denies GI and urinary issues  Per facility chart, pt is incontinent of urine  Per facility chart, pt's last BM was 3/4/21  No acute concerns from nursing at this time  Per facility chart, pt requires extensive assistance to total dependence with transfers  Pt uses wheelchair  for mobility  The patient's allergies, past medical, surgical, social and family history were reviewed and unchanged      Review of Systems     Review of Systems   Constitutional: Positive for activity change  Negative for appetite change, chills and fever  HENT: Negative for sore throat  Respiratory: Negative for cough and shortness of breath  Cardiovascular: Negative for chest pain and palpitations  Gastrointestinal: Negative for abdominal distention, abdominal pain, diarrhea, nausea and vomiting  Genitourinary: Negative for difficulty urinating and dysuria  Musculoskeletal: Positive for arthralgias and gait problem  Neurological: Positive for weakness  Negative for dizziness, light-headedness and headaches  Psychiatric/Behavioral: Negative for suicidal ideas  Objective     Vitals: 124/57- 97 3F- 64 bpm- 238lb- 18- 97% on RA    Physical Exam  Vitals signs and nursing note reviewed  Constitutional:       General: He is not in acute distress  Appearance: He is not diaphoretic  Comments: Male seated comfortably in wheelchair    HENT:      Head: Normocephalic and atraumatic  Nose: Nose normal  No congestion or rhinorrhea  Mouth/Throat:      Pharynx: No oropharyngeal exudate  Eyes:      General: No scleral icterus  Right eye: No discharge  Left eye: No discharge  Conjunctiva/sclera: Conjunctivae normal       Pupils: Pupils are equal, round, and reactive to light  Cardiovascular:      Rate and Rhythm: Normal rate and regular rhythm  Heart sounds: No murmur  No friction rub  No gallop  Pulmonary:      Effort: Pulmonary effort is normal  No respiratory distress  Breath sounds: Normal breath sounds  No stridor  No wheezing or rales  Abdominal:      General: Bowel sounds are normal  There is no distension  Palpations: Abdomen is soft  Tenderness: There is no abdominal tenderness  There is no guarding or rebound  Musculoskeletal:      Comments: Able to lift extremities x 4 against gravity, AROM LLE>RLE   Neurological:      Mental Status: He is alert        Comments: Oriented to person and place (state), not oriented to year "2012"   Psychiatric:      Comments: Pleasant and cooperative during exam          Pertinent Laboratory/Diagnostic Studies:  79/89/51  BASIC METABOLIC PNL  GLUCOSE 83 mg/dL 65-99 Final  BUN 16 mg/dL 7-28 Final  CREATININE 0 82 mg/dL 0 53-1 30 Final  SODIUM 142 mmol/L 135-145 Final  POTASSIUM 4 4 mmol/L 3 5-5 2 Final  CHLORIDE 111 mmol/L 100-109 H Final  CARBON DIOXIDE 24 mmol/L 23-31 Final  CALCIUM 9 1 mg/dL 8 5-10 1 Final  ANION GAP 7 3-11 Final  GFR, CALCULATED 90 >60 Final  mL/min per 1 73 square meters   Normal Function or Mild Renal   Disease (if clinically at risk): >or=60   Moderately Decreased: 30-59   Severely Decreased: 15-29   Renal Failure: <15   -American GFR: multiply reported GFR by 1 16   Please note that the eGFR is based on the CKD-EPI calculation, and is   not intended to be used for drug dosing  Note: Calculated GFR may not be an accurate indicator of renal   function if the patient's renal function is not in a steady state  CBC NO DIFF  HEMOGLOBIN 10 4 g/dL 12 5-17 0 L Final  HEMATOCRIT 31 9 % 37 0-48 0 L Final  WBC 9 4 thou/cmm 4 0-10 5 Final  RBC 3 83 mill/cmm 4 00-5 40 L Final  PLATELET COUNT 444 thou/cmm 140-350 Final    Current Medications   Medications reviewed and updated see facility [facility] for details        Current Outpatient Medications:     amLODIPine (NORVASC) 5 mg tablet, Take 1 tablet (5 mg total) by mouth daily, Disp: 30 tablet, Rfl: 0    aspirin 81 mg chewable tablet, Chew 1 tablet (81 mg total) daily, Disp: 60 tablet, Rfl: 0    atorvastatin (LIPITOR) 80 mg tablet, Take 1 tablet (80 mg total) by mouth daily with dinner, Disp: 60 tablet, Rfl: 0    escitalopram (LEXAPRO) 10 mg tablet, Take 10 mg by mouth daily, Disp: , Rfl:     pantoprazole (PROTONIX) 40 mg tablet, Take 40 mg by mouth daily, Disp: , Rfl:     polyethylene glycol (MIRALAX) 17 g packet, Take 17 g by mouth daily as needed, Disp: , Rfl:     sucralfate (CARAFATE) 1 g tablet, Take 1 g by mouth 3 (three) times a day before meals, Disp: , Rfl:     tamsulosin (FLOMAX) 0 4 mg, Take 0 4 mg by mouth daily with dinner, Disp: , Rfl:       Dawna Rodriguez PA-C  3/4/2021 3:10 PM

## 2021-05-24 ENCOUNTER — NURSING HOME VISIT (OUTPATIENT)
Dept: GERIATRICS | Facility: OTHER | Age: 71
End: 2021-05-24
Payer: COMMERCIAL

## 2021-05-24 DIAGNOSIS — F32.A DEPRESSION, UNSPECIFIED DEPRESSION TYPE: ICD-10-CM

## 2021-05-24 DIAGNOSIS — N40.0 BENIGN PROSTATIC HYPERPLASIA, UNSPECIFIED WHETHER LOWER URINARY TRACT SYMPTOMS PRESENT: ICD-10-CM

## 2021-05-24 DIAGNOSIS — E66.01 MORBID OBESITY WITH BMI OF 45.0-49.9, ADULT (HCC): Chronic | ICD-10-CM

## 2021-05-24 DIAGNOSIS — I10 ESSENTIAL HYPERTENSION: ICD-10-CM

## 2021-05-24 DIAGNOSIS — K21.00 GASTROESOPHAGEAL REFLUX DISEASE WITH ESOPHAGITIS WITHOUT HEMORRHAGE: ICD-10-CM

## 2021-05-24 DIAGNOSIS — R26.2 AMBULATORY DYSFUNCTION: ICD-10-CM

## 2021-05-24 DIAGNOSIS — G31.84 MILD COGNITIVE IMPAIRMENT: Primary | Chronic | ICD-10-CM

## 2021-05-24 PROCEDURE — 99309 SBSQ NF CARE MODERATE MDM 30: CPT | Performed by: FAMILY MEDICINE
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
## 2021-05-27 ENCOUNTER — TELEPHONE (OUTPATIENT)
Dept: OTHER | Facility: OTHER | Age: 71
End: 2021-05-27

## 2021-06-29 ENCOUNTER — NURSING HOME VISIT (OUTPATIENT)
Dept: GERIATRICS | Facility: OTHER | Age: 71
End: 2021-06-29
Payer: COMMERCIAL

## 2021-06-29 DIAGNOSIS — Z86.73 HISTORY OF CVA (CEREBROVASCULAR ACCIDENT): Primary | ICD-10-CM

## 2021-06-29 DIAGNOSIS — F32.A DEPRESSION, UNSPECIFIED DEPRESSION TYPE: ICD-10-CM

## 2021-06-29 DIAGNOSIS — G31.84 MILD COGNITIVE IMPAIRMENT: Chronic | ICD-10-CM

## 2021-06-29 DIAGNOSIS — K21.00 GASTROESOPHAGEAL REFLUX DISEASE WITH ESOPHAGITIS WITHOUT HEMORRHAGE: ICD-10-CM

## 2021-06-29 DIAGNOSIS — I10 ESSENTIAL HYPERTENSION: ICD-10-CM

## 2021-06-29 DIAGNOSIS — D50.9 IRON DEFICIENCY ANEMIA, UNSPECIFIED IRON DEFICIENCY ANEMIA TYPE: ICD-10-CM

## 2021-06-29 DIAGNOSIS — N40.0 BENIGN PROSTATIC HYPERPLASIA, UNSPECIFIED WHETHER LOWER URINARY TRACT SYMPTOMS PRESENT: ICD-10-CM

## 2021-06-29 PROCEDURE — 99309 SBSQ NF CARE MODERATE MDM 30: CPT | Performed by: NURSE PRACTITIONER

## 2021-06-29 NOTE — PROGRESS NOTES
[address], [address] [place] [address]  [phone]    NAME: Yasmin Trejo  AGE: 69 y o  SEX: male    Progress Note    Location:   POS: 28 ([facility])    Assessment/Plan:    History of CVA (cerebrovascular accident)  Historically stable and controlled BP and HR  Continue ASA 81mg daily + Atorvastatin 80mg daily  Continue Depakote ER 250mg daily at HS  CMP on 7/5/2021    Mild cognitive impairment  Pleasant and calm  Continue to redirect and reorient as often as needed  Reinforce information as often as needed  Continue MVI daily    HTN (hypertension)  BP and HR stable and controlled  BP range (6/1-30/2021) = 111/63 to  147/98  HR range (6/1-30/2021) = 66 to 92/min  Continue Amlodipine 5mg daily    Depression  With anxiety component  Continue Buspirone 5mg BID + Escitalopram 10mg daily  Followed by Psychiatry    BPH (benign prostatic hyperplasia)  Denies any new or worsening symptoms  Continue Tamsulosin 0 4mg daily at HS    Gastroesophageal reflux disease with esophagitis  Asymptomatic on this visit  Continue Pantoprazole 40mg daily + Sucralfate 1G with meals    Anemia  Hbg/Hct: 10 4 / 31 9 (12/15/2020)  Continue MVI daily  CBC without diff on 7/5/2021    Chief complaint / Reason for visit: Follow-up visit (30 day)    History of Present Illness: This is a 69 y o  Male patient admitted at [facility] for debility  Patient is seen and examined today to follow-up acute and chronic medical conditions: HTN, HERD, Hx of Thalamic infarct (2019), Depression and BPH  Patient is out of bed sitting in manual wheelchair - actively self propelling using bilateral around unit - alert, cooperative, calm, pleasant and not in distress  Patient is verbal with clear coherent speech - oriented to name/birthday/date    Patient denies any acute medical concerns for this visit including pain, chest pain, shortness breath, headache, dizziness/vertigo, upper respiratory symptoms including coughing and sore throat, GI/ related symptoms, sleep/appetite/mood changes in a fever/chills  Per nursing no acute medical concerns described as stable and at baseline  Review of Systems:  Per history of present illness, all other systems reviewed and negative  HISTORY:  Medical Hx: Reviewed, unchanged  Family Hx: Reviewed, unchanged  Soc Hx: Reviewed,  unchanged    ALLERGY: Reviewed, unchanged  No Known Allergies     PHYSICAL EXAM:  Vital Signs: T97 1F -P65 -R18 BP: 129/78 SpO2: 99%RA  Weight:235 5 lbs (6/2/2021) <= 233 0 lbs (5/4/2021) <= 233 0 lbs (4/2/2021)    General: NAD  Obese  Head: Atraumatic  Normocephalic  Eye Exam: anicteric sclera, no discharge, PERRLA, No injection  Oral Exam: moist mucous membranes, no buccaloropharyngeal erythema, palatine tonsils WNL  Neck Exam: no anterior cervical lymphadenopathy noted, neck supple  Cardiovascular: regular rate, irregular rhythm, (+) murmur, rubs, or gallops  Pulmonary: no wheeze, no rhonchi, no rales  No chest tenderness  Abdominal: soft, non-tender, nondistended, bowel sounds audible x 4 quadrants  : Non distended bladder  Continent  Extremities and skin: no edema noted, no rashes  Intact skin  Neurological: alert, cooperative and responsive, Oriented x 3, moving all 4 extremities symmetrically - ambulatory dysfunction - wheelchair bound - self propel using feet  Laboratory results / Imaging reviewed: No new lab results to review at this time after 12/2020 results  Current Medications: All medications reviewed and updated in Nursing Home Chart    Please note:  Voice-recognition software may have been used in the preparation of this document  Occasional wrong word or "sound-alike" substitutions may have occurred due to the inherent limitations of voice recognition software  Interpretation should be guided by context      SYEDA Bonilla  6/30/2021

## 2021-06-30 PROBLEM — D64.9 ANEMIA: Status: ACTIVE | Noted: 2021-06-30

## 2021-06-30 PROBLEM — Z86.73 HISTORY OF CVA (CEREBROVASCULAR ACCIDENT): Status: ACTIVE | Noted: 2021-06-30

## 2021-06-30 NOTE — ASSESSMENT & PLAN NOTE
With anxiety component  Continue Buspirone 5mg BID + Escitalopram 10mg daily  Followed by Psychiatry 36.6

## 2021-06-30 NOTE — ASSESSMENT & PLAN NOTE
Pleasant and calm  Continue to redirect and reorient as often as needed  Reinforce information as often as needed  Continue MVI daily

## 2021-06-30 NOTE — ASSESSMENT & PLAN NOTE
Historically stable and controlled BP and HR  Continue ASA 81mg daily + Atorvastatin 80mg daily  Continue Depakote ER 250mg daily at [facility] on 7/5/2021

## 2021-06-30 NOTE — ASSESSMENT & PLAN NOTE
BP and HR stable and controlled  BP range (6/1-30/2021) = 111/63 to  147/98  HR range (6/1-30/2021) = 66 to 92/min  Continue Amlodipine 5mg daily

## 2021-07-05 ENCOUNTER — TELEPHONE (OUTPATIENT)
Dept: OTHER | Facility: OTHER | Age: 71
End: 2021-07-05

## 2021-08-13 ENCOUNTER — NURSING HOME VISIT (OUTPATIENT)
Dept: GERIATRICS | Facility: OTHER | Age: 71
End: 2021-08-13
Payer: COMMERCIAL

## 2021-08-13 DIAGNOSIS — R29.6 FALLS FREQUENTLY: ICD-10-CM

## 2021-08-13 DIAGNOSIS — N40.0 BENIGN PROSTATIC HYPERPLASIA, UNSPECIFIED WHETHER LOWER URINARY TRACT SYMPTOMS PRESENT: ICD-10-CM

## 2021-08-13 DIAGNOSIS — F32.A DEPRESSION, UNSPECIFIED DEPRESSION TYPE: ICD-10-CM

## 2021-08-13 DIAGNOSIS — I10 ESSENTIAL HYPERTENSION: ICD-10-CM

## 2021-08-13 DIAGNOSIS — I63.9 BASAL GANGLIA INFARCTION (HCC): ICD-10-CM

## 2021-08-13 DIAGNOSIS — Z86.73 HISTORY OF CVA (CEREBROVASCULAR ACCIDENT): ICD-10-CM

## 2021-08-13 DIAGNOSIS — K21.00 GASTROESOPHAGEAL REFLUX DISEASE WITH ESOPHAGITIS WITHOUT HEMORRHAGE: ICD-10-CM

## 2021-08-13 DIAGNOSIS — R26.2 AMBULATORY DYSFUNCTION: Primary | ICD-10-CM

## 2021-08-13 PROCEDURE — 99309 SBSQ NF CARE MODERATE MDM 30: CPT | Performed by: FAMILY MEDICINE

## 2021-08-13 RX ORDER — DIVALPROEX SODIUM 250 MG/1
250 TABLET, EXTENDED RELEASE ORAL
COMMUNITY

## 2021-08-13 RX ORDER — BUSPIRONE HYDROCHLORIDE 5 MG/1
5 TABLET ORAL DAILY
COMMUNITY
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
## 2021-10-15 ENCOUNTER — NURSING HOME VISIT (OUTPATIENT)
Dept: GERIATRICS | Facility: OTHER | Age: 71
End: 2021-10-15
Payer: COMMERCIAL

## 2021-10-15 DIAGNOSIS — D50.9 IRON DEFICIENCY ANEMIA, UNSPECIFIED IRON DEFICIENCY ANEMIA TYPE: ICD-10-CM

## 2021-10-15 DIAGNOSIS — M25.562 CHRONIC PAIN OF LEFT KNEE: ICD-10-CM

## 2021-10-15 DIAGNOSIS — Z86.73 HISTORY OF CVA (CEREBROVASCULAR ACCIDENT): Primary | ICD-10-CM

## 2021-10-15 DIAGNOSIS — G89.29 CHRONIC PAIN OF LEFT KNEE: ICD-10-CM

## 2021-10-15 DIAGNOSIS — G31.84 MILD COGNITIVE IMPAIRMENT: Chronic | ICD-10-CM

## 2021-10-15 DIAGNOSIS — N40.0 BENIGN PROSTATIC HYPERPLASIA, UNSPECIFIED WHETHER LOWER URINARY TRACT SYMPTOMS PRESENT: ICD-10-CM

## 2021-10-15 DIAGNOSIS — I10 PRIMARY HYPERTENSION: ICD-10-CM

## 2021-10-15 DIAGNOSIS — K21.00 GASTROESOPHAGEAL REFLUX DISEASE WITH ESOPHAGITIS WITHOUT HEMORRHAGE: ICD-10-CM

## 2021-10-15 DIAGNOSIS — R26.2 AMBULATORY DYSFUNCTION: ICD-10-CM

## 2021-10-15 DIAGNOSIS — F32.A DEPRESSION, UNSPECIFIED DEPRESSION TYPE: ICD-10-CM

## 2021-10-15 PROCEDURE — 99309 SBSQ NF CARE MODERATE MDM 30: CPT | Performed by: NURSE PRACTITIONER

## 2021-10-17 RX ORDER — ACETAMINOPHEN 325 MG/1
650 TABLET ORAL EVERY 6 HOURS PRN
COMMUNITY

## 2021-10-17 RX ORDER — NICOTINE POLACRILEX 2 MG
1 LOZENGE BUCCAL DAILY
COMMUNITY

## 2021-12-30 ENCOUNTER — NURSING HOME VISIT (OUTPATIENT)
Dept: GERIATRICS | Facility: OTHER | Age: 71
End: 2021-12-30
Payer: COMMERCIAL

## 2021-12-30 VITALS
DIASTOLIC BLOOD PRESSURE: 67 MMHG | BODY MASS INDEX: 45.65 KG/M2 | RESPIRATION RATE: 20 BRPM | SYSTOLIC BLOOD PRESSURE: 133 MMHG | HEART RATE: 81 BPM | WEIGHT: 249.6 LBS | TEMPERATURE: 97.3 F | OXYGEN SATURATION: 97 %

## 2021-12-30 DIAGNOSIS — K21.00 GASTROESOPHAGEAL REFLUX DISEASE WITH ESOPHAGITIS WITHOUT HEMORRHAGE: ICD-10-CM

## 2021-12-30 DIAGNOSIS — N40.0 BENIGN PROSTATIC HYPERPLASIA, UNSPECIFIED WHETHER LOWER URINARY TRACT SYMPTOMS PRESENT: ICD-10-CM

## 2021-12-30 DIAGNOSIS — Z86.73 HISTORY OF CVA (CEREBROVASCULAR ACCIDENT): Primary | ICD-10-CM

## 2021-12-30 DIAGNOSIS — I10 PRIMARY HYPERTENSION: ICD-10-CM

## 2021-12-30 DIAGNOSIS — R26.2 AMBULATORY DYSFUNCTION: ICD-10-CM

## 2021-12-30 DIAGNOSIS — G31.84 MILD COGNITIVE IMPAIRMENT: Chronic | ICD-10-CM

## 2021-12-30 DIAGNOSIS — D50.9 IRON DEFICIENCY ANEMIA, UNSPECIFIED IRON DEFICIENCY ANEMIA TYPE: ICD-10-CM

## 2021-12-30 PROCEDURE — 99309 SBSQ NF CARE MODERATE MDM 30: CPT | Performed by: FAMILY MEDICINE

## 2022-05-06 ENCOUNTER — NURSING HOME VISIT (OUTPATIENT)
Dept: GERIATRICS | Facility: OTHER | Age: 72
End: 2022-05-06
Payer: COMMERCIAL

## 2022-05-06 DIAGNOSIS — I10 PRIMARY HYPERTENSION: ICD-10-CM

## 2022-05-06 DIAGNOSIS — R26.2 AMBULATORY DYSFUNCTION: ICD-10-CM

## 2022-05-06 DIAGNOSIS — N40.0 BENIGN PROSTATIC HYPERPLASIA, UNSPECIFIED WHETHER LOWER URINARY TRACT SYMPTOMS PRESENT: ICD-10-CM

## 2022-05-06 DIAGNOSIS — G31.84 MILD COGNITIVE IMPAIRMENT: Chronic | ICD-10-CM

## 2022-05-06 DIAGNOSIS — Z86.73 HISTORY OF CVA (CEREBROVASCULAR ACCIDENT): Primary | ICD-10-CM

## 2022-05-06 DIAGNOSIS — F32.A DEPRESSION, UNSPECIFIED DEPRESSION TYPE: ICD-10-CM

## 2022-05-06 DIAGNOSIS — K21.00 GASTROESOPHAGEAL REFLUX DISEASE WITH ESOPHAGITIS WITHOUT HEMORRHAGE: ICD-10-CM

## 2022-05-06 DIAGNOSIS — D50.9 IRON DEFICIENCY ANEMIA, UNSPECIFIED IRON DEFICIENCY ANEMIA TYPE: ICD-10-CM

## 2022-05-06 PROCEDURE — 99309 SBSQ NF CARE MODERATE MDM 30: CPT | Performed by: NURSE PRACTITIONER

## 2022-05-06 NOTE — PROGRESS NOTES
[address], [address] [place] [address], [address] [place] [address]  [phone]    NAME: Nola Brito  AGE: 66 y o  SEX: male    Progress Note    Location:   POS: 32 ([facility])    Assessment/Plan:    History of CVA (cerebrovascular accident)  BP and HR stable  Continue ASA 81mg daily / Atorvastatin 80mg daily  Will continue to monitor for now      Mild cognitive impairment  Pleasant and calm  AO x3  Continue to redirect and reorient as often as needed  Reinforce information as often as needed     HTN (hypertension)  BP and HR stable and controlled  BP range (4/28/2022 to 5/6/2022) = 114/70 to 159/93  HR range (4/28/2022 to 5/6/2022) = 54 to 93/min  Continue Amlodipine 5mg daily     Depression  Followed by Psychiatry (Dr Robert Witt)  Doing well with current medications  Continue Buspirone 5mg daily/ Escitalopram 10mg daily  Patient denies sleep/mood/appetite and behavior concerns on this visit      BPH (benign prostatic hyperplasia)  Stable  Continue Tamsulosin 0 4mg daily at HS     Gastroesophageal reflux disease with esophagitis  Asymptomatic  Continue Pantoprazole 40mg daily + Sucralfate 1G with meals     Anemia   Hbg/Hct stable (4/12/2022)  Continue Theragran-M daily  Maintains a very good meal completion: 100%     Ambulatory dysfunction  Patient is wheelchair bound - self propels  Fall precaution    Chief complaint / Reason for visit: Follow-up visit    History of Present Illness: This is a 66 y o  Male patient admitted at [facility] for debility  Patient is seen and examined today to follow-up acute and chronic medical conditions: MCI, Hx of Thalamic Infarct, HTN, BPH, Depression, GERD, Anemia and ambulatory dysfunction      Patient is OOB for this visit - alert, cooperative, calm, pleasant and not in distress  Patient is verbal with clear coherent speech - oriented to name/birthday/current date and place   Patient acknowledged feeling well on this visit - denies any acute medical concerns during ROS assessment including pain  Per nursing, no acute medical concerns for this visit - described as stable and at baseline  Review of Systems:  Per history of present illness, all other systems reviewed and negative  HISTORY:  Medical Hx: Reviewed, unchanged  Family Hx: Reviewed, unchanged  Soc Hx: Reviewed,  unchanged    ALLERGY: Reviewed, unchanged  No Known Allergies     PHYSICAL EXAM:  Vital Signs: T96 4F -P93 -R17 BP: 118/78 SpO2: 92% RA  Weight: 248 6 lbs (5/1/2022) <= 248 5 lbs (4/1/2022) <= 253 5 lbs (3/2/2022)    General: NAD  Obese  Head: Atraumatic  Normocephalic  Eye Exam: anicteric sclera, no discharge, PERRLA, No injection  Oral Exam: moist mucous membranes, no buccaloropharyngeal erythema, palatine tonsils WNL  Neck Exam: no anterior cervical lymphadenopathy noted, neck supple  Cardiovascular: regular rate, regular rhythm, no murmurs, rubs, or gallops  Muted heart sounds  Pulmonary: no wheeze, no rhonchi, no rales  No chest tenderness  Abdominal: soft, non-tender, nondistended, bowel sounds audible x 4 quadrants  Ave meal completion: 100%  : Non distended bladder  Incontinent  Daily BM: 1-2 /day  Last documented BM: 5/6/2022   Extremities and skin: B/L LE edema (+2) - dermatitis or cellulitis noted  No rashes  Intact skin  Neurological: alert, cooperative and responsive, Oriented x 3, moving all 4 extremities symmetrically  Ambulatory dysfunction - wheelchair bound      Laboratory results / Imaging reviewed: Hard copy/ies in medical chart:    * CBC wo diff (4/12/2022):   HEMOGLOBIN 9 6 g/dL 12 5-17 0 L Final         HEMATOCRIT 29 8 % 37 0-48 0 L Final         WBC 9 9 thou/cmm 4 0-10 5  Final         RBC 3 82 mill/cmm 4 00-5 40 L Final         PLATELET COUNT 419 thou/cmm 140-350 H Final         MPV 9 0 fL 7 5-11 3  Final         MCV 78 fL  L Final         MCH 25 1 pg 27 0-36 0 L Final         MCHC 32 3 g/dL 32 0-37 0  Final         RDW 16 7 % 12 0-16 0 H Final       * BMP (4/12/2022):   GLUCOSE 110 mg/dL 65-99 H Final         BUN 17 mg/dL 7-28  Final         CREATININE 0 79 mg/dL 0 53-1 30  Final         SODIUM 143 mmol/L 135-145  Final         POTASSIUM 4 8 mmol/L 3 5-5 2  Final         CHLORIDE 112 mmol/L 100-109 H Final         CARBON DIOXIDE 25 mmol/L 23-31  Final         CALCIUM 8 6 mg/dL 8 5-10 1  Final         ANION GAP 6  3-11  Final         eGFRcr 94  >59  Final         eGFRcr COMMENT (Note)    Final       Current Medications: All medications reviewed and updated in Nursing Home Chart    Please note:  Voice-recognition software may have been used in the preparation of this document  Occasional wrong word or "sound-alike" substitutions may have occurred due to the inherent limitations of voice recognition software  Interpretation should be guided by context      SYEDA Fox  5/6/2022

## 2022-06-25 ENCOUNTER — TELEPHONE (OUTPATIENT)
Dept: OTHER | Facility: OTHER | Age: 72
End: 2022-06-25

## 2022-07-08 ENCOUNTER — NURSING HOME VISIT (OUTPATIENT)
Dept: GERIATRICS | Facility: OTHER | Age: 72
End: 2022-07-08
Payer: COMMERCIAL

## 2022-07-08 VITALS
DIASTOLIC BLOOD PRESSURE: 82 MMHG | OXYGEN SATURATION: 95 % | WEIGHT: 250 LBS | RESPIRATION RATE: 16 BRPM | BODY MASS INDEX: 45.73 KG/M2 | SYSTOLIC BLOOD PRESSURE: 138 MMHG | TEMPERATURE: 97.2 F | HEART RATE: 71 BPM

## 2022-07-08 DIAGNOSIS — R29.6 FALLS FREQUENTLY: ICD-10-CM

## 2022-07-08 DIAGNOSIS — G31.84 MILD COGNITIVE IMPAIRMENT: Chronic | ICD-10-CM

## 2022-07-08 DIAGNOSIS — K21.00 GASTROESOPHAGEAL REFLUX DISEASE WITH ESOPHAGITIS WITHOUT HEMORRHAGE: Primary | ICD-10-CM

## 2022-07-08 DIAGNOSIS — D50.9 IRON DEFICIENCY ANEMIA, UNSPECIFIED IRON DEFICIENCY ANEMIA TYPE: ICD-10-CM

## 2022-07-08 DIAGNOSIS — Z86.73 HISTORY OF CVA (CEREBROVASCULAR ACCIDENT): ICD-10-CM

## 2022-07-08 DIAGNOSIS — N40.0 BENIGN PROSTATIC HYPERPLASIA, UNSPECIFIED WHETHER LOWER URINARY TRACT SYMPTOMS PRESENT: ICD-10-CM

## 2022-07-08 DIAGNOSIS — I10 PRIMARY HYPERTENSION: ICD-10-CM

## 2022-07-08 DIAGNOSIS — F32.A DEPRESSION, UNSPECIFIED DEPRESSION TYPE: ICD-10-CM

## 2022-07-08 DIAGNOSIS — R26.2 AMBULATORY DYSFUNCTION: ICD-10-CM

## 2022-07-08 PROCEDURE — 99309 SBSQ NF CARE MODERATE MDM 30: CPT | Performed by: INTERNAL MEDICINE
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
## 2022-07-08 NOTE — ASSESSMENT & PLAN NOTE
· Hx of CVA  · Using wheelchair   · Encourage patient to get out of bed   · Continue supportive care   · Maintain fall precautions

## 2022-07-08 NOTE — ASSESSMENT & PLAN NOTE
· Continue to re-orient as needed   Provide redirection and reorientation as needed  Fall Precautions  Assist with ADLs/IADLs  Avoid deliriogenic medications such as tramadol, benzodiazepines, anticholinergics, benadryl  Encourage Hydration/ Nutrition  Implement sleep hygiene, limit night time interuptions   Encourage participation in group activities

## 2022-07-08 NOTE — ASSESSMENT & PLAN NOTE
With anxiety component  Continue Buspirone 5mg BID + Escitalopram 10mg daily  Followed by Psychiatry

## 2022-09-02 ENCOUNTER — NURSING HOME VISIT (OUTPATIENT)
Dept: GERIATRICS | Facility: OTHER | Age: 72
End: 2022-09-02
Payer: COMMERCIAL

## 2022-09-02 DIAGNOSIS — R26.2 AMBULATORY DYSFUNCTION: ICD-10-CM

## 2022-09-02 DIAGNOSIS — F32.A DEPRESSION, UNSPECIFIED DEPRESSION TYPE: ICD-10-CM

## 2022-09-02 DIAGNOSIS — E66.01 MORBID OBESITY WITH BMI OF 45.0-49.9, ADULT (HCC): Chronic | ICD-10-CM

## 2022-09-02 DIAGNOSIS — D50.9 IRON DEFICIENCY ANEMIA, UNSPECIFIED IRON DEFICIENCY ANEMIA TYPE: ICD-10-CM

## 2022-09-02 DIAGNOSIS — Z86.73 HISTORY OF CVA (CEREBROVASCULAR ACCIDENT): ICD-10-CM

## 2022-09-02 DIAGNOSIS — G31.84 MILD COGNITIVE IMPAIRMENT: Chronic | ICD-10-CM

## 2022-09-02 DIAGNOSIS — R29.6 FALLS FREQUENTLY: ICD-10-CM

## 2022-09-02 DIAGNOSIS — N40.0 BENIGN PROSTATIC HYPERPLASIA, UNSPECIFIED WHETHER LOWER URINARY TRACT SYMPTOMS PRESENT: ICD-10-CM

## 2022-09-02 DIAGNOSIS — K21.00 GASTROESOPHAGEAL REFLUX DISEASE WITH ESOPHAGITIS WITHOUT HEMORRHAGE: Primary | ICD-10-CM

## 2022-09-02 PROCEDURE — 99309 SBSQ NF CARE MODERATE MDM 30: CPT | Performed by: NURSE PRACTITIONER

## 2022-09-04 VITALS — HEIGHT: 63 IN | BODY MASS INDEX: 45.71 KG/M2 | WEIGHT: 258 LBS

## 2022-09-05 NOTE — ASSESSMENT & PLAN NOTE
Continue fall precaution  Encourage patient to call for assistance when getting out of bed or chair   Call bell within reach  PT/OT as needed per facility protocol

## 2022-09-05 NOTE — PROGRESS NOTES
[address], [address] [place] [address], [address] [place] [address]  [phone]    NAME: Ofelia Scott  AGE: 66 y o  SEX: male    Progress Note    Location: [place]  POS: 32 (LTC)    Assessment/Plan:    Gastroesophageal reflux disease with esophagitis  Stable  Denies N/V/D/C   Tolerating diet with fair appetite  C/w pantoprazole 40 mg daily and sucralfate 1 g with meals    BPH (benign prostatic hyperplasia)  Denies GI discomfort- dysuria and hematuria  C/w tamulosin 0 4 mg qhs    Mild cognitive impairment  On exam AOx3  Continue with continue to provide redirection and reorientation as needed   Continue to implement some fall and safety precaution   Assist with ADLs/IADLs  Continue with supportive care   Avoid deliriogenic medications such as tramadol, benzodiazepine, anticholinergics, Benadryl   Encourage adequate p o  hydration and nutrition  Implement sleep hygiene, limit nighttime eruption   Encourage participation in group activities   Delirium protocol as needed     Morbid obesity with BMI of 45 0-49 9, adult ([facility] [address] )  Recent weight 250 lb on 09/01/2022, from 08/10/2021 patient gain 5 5 lb  Body mass index is 45 7 kg/m²    Remains on low-sodium cardiac diet regular texture  Dietitian following  Encourage daily activity  Continue to monitor weight monthly    Ambulatory dysfunction  Multifactorial  With known history of CVA  Wheelchair-bound  Out of bed for meals as tolerated   Continue with supportive can examine 10 fall and safety precaution   Encourage adequate p o  hydration and nutrition  Optimize acute and chronic medical condition as outlined   PT/OT per facility protocol    Falls frequently  Continue fall precaution  Encourage patient to call for assistance when getting out of bed or chair   Call bell within reach  PT/OT as needed per facility protocol     History of CVA (cerebrovascular accident)  Stable   Continue aspirin 81 mg and atorvastatin 80 mg daily   Continue monitoring vitals    Depression  Mood stable with no no behavior disturbance reported   Continue BuSpar 5 mg daily and Lexapro 5 mg daily  Monitor    Anemia  Recent hemoglobin low however stable   No active bleeding noted or reported   Continue multivitamin and supplement  Continue to monitor CBC periodically    Chief complaint / Reason for visit: [facility] montlhly F/U     Patient's care was coordinated with nursing facility staff  Recent vitals, labs, and updated medications were review on [facility] system in facility  History of Present Illness:  Bobette Dubin is a 76-year-old male long term care resident of [address] [place] [address]  seen and examined for [facility] monthly f/u  He is being seen today for his acute and chronic medical condition  His past medical history includes mCi, CVA, hypertension, BPH, depression, GERD, anemia, debility, and ambulatory dysfunction  Received patient lying in bed, resting  Appears comfortable and is in no acute distress  Offers no complaints  Wheel chair bound  Dependent for ADL's/IADLs  Incontinent of bowel and bladder  Tolerating diet with fair appetite  Sleeping okay  On exam he is doing okay  States he participated in facility's activities as tolerated  Denies /GI discomfort  Per nursing no other concerns or issues at this time  Review of Systems:  Per history of present illness, all other systems reviewed and negative  Other than those noted in HPI    HISTORY:  Medical Hx: Reviewed, unchanged  Family Hx: Reviewed, unchanged  Soc Hx: Reviewed,  unchanged    ALLERGY: Reviewed, unchanged  No Known Allergies     PHYSICAL EXAM:  Vital Signs:  Blood pressure 142/70, pulse 78, respiration 18, temperature 97 7, O2 sats 97% room air  Weight:  258 lb    General: appears obese, weak, pleasant and cooperative   Head: Atraumatic  Normocephalic    Eye Exam: anicteric sclera, no discharge, PERRLA, No injection  Oral Exam: moist mucous membranes, no buccaloropharyngeal erythema, palatine tonsils WNL  Neck Exam: no anterior cervical lymphadenopathy noted, neck supple  Cardiovascular: regular rate, regular rhythm, no murmurs, rubs, or gallops  Pulmonary: no wheeze, no rhonchi, no rales  No chest tenderness  Abdominal: soft, rounded, non-tender, nondistended, bowel sounds audible x 4 quadrants  : Non distended bladder  Incontinent of bowel and bladder  Extremities and skin: BLE edema noted, no rashes  Neurological: alert, cooperative and responsive, Oriented x 3, moving all 4 extremities symmetrically    Laboratory results / Imaging reviewed: Hard copy/ies in medical chart:   Reviewed from [facility]  CBC-BMP reviewed from 04/12/2022   Hemoglobin 9 6, hematocrit 29 8, WBC 9 9, platelets 444, blood glucose 110, BUN 17, creatinine 0 79, sodium 143, potassium 4 8, calcium 8 6, and EGFR 94    Current Medications: All medications reviewed and updated in Nursing Home Chart from [facility]    Please note:  Voice-recognition software may have been used in the preparation of this document  Occasional wrong word or "sound-alike" substitutions may have occurred due to the inherent limitations of voice recognition software  Interpretation should be guided by context      SYEDA Simental  9//2022

## 2022-09-05 NOTE — ASSESSMENT & PLAN NOTE
Multifactorial  With known history of CVA  Wheelchair-bound  Out of bed for meals as tolerated   Continue with supportive can examine 10 fall and safety precaution   Encourage adequate p o  hydration and nutrition  Optimize acute and chronic medical condition as outlined   PT/OT per facility protocol

## 2022-09-05 NOTE — ASSESSMENT & PLAN NOTE
Stable  Denies N/V/D/C   Tolerating diet with fair appetite  C/w pantoprazole 40 mg daily and sucralfate 1 g with meals

## 2022-09-05 NOTE — ASSESSMENT & PLAN NOTE
Recent hemoglobin low however stable   No active bleeding noted or reported   Continue multivitamin and supplement  Continue to monitor CBC periodically

## 2022-09-05 NOTE — ASSESSMENT & PLAN NOTE
On exam AOx3  Continue with continue to provide redirection and reorientation as needed   Continue to implement some fall and safety precaution   Assist with ADLs/IADLs  Continue with supportive care   Avoid deliriogenic medications such as tramadol, benzodiazepine, anticholinergics, Benadryl   Encourage adequate p o  hydration and nutrition  Implement sleep hygiene, limit nighttime eruption   Encourage participation in group activities   Delirium protocol as needed

## 2022-09-05 NOTE — ASSESSMENT & PLAN NOTE
Recent weight 250 lb on 09/01/2022, from 08/10/2021 patient gain 5 5 lb  Body mass index is 45 7 kg/m²    Remains on low-sodium cardiac diet regular texture  Dietitian following  Encourage daily activity  Continue to monitor weight monthly

## 2022-09-05 NOTE — ASSESSMENT & PLAN NOTE
Mood stable with no no behavior disturbance reported   Continue BuSpar 5 mg daily and Lexapro 5 mg daily  Monitor

## 2022-11-23 ENCOUNTER — NURSING HOME VISIT (OUTPATIENT)
Dept: GERIATRICS | Facility: OTHER | Age: 72
End: 2022-11-23

## 2022-11-23 DIAGNOSIS — D50.9 IRON DEFICIENCY ANEMIA, UNSPECIFIED IRON DEFICIENCY ANEMIA TYPE: ICD-10-CM

## 2022-11-23 DIAGNOSIS — I63.81 BASAL GANGLIA INFARCTION (HCC): ICD-10-CM

## 2022-11-23 DIAGNOSIS — G31.84 MILD COGNITIVE IMPAIRMENT: Primary | Chronic | ICD-10-CM

## 2022-11-23 DIAGNOSIS — N40.0 BENIGN PROSTATIC HYPERPLASIA, UNSPECIFIED WHETHER LOWER URINARY TRACT SYMPTOMS PRESENT: ICD-10-CM

## 2022-11-23 DIAGNOSIS — K21.00 GASTROESOPHAGEAL REFLUX DISEASE WITH ESOPHAGITIS WITHOUT HEMORRHAGE: ICD-10-CM

## 2022-11-23 DIAGNOSIS — F32.A DEPRESSION, UNSPECIFIED DEPRESSION TYPE: ICD-10-CM

## 2022-11-23 NOTE — PROGRESS NOTES
[facility]  [address]  [phone]  [facility]  [address]      NAME: Surya Mccoy  AGE: 66 y o  SEX: male [id number]    DATE OF ENCOUNTER: 11/23/2022    Assessment and Plan     1  Mild cognitive impairment        2  Gastroesophageal reflux disease with esophagitis without hemorrhage        3  Basal ganglia infarction ([address] )        4  Iron deficiency anemia, unspecified iron deficiency anemia type        5  Benign prostatic hyperplasia, unspecified whether lower urinary tract symptoms present        6  Depression, unspecified depression type           Mild cognitive impairment  Pt alert and oriented today  Cooperative  Avoid antideliriogenics  Fall precautions  PT/OT prn  Pt has private room    GERD  Cont pantoprazole 40 mg 1 tab po daily  Cont sucralfate 1 g with meals    Hx CVA  Pt wheel chair bound  Cont aspirin 81 mg 1 tab po daily  Cont atorvastatin 80 mg 1 tab po QHS    Anemia  04/12/2022 9 6/22 8    BPH  Cont tamulosin 0 4 mg 1 tab po QHS    Depression  Cont lexapro 5 mg 1 tab po daily  Cont buspar 5 mg 1 tab po daily    Code status: Full code    Chief Complaint     Routine Long term follow-up visit  History of Present Illness   65 y/o M with PMH CVA, mild cognitive impairment, HTN, BPH, depression, GERD, and anemia  Pt seen and examined as 60 day long term followup visit  Pt has no complaints today  Pt anxioius to go around the STR in his wheelchair  The following portions of the patient's history were reviewed and updated as appropriate: allergies, current medications, past family history, past medical history, past social history, past surgical history and problem list     Review of Systems     Review of Systems   Constitutional: Negative for chills and fever  HENT: Negative for sore throat  Respiratory: Negative for cough and shortness of breath  Cardiovascular: Negative for chest pain     Gastrointestinal: Negative for nausea and vomiting  Neurological: Negative for weakness and headaches  Active Problem List     Patient Active Problem List   Diagnosis   • Multiple injuries   • Ambulatory dysfunction   • Falls frequently   • Prediabetes   • Non-ST elevation myocardial infarction (NSTEMI), type 2   • HTN (hypertension)   • Mild cognitive impairment   • Morbid obesity with BMI of 45 0-49 9, adult ([facility])   • Knee pain   • Multiple wounds of skin   • Thalamic infarct, acute ([facility])   • Basal ganglia infarction ([facility] [address] )   • Pneumonia due to COVID-19 virus   • Goals of care, counseling/discussion   • Gastroesophageal reflux disease with esophagitis   • Depression   • BPH (benign prostatic hyperplasia)   • History of CVA (cerebrovascular accident)   • Anemia       Objective     Vitals:  206 6 Lbs 11/22/2022 20:59    Blood Pressure: 124 /  64 mmHg 11/22/2022    Temperature: 98 6 °F 11/23/2022    Pulse: 60 bpm 11/22/2022    Respirations: 20 Breaths/min 11/22/2022    aO2 Saturation: 98 0 % 11/23/2022    Height: 69 0 Inches         Physical Exam  Constitutional:       General: He is not in acute distress  Appearance: He is not ill-appearing  HENT:      Head: Normocephalic and atraumatic  Cardiovascular:      Rate and Rhythm: Normal rate  Heart sounds: Normal heart sounds  No murmur heard  No friction rub  Pulmonary:      Effort: No respiratory distress  Breath sounds: Normal breath sounds  No wheezing  Abdominal:      General: Bowel sounds are normal  There is no distension  Palpations: Abdomen is soft  Tenderness: There is no abdominal tenderness  Skin:     Comments: Pt with chronic looking rash on his left shin  BLE edema 1+   Neurological:      Mental Status: He is alert  Psychiatric:         Mood and Affect: Mood normal          Behavior: Behavior normal        Pertinent Laboratory/Diagnostic Studies:  Refer to facility chart      Current Medications   Medications reviewed and updated in facility chart      Eliz Yoo MD  Internal Medicine  Senior Care Physician

## 2023-01-31 ENCOUNTER — NURSING HOME VISIT (OUTPATIENT)
Dept: GERIATRICS | Facility: OTHER | Age: 73
End: 2023-01-31

## 2023-01-31 VITALS
RESPIRATION RATE: 18 BRPM | BODY MASS INDEX: 46.43 KG/M2 | TEMPERATURE: 98.2 F | SYSTOLIC BLOOD PRESSURE: 132 MMHG | DIASTOLIC BLOOD PRESSURE: 62 MMHG | OXYGEN SATURATION: 98 % | WEIGHT: 262.1 LBS | HEART RATE: 76 BPM

## 2023-01-31 DIAGNOSIS — N40.0 BENIGN PROSTATIC HYPERPLASIA, UNSPECIFIED WHETHER LOWER URINARY TRACT SYMPTOMS PRESENT: ICD-10-CM

## 2023-01-31 DIAGNOSIS — I10 PRIMARY HYPERTENSION: ICD-10-CM

## 2023-01-31 DIAGNOSIS — R60.0 BILATERAL LOWER EXTREMITY EDEMA: Chronic | ICD-10-CM

## 2023-01-31 DIAGNOSIS — Z86.73 HISTORY OF CVA (CEREBROVASCULAR ACCIDENT): ICD-10-CM

## 2023-01-31 DIAGNOSIS — G31.84 MILD COGNITIVE IMPAIRMENT: Chronic | ICD-10-CM

## 2023-01-31 DIAGNOSIS — D50.9 IRON DEFICIENCY ANEMIA, UNSPECIFIED IRON DEFICIENCY ANEMIA TYPE: ICD-10-CM

## 2023-01-31 DIAGNOSIS — R06.2 EXPIRATORY WHEEZING: ICD-10-CM

## 2023-01-31 DIAGNOSIS — F32.A DEPRESSION, UNSPECIFIED DEPRESSION TYPE: ICD-10-CM

## 2023-01-31 DIAGNOSIS — R26.2 AMBULATORY DYSFUNCTION: ICD-10-CM

## 2023-01-31 DIAGNOSIS — I21.A1 TYPE 2 MYOCARDIAL INFARCTION WITHOUT ST ELEVATION (HCC): Primary | ICD-10-CM

## 2023-02-01 PROBLEM — R06.2 EXPIRATORY WHEEZING: Status: ACTIVE | Noted: 2023-02-01

## 2023-02-01 PROBLEM — R60.0 BILATERAL LOWER EXTREMITY EDEMA: Chronic | Status: ACTIVE | Noted: 2023-02-01

## 2023-02-01 NOTE — ASSESSMENT & PLAN NOTE
Patient noted to have some b/l expiratory wheezes on ascultation along with b/l LE edema  Patient does not feel wheezy and denies any SOB  No hx  Of HF although pt  Does have hx   Of NSTEMI  Last ECHO in 2019 EF 60%  Initiate lasix 20mg PO daily  Order albuterol HFA q 6 PRN  F/u with labs

## 2023-02-01 NOTE — ASSESSMENT & PLAN NOTE
Last hgb 9 6 4/12/22  Patient is at baseline  Continue multivitamin PO daily  Trend CBC periodically

## 2023-02-01 NOTE — ASSESSMENT & PLAN NOTE
Multifactoral- hx  Of CVA, mild cognitive impairment, b/l LE swelling, physical deconditioning  Patient uses wheelchair at baseline  Encourage assistance with ambulation and ADLs  Maintain fall precautions  Continue supportive care

## 2023-02-01 NOTE — ASSESSMENT & PLAN NOTE
Patient has hx   Of NSETMI 2017  No complaints of CP or SOB at this time  Continue aspirin and atorvastatin at current doses

## 2023-02-01 NOTE — ASSESSMENT & PLAN NOTE
Stable  Continue aspirin and atorvastatin at current doses  Supportive care  Continue to monitor vitals and signs and symptoms

## 2023-02-01 NOTE — ASSESSMENT & PLAN NOTE
Patient noted to have +1 pitting edema to b/l LE, states that this is chronic  Hx  Of NSTEMI, no hx  Of HF noted  ECHO from 2019 with EF 60%  Will monitor weights x1 week  Order angelica stockings on in morning, off at night  Order lasix 20mg PO daily  Will order CBC, CMP, BNP, d  dimer for 2/2/23  Order vitals q   Shift x3 days while starting lasix  Recheck BMP in one week

## 2023-02-01 NOTE — ASSESSMENT & PLAN NOTE
Patient pleasant on exam, mood stable  Continue buspirone 5mg PO daily  Monitor for increase behaviors/aggitation

## 2023-02-01 NOTE — ASSESSMENT & PLAN NOTE
BP log reviewed- stable and at goal  Continue amlodipine 5mg PO daily  Continue to monitor vitals per unit protocol

## 2023-02-01 NOTE — PROGRESS NOTES
[facility]  [address]  [phone]  FACILITY: [facility]  [address]        NAME: Deidre Villafana  AGE: 67 y o  SEX: male CODE STATUS: CPR    DATE OF ENCOUNTER: 2/1/2023    Assessment and Plan     1  Non-ST elevation myocardial infarction (NSTEMI), type 2  Assessment & Plan:  Patient has hx  Of NSETMI 2017  No complaints of CP or SOB at this time  Continue aspirin and atorvastatin at current doses      2  Primary hypertension  Assessment & Plan:  BP log reviewed- stable and at goal  Continue amlodipine 5mg PO daily  Continue to monitor vitals per unit protocol      3  Benign prostatic hyperplasia, unspecified whether lower urinary tract symptoms present  Assessment & Plan:  Denies any urinary complaints at this time  Continue tamsulosin 0 4mg PO daily      4  Mild cognitive impairment  Assessment & Plan:  Patient alert and oriented x3 on exam today  Continue supportive care  Reorient and redirect as needed  Continue practicing sleep hygiene  Encourage assistance with ambulation and ADLs  Encourage participation in group activities      5  Ambulatory dysfunction  Assessment & Plan:  Multifactoral- hx  Of CVA, mild cognitive impairment, b/l LE swelling, physical deconditioning  Patient uses wheelchair at baseline  Encourage assistance with ambulation and ADLs  Maintain fall precautions  Continue supportive care      6  History of CVA (cerebrovascular accident)  Assessment & Plan:  Stable  Continue aspirin and atorvastatin at current doses  Supportive care  Continue to monitor vitals and signs and symptoms      7  Iron deficiency anemia, unspecified iron deficiency anemia type  Assessment & Plan:  Last hgb 9 6 4/12/22  Patient is at baseline  Continue multivitamin PO daily  Trend CBC periodically      8   Depression, unspecified depression type  Assessment & Plan:  Patient pleasant on exam, mood stable  Continue buspirone 5mg PO daily  Monitor for increase behaviors/aggitation      9  Bilateral lower extremity edema  Assessment & Plan:  Patient noted to have +1 pitting edema to b/l LE, states that this is chronic  Hx  Of NSTEMI, no hx  Of HF noted  ECHO from 2019 with EF 60%  Will monitor weights x1 week  Order angelica stockings on in morning, off at night  Order lasix 20mg PO daily  Will order CBC, CMP, BNP, d  dimer for 2/2/23  Order vitals q  Shift x3 days while starting lasix  Recheck BMP in one week        10  Expiratory wheezing  Assessment & Plan:  Patient noted to have some b/l expiratory wheezes on ascultation along with b/l LE edema  Patient does not feel wheezy and denies any SOB  No hx  Of HF although pt  Does have hx  Of NSTEMI  Last ECHO in 2019 EF 60%  Initiate lasix 20mg PO daily  Order albuterol HFA q 6 PRN  F/u with labs         All medications and routine orders were reviewed and updated as needed  Chief Complaint     LTC follow up visit     Past Medical and Surgica History      Past Medical History:   Diagnosis Date   • Anemia    • Anxiety    • Groves's esophagus    • BPH (benign prostatic hyperplasia)    • COVID-19    • Depression    • Esophageal ulcer    • Hematemesis    • Hyperlipidemia    • Hypertension    • MCI (mild cognitive impairment)    • Muscle weakness    • TIA (transient ischemic attack)      No past surgical history on file  No Known Allergies       History of Present Illness     Loki Yepez is a 67year old male residing in the long term care unit at [address] [place] [address]  He is seen today for routine follow up for acute on chronic conditions  He has a PMH including but not limited to GERD, HTN, NSTEMI, BPH, CVA, mild cognitive impairment, anemia, and ambulatory dysfunction  The patient was seen and examined at bedside today in stable condition  He did not have any physical complaints at this time  He was resting comfortably in her wheelchair watching TV  He is alert and oriented x3   Upon exam, the patient had some mild b/l expiratory wheezes  He denies any SOB  Patient also had some b/l LE pitting edema which he states is his baseline  He denies CP/SOB/N/V/D  Denies lightheadedness, dizziness, headaches, vision changes  States he is eating well and staying hydrated  Denies any bowel or bladder issues  He uses a wheelchair at baseline  No concerns from nursing at this time  The patient's allergies, past medical, surgical, social and family history were reviewed and unchanged  Review of Systems     Review of Systems   Constitutional: Negative  Negative for appetite change, chills, fatigue and fever  HENT: Negative  Negative for congestion, facial swelling, rhinorrhea, sneezing and sore throat  Eyes: Negative  Negative for redness, itching and visual disturbance  Respiratory: Positive for wheezing  Negative for cough, chest tightness and shortness of breath  B/l expiratory wheeze   Cardiovascular: Positive for leg swelling  Negative for chest pain and palpitations  Chronic, at baseline   Gastrointestinal: Negative for abdominal distention, abdominal pain, constipation, nausea and vomiting  Genitourinary: Negative  Negative for difficulty urinating, flank pain, frequency and hematuria  Musculoskeletal: Positive for gait problem  Negative for arthralgias, back pain, myalgias and neck stiffness  Uses wheelchair   Skin: Negative for pallor, rash and wound  Neurological: Negative for dizziness, weakness, light-headedness, numbness and headaches  Psychiatric/Behavioral: Negative for agitation, confusion and sleep disturbance  The patient is not nervous/anxious  Objective     Vitals:   Vitals:    01/31/23 2128   BP: 132/62   Pulse: 76   Resp: 18   Temp: 98 2 °F (36 8 °C)   SpO2: 98%         Physical Exam  Vitals reviewed  Constitutional:       General: He is not in acute distress  Appearance: Normal appearance  He is not ill-appearing, toxic-appearing or diaphoretic     HENT: Head: Normocephalic and atraumatic  Right Ear: External ear normal       Left Ear: External ear normal       Nose: Nose normal  No congestion or rhinorrhea  Mouth/Throat:      Mouth: Mucous membranes are moist       Pharynx: Oropharynx is clear  No oropharyngeal exudate or posterior oropharyngeal erythema  Eyes:      General:         Right eye: No discharge  Left eye: No discharge  Extraocular Movements: Extraocular movements intact  Conjunctiva/sclera: Conjunctivae normal       Pupils: Pupils are equal, round, and reactive to light  Cardiovascular:      Rate and Rhythm: Normal rate and regular rhythm  Pulses: Normal pulses  Heart sounds: Normal heart sounds  No murmur heard  No friction rub  No gallop  Pulmonary:      Effort: Pulmonary effort is normal  No respiratory distress  Breath sounds: Wheezing present  Comments: B/l expiratory wheeze  Chest:      Chest wall: No tenderness  Abdominal:      General: Abdomen is flat  Bowel sounds are normal  There is no distension  Palpations: Abdomen is soft  There is no mass  Tenderness: There is no abdominal tenderness  There is no guarding  Musculoskeletal:         General: No swelling or tenderness  Normal range of motion  Cervical back: Normal range of motion and neck supple  No rigidity or tenderness  Right lower leg: Edema present  Left lower leg: Edema present  Comments: +1 pitting,at baseline per pt  Skin:     General: Skin is warm and dry  Coloration: Skin is not jaundiced  Findings: No bruising, erythema or rash  Neurological:      General: No focal deficit present  Mental Status: He is alert and oriented to person, place, and time  Mental status is at baseline  Sensory: No sensory deficit  Motor: No weakness        Coordination: Coordination normal       Gait: Gait normal    Psychiatric:         Mood and Affect: Mood normal          Behavior: Behavior normal          Thought Content: Thought content normal          Judgment: Judgment normal          Pertinent Laboratory/Diagnostic Studies:   Reviewed in facility chart-stable    HGB 9 6  PLTS 368  WBC 9 9    BUN 17  CREAT 0 79    K 4 8      Current Medications   Medications reviewed and updated see facility [facility] for details  Current Outpatient Medications:   •  acetaminophen (TYLENOL) 325 mg tablet, Take 650 mg by mouth every 6 (six) hours as needed, Disp: , Rfl:   •  amLODIPine (NORVASC) 5 mg tablet, Take 1 tablet (5 mg total) by mouth daily, Disp: 30 tablet, Rfl: 0  •  aspirin 81 mg chewable tablet, Chew 1 tablet (81 mg total) daily, Disp: 60 tablet, Rfl: 0  •  atorvastatin (LIPITOR) 80 mg tablet, Take 1 tablet (80 mg total) by mouth daily with dinner, Disp: 60 tablet, Rfl: 0  •  busPIRone (BUSPAR) 5 mg tablet, Take 5 mg by mouth daily, Disp: , Rfl:   •  divalproex sodium (DEPAKOTE ER) 250 mg 24 hr tablet, Take 250 mg by mouth daily at bedtime, Disp: , Rfl:   •  escitalopram (LEXAPRO) 10 mg tablet, Take 5 mg by mouth daily , Disp: , Rfl:   •  multivitamin-iron-minerals-folic acid (THERAPEUTIC-M) TABS tablet, Take 1 tablet by mouth daily, Disp: , Rfl:   •  pantoprazole (PROTONIX) 40 mg tablet, Take 40 mg by mouth daily, Disp: , Rfl:   •  polyethylene glycol (MIRALAX) 17 g packet, Take 17 g by mouth daily as needed, Disp: , Rfl:   •  sucralfate (CARAFATE) 1 g tablet, Take 1 g by mouth 3 (three) times a day before meals, Disp: , Rfl:   •  tamsulosin (FLOMAX) 0 4 mg, Take 0 4 mg by mouth daily with dinner, Disp: , Rfl:      Plan discussed with Dr Benjamen Litten Dr Benjamen Litten noted agreement with assessment and plan  Please note:  Voice-recognition software may have been used in the preparation of this document  Occasional wrong word or "sound-alike" substitutions may have occurred due to the inherent limitations of voice recognition software  Interpretation should be guided by context  SYEDA Jesus  1/31/2023  9:25 PM

## 2023-02-01 NOTE — ASSESSMENT & PLAN NOTE
Patient alert and oriented x3 on exam today  Continue supportive care  Reorient and redirect as needed  Continue practicing sleep hygiene  Encourage assistance with ambulation and ADLs  Encourage participation in group activities

## 2023-03-30 ENCOUNTER — NURSING HOME VISIT (OUTPATIENT)
Dept: GERIATRICS | Facility: OTHER | Age: 73
End: 2023-03-30

## 2023-03-30 DIAGNOSIS — R60.0 BILATERAL LOWER EXTREMITY EDEMA: Primary | Chronic | ICD-10-CM

## 2023-03-30 DIAGNOSIS — R06.2 EXPIRATORY WHEEZING: ICD-10-CM

## 2023-03-30 DIAGNOSIS — Z86.73 HISTORY OF CVA (CEREBROVASCULAR ACCIDENT): ICD-10-CM

## 2023-03-30 DIAGNOSIS — N40.0 BENIGN PROSTATIC HYPERPLASIA, UNSPECIFIED WHETHER LOWER URINARY TRACT SYMPTOMS PRESENT: ICD-10-CM

## 2023-03-30 DIAGNOSIS — I21.A1 TYPE 2 MYOCARDIAL INFARCTION WITHOUT ST ELEVATION (HCC): ICD-10-CM

## 2023-03-30 DIAGNOSIS — G31.84 MILD COGNITIVE IMPAIRMENT: Chronic | ICD-10-CM

## 2023-03-30 DIAGNOSIS — K21.00 GASTROESOPHAGEAL REFLUX DISEASE WITH ESOPHAGITIS WITHOUT HEMORRHAGE: ICD-10-CM

## 2023-03-30 DIAGNOSIS — D50.9 IRON DEFICIENCY ANEMIA, UNSPECIFIED IRON DEFICIENCY ANEMIA TYPE: ICD-10-CM

## 2023-03-30 NOTE — PROGRESS NOTES
[facility]  [address]  [phone]  [address] [facility] [address] [facility]      NAME: Cristina Chatman  AGE: 67 y o  SEX: male [phone]    DATE OF ENCOUNTER: 3/30/2023    Assessment and Plan     Diagnoses and all orders for this visit:    Bilateral lower extremity edema    Gastroesophageal reflux disease with esophagitis without hemorrhage    Non-ST elevation myocardial infarction (NSTEMI), type 2    Benign prostatic hyperplasia, unspecified whether lower urinary tract symptoms present    Mild cognitive impairment    History of CVA (cerebrovascular accident)    Expiratory wheezing    Iron deficiency anemia, unspecified iron deficiency anemia type        1  History of CAD  - Currently looking stable without any chest pain or shortness of breath  - Continue aspirin  -We will decrease atorvastatin to 40 mg daily    2  Hypertension  - Blood pressure to be stable  - Continue amlodipine 5 mg p o  daily  - Continue to monitor vital signs regularly as per protocol    3  History of CVA  - Stable  Discontinue aspirin, statin with supportive care    4  GERD  - Currently denies any dysphagia, reflux, regurgitation  - Continue Protonix 40 mg p o  daily along with sucralfate 1 g with meals    5  Iron deficiency anemia  - Hemoglobin stable at around 7 5-8 5  - Continue vitamin supplementation    6  BPH  - Denies any urinary complaints  - We will continue tamsulosin 0 4 mg daily    7  Depression  - Appears to be stable with pleasant mood  - Continue Lexapro 5 mg daily  - Will discontinue patient put on    8  Mild cognitive impairment  - We will continue to provide supportive care, redirect as needed, adequate sleep hygiene practices, assistance with ambulation and ADLs    Chief Complaint     Routine Long term follow-up visit  History of Present Illness     HPI     Patient was seen and examined by me  Denies any other complaints  No chest pain, shortness of breath    Reflux has been under control  No active bleeding  Denies any chest pain, shortness of breath  Leg swelling is still persistent  The following portions of the patient's history were reviewed and updated as appropriate: allergies, current medications, past family history, past medical history, past social history, past surgical history and problem list     Review of Systems     Review of Systems   Constitutional: Negative for activity change, appetite change, chills, diaphoresis, fatigue, fever and unexpected weight change  HENT: Negative for rhinorrhea and sore throat  Eyes: Negative for visual disturbance  Respiratory: Negative for cough, chest tightness and shortness of breath  Cardiovascular: Positive for leg swelling  Negative for chest pain and palpitations  Gastrointestinal: Negative for abdominal distention, abdominal pain, blood in stool, constipation, diarrhea, nausea and vomiting  Genitourinary: Negative for difficulty urinating  Musculoskeletal: Negative for arthralgias  Neurological: Negative for headaches  Psychiatric/Behavioral: Negative for behavioral problems and sleep disturbance  Active Problem List     Patient Active Problem List   Diagnosis   • Multiple injuries   • Ambulatory dysfunction   • Falls frequently   • Prediabetes   • Non-ST elevation myocardial infarction (NSTEMI), type 2   • HTN (hypertension)   • Mild cognitive impairment   • Morbid obesity with BMI of 45 0-49 9, adult ([facility])   • Knee pain   • Multiple wounds of skin   • Thalamic infarct, acute ([facility])   • Basal ganglia infarction ([facility] [address] )   • Pneumonia due to COVID-19 virus   • Goals of care, counseling/discussion   • Gastroesophageal reflux disease with esophagitis   • Depression   • BPH (benign prostatic hyperplasia)   • History of CVA (cerebrovascular accident)   • Anemia   • Bilateral lower extremity edema   • Expiratory wheezing       Objective     Vitals: T 97 5, P 74, RR 16, /68, O2 94%, Wts 255 lbs  Physical Exam  Vitals reviewed  Constitutional:       General: He is not in acute distress  Appearance: He is well-developed  HENT:      Head: Normocephalic and atraumatic  Eyes:      General: No scleral icterus  Right eye: No discharge  Left eye: No discharge  Cardiovascular:      Rate and Rhythm: Normal rate and regular rhythm  Pulses: Normal pulses  Heart sounds: Normal heart sounds  Pulmonary:      Effort: Pulmonary effort is normal  No respiratory distress  Breath sounds: Normal breath sounds  No wheezing or rales  Chest:      Chest wall: No tenderness  Abdominal:      General: Bowel sounds are normal  There is no distension  Palpations: Abdomen is soft  Tenderness: There is no abdominal tenderness  Musculoskeletal:         General: No swelling or tenderness  Normal range of motion  Cervical back: Normal range of motion  Right lower leg: Edema present  Left lower leg: Edema present  Skin:     General: Skin is warm  Coloration: Skin is not pale  Neurological:      General: No focal deficit present  Mental Status: He is alert  Mental status is at baseline  Cranial Nerves: No cranial nerve deficit  Sensory: No sensory deficit  Motor: No weakness  Psychiatric:         Mood and Affect: Mood normal          Behavior: Behavior normal          Pertinent Laboratory/Diagnostic Studies:  Refer to facility chart  Current Medications   Medications reviewed and updated in facility chart

## 2023-05-02 ENCOUNTER — NURSING HOME VISIT (OUTPATIENT)
Dept: GERIATRICS | Facility: OTHER | Age: 73
End: 2023-05-02

## 2023-05-02 VITALS
WEIGHT: 257.2 LBS | RESPIRATION RATE: 18 BRPM | SYSTOLIC BLOOD PRESSURE: 115 MMHG | BODY MASS INDEX: 45.56 KG/M2 | TEMPERATURE: 97.7 F | DIASTOLIC BLOOD PRESSURE: 70 MMHG | OXYGEN SATURATION: 95 % | HEART RATE: 67 BPM

## 2023-05-02 DIAGNOSIS — F32.A DEPRESSION, UNSPECIFIED DEPRESSION TYPE: ICD-10-CM

## 2023-05-02 DIAGNOSIS — I10 PRIMARY HYPERTENSION: ICD-10-CM

## 2023-05-02 DIAGNOSIS — Z86.73 HISTORY OF CVA (CEREBROVASCULAR ACCIDENT): ICD-10-CM

## 2023-05-02 DIAGNOSIS — G31.84 MILD COGNITIVE IMPAIRMENT: Primary | Chronic | ICD-10-CM

## 2023-05-02 DIAGNOSIS — K21.00 GASTROESOPHAGEAL REFLUX DISEASE WITH ESOPHAGITIS WITHOUT HEMORRHAGE: ICD-10-CM

## 2023-05-02 DIAGNOSIS — N40.0 BENIGN PROSTATIC HYPERPLASIA, UNSPECIFIED WHETHER LOWER URINARY TRACT SYMPTOMS PRESENT: ICD-10-CM

## 2023-05-02 DIAGNOSIS — R26.2 AMBULATORY DYSFUNCTION: ICD-10-CM

## 2023-05-02 NOTE — ASSESSMENT & PLAN NOTE
· Patient states he spends most of the time in his wheelchair  · Continue supportive care  · Ensure patient does safe transfers from wheelchair  · Fall precautions

## 2023-05-02 NOTE — ASSESSMENT & PLAN NOTE
· Continue amlodipine 5 mg po daily  · Continue furosemide 20 mg po daily  · Blood pressure controlled  · 04//70  · 04//72  · 04//70

## 2023-05-02 NOTE — ASSESSMENT & PLAN NOTE
· Patient alert and oriented x 3  · Continue 24/7 supportive care on LTC unit  · Encourage patient to participate in group activities  · Encourage patient to participate in ADL's

## 2023-05-02 NOTE — PROGRESS NOTES
[address]  [phone]    NAME: Becky Martin  AGE: 67 y o  SEX: male    Progress Note    Location: [facility]  POS: 28 (LTC)      Chief complaint / Reason for visit:  Follow-up visit    Assessment/Plan:    Ambulatory dysfunction  · Patient states he spends most of the time in his wheelchair  · Continue supportive care  · Ensure patient does safe transfers from wheelchair  · Fall precautions    BPH (benign prostatic hyperplasia)  · Continue tamsulosin 0 4 mg po daily    Depression  · Patient's mood stable throughout our discussion  · Participating in assessment, answered questions appropriately  · Continue to monitor  · Patient weaned off buspirone 5 mg 03/30/23  · Patient weaned off lexapro 5 mg 08/29/22    History of CVA (cerebrovascular accident)  · Continue 24/7 supportive care on LTC unit  · Continue aspirin 81 mg po daily  · Continue atorvastatin 40 mg po daily at bedtime  · Avoid hypertension  · Continue amlodipine 5 mg po daily    Gastroesophageal reflux disease with esophagitis  · Encourage OOB for meals  · Ensure patient is upright when eating  · Avoid having patient laying down after meals  · Avoid trigger foods  · Continue pantoprazole 40 mg po daily    HTN (hypertension)  · Continue amlodipine 5 mg po daily  · Continue furosemide 20 mg po daily  · Blood pressure controlled  · 04//70  · 04//72  · 04//70    Mild cognitive impairment  · Patient alert and oriented x 3  · Continue 24/7 supportive care on LTC unit  · Encourage patient to participate in group activities  · Encourage patient to participate in ADL's      This is a 67 y o  male seen today at [facility] long term care unit  Medical history includes cerebral infarction, bipolar disorder, mild cognitive impairment, BPH, major depressive disorder, essential hypertension, and hyperlipidemia      Patient is seen today in his room on the long term care unit   Patient is alert and oriented x 3  He is engaged in our conversation  He answers questions appropriately  He is disappointed that he is not allowed to go outside  He is sitting OOB in his wheelchair watching tv  He states he doesn't have any concerns at this time  He denies any pain  He reports he has been eating and drinking well  Labs and vitals were reviewed in patient's chart  Nursing staff voices they have no concerns for patient at this time  Repeat blood work was ordered on this patient  Nursing and prior provider notes reviewed on this visit  Discussed visit with PCP and nursing staff/ supervisor  Review of Systems   Constitutional: Positive for activity change  Negative for appetite change, fatigue and fever  HENT: Negative for congestion and rhinorrhea  Eyes: Negative for pain and visual disturbance  Respiratory: Negative for cough and shortness of breath  Cardiovascular: Negative for chest pain and leg swelling  Gastrointestinal: Negative for abdominal pain and constipation  Genitourinary: Negative for difficulty urinating and dysuria  Musculoskeletal: Positive for gait problem  Negative for arthralgias  Skin: Negative for color change and rash  Neurological: Negative for dizziness, syncope and weakness  Psychiatric/Behavioral: Negative for behavioral problems and confusion  The patient is not nervous/anxious  All other systems reviewed and are negative  ALLERGY: Reviewed, unchanged  No Known Allergies    HISTORY:  Medical Hx: Reviewed, unchanged  Family Hx: Reviewed, unchanged  Soc Hx: Reviewed,  unchanged      Physical Exam  Vitals reviewed  Constitutional:       General: He is not in acute distress  Appearance: Normal appearance  He is obese  He is not ill-appearing  HENT:      Head: Normocephalic  Right Ear: Tympanic membrane normal       Left Ear: Tympanic membrane normal       Nose: Nose normal  No congestion  "Mouth/Throat:      Mouth: Mucous membranes are moist       Pharynx: Oropharynx is clear  Eyes:      General:         Right eye: No discharge  Left eye: No discharge  Extraocular Movements: Extraocular movements intact  Conjunctiva/sclera: Conjunctivae normal       Pupils: Pupils are equal, round, and reactive to light  Cardiovascular:      Rate and Rhythm: Normal rate and regular rhythm  Pulses: Normal pulses  Heart sounds: Normal heart sounds  Pulmonary:      Effort: Pulmonary effort is normal  No respiratory distress  Breath sounds: Normal breath sounds  Chest:      Chest wall: No tenderness  Abdominal:      General: Bowel sounds are normal       Palpations: Abdomen is soft  Tenderness: There is no abdominal tenderness  Musculoskeletal:         General: No swelling  Normal range of motion  Cervical back: Normal range of motion  Right lower leg: No edema  Left lower leg: No edema  Skin:     General: Skin is warm and dry  Neurological:      Mental Status: He is alert and oriented to person, place, and time  Mental status is at baseline  Motor: No weakness  Psychiatric:         Mood and Affect: Mood normal          Behavior: Behavior normal          Thought Content: Thought content normal             Laboratory results / Imaging reviewed: Hard copy/ies in medical chart:    Vitals:    05/02/23 0923   BP: 115/70   Pulse: 67   Resp: 18   Temp: 97 7 °F (36 5 °C)   SpO2: 95%       Current Medications: All medications reviewed and updated in Nursing Home Chart    Please note: This note was completed in part utilizing a voice-recognition software may have been used in the preparation of this document  Grammatical errors, random word insertion, spelling mistakes, and incomplete sentences may be an occasional consequence of the system secondary to software limitations, ambient noise and hardware issues   Occasional wrong word or \"sound-alike\" " substitutions may have occurred due to the inherent limitations of voice recognition software  At the time of dictation, efforts were made to edit, clarify and/or correct errors  Interpretation should be guided by context  Please read the chart carefully and recognize, using context, where substitutions have occurred  If you have any questions or concerns about the context, text or information contained within the body of this dictation, please contact myself, the provider, for further clarification        SYEDA Betancourt  5/2/2023

## 2023-05-02 NOTE — ASSESSMENT & PLAN NOTE
· Continue 24/7 supportive care on LTC unit  · Continue aspirin 81 mg po daily  · Continue atorvastatin 40 mg po daily at bedtime  · Avoid hypertension  · Continue amlodipine 5 mg po daily

## 2023-05-02 NOTE — ASSESSMENT & PLAN NOTE
· Encourage OOB for meals  · Ensure patient is upright when eating  · Avoid having patient laying down after meals  · Avoid trigger foods  · Continue pantoprazole 40 mg po daily

## 2023-05-02 NOTE — ASSESSMENT & PLAN NOTE
· Patient's mood stable throughout our discussion  · Participating in assessment, answered questions appropriately  · Continue to monitor  · Patient weaned off buspirone 5 mg 03/30/23  · Patient weaned off lexapro 5 mg 08/29/22

## 2023-06-07 ENCOUNTER — NURSING HOME VISIT (OUTPATIENT)
Dept: GERIATRICS | Facility: OTHER | Age: 73
End: 2023-06-07
Payer: COMMERCIAL

## 2023-06-07 DIAGNOSIS — R26.2 AMBULATORY DYSFUNCTION: Primary | ICD-10-CM

## 2023-06-07 DIAGNOSIS — K21.00 GASTROESOPHAGEAL REFLUX DISEASE WITH ESOPHAGITIS WITHOUT HEMORRHAGE: ICD-10-CM

## 2023-06-07 DIAGNOSIS — F32.A DEPRESSION, UNSPECIFIED DEPRESSION TYPE: ICD-10-CM

## 2023-06-07 DIAGNOSIS — I10 PRIMARY HYPERTENSION: ICD-10-CM

## 2023-06-07 DIAGNOSIS — N40.0 BENIGN PROSTATIC HYPERPLASIA, UNSPECIFIED WHETHER LOWER URINARY TRACT SYMPTOMS PRESENT: ICD-10-CM

## 2023-06-07 DIAGNOSIS — D50.9 IRON DEFICIENCY ANEMIA, UNSPECIFIED IRON DEFICIENCY ANEMIA TYPE: ICD-10-CM

## 2023-06-07 PROCEDURE — 99309 SBSQ NF CARE MODERATE MDM 30: CPT | Performed by: FAMILY MEDICINE

## 2023-06-07 NOTE — PROGRESS NOTES
[facility]  [address]  [phone]  [facility]  POS 32      NAME: Eri Herman  AGE: 67 y o  SEX: male [id number]    DATE OF ENCOUNTER: 6/17/2023    Assessment and Plan     1  Ambulatory dysfunction  - uses wheelchair  - Fall precautions in place    2  Primary hypertension/edema  - cont Atorvastatin 40 mg po qhs  - cont Lasix 20 mg po qd  - cont Amlodipine 5 mg po qd  - cont ASA 81 mg po qd    3  Depression, unspecified depression type  - cont Depakote sprinkles 125 mg po qhs    4  Benign prostatic hyperplasia, unspecified whether lower urinary tract symptoms present  - stable  - cont Tamsulosin 0 4 mg po qd    5  Gastroesophageal reflux disease with esophagitis without hemorrhage  - cont Sucralfate 1 gm po bid  - cont Pantoprazole 40 m gpo qd    6  Anemia, chronic, iron def:  - last Hb 7 3 (5/10/23)  - cont FeSo4 325 mg po qd    - Counseling Documentation: patient was counseled regarding: risk factor reductions    Chief Complaint     Routine Long term follow-up visit  History of Present Illness     HPI  Uriel Sidhu, a 66 y/o male is a long term resident at [facility], seen and examined, stable  He found sitting in the hallway, dozing off, easily awakable  He is able to hold conversation, have no c/o at this time  Staff have no concerns at this time  He eats well, need mod assistance with ADLs  He uses wheelchair  He has no recent falls or hospitalizations  The following portions of the patient's history were reviewed and updated as appropriate: allergies, current medications, past family history, past medical history, past social history, past surgical history and problem list     Review of Systems     Review of Systems   Constitutional: Positive for fatigue  Negative for activity change  HENT: Positive for hearing loss  Eyes: Negative  Respiratory: Negative  Cardiovascular: Negative  Gastrointestinal: Negative  Genitourinary: Negative  Musculoskeletal: Positive for gait problem  Negative for arthralgias  Neurological: Positive for weakness  Psychiatric/Behavioral: Negative  All other systems reviewed and are negative  As in HPI  Active Problem List     Patient Active Problem List   Diagnosis   • Multiple injuries   • Ambulatory dysfunction   • Falls frequently   • Prediabetes   • Non-ST elevation myocardial infarction (NSTEMI), type 2   • HTN (hypertension)   • Mild cognitive impairment   • Morbid obesity with BMI of 45 0-49 9, adult ([facility])   • Knee pain   • Multiple wounds of skin   • Thalamic infarct, acute ([facility])   • Basal ganglia infarction ([facility] [address] )   • Pneumonia due to COVID-19 virus   • Goals of care, counseling/discussion   • Gastroesophageal reflux disease with esophagitis   • Depression   • BPH (benign prostatic hyperplasia)   • History of CVA (cerebrovascular accident)   • Anemia   • Bilateral lower extremity edema   • Expiratory wheezing       Objective     Vitals: T: 97 5, P: 72, R: 16, BP: 132/71, 95% on RA, Wt: 253 lbs    Physical Exam  Vitals and nursing note reviewed  Constitutional:       General: He is not in acute distress  Appearance: He is well-developed  He is obese  He is not diaphoretic  HENT:      Head: Normocephalic and atraumatic  Nose: Nose normal       Mouth/Throat:      Mouth: Mucous membranes are moist       Pharynx: Oropharynx is clear  No oropharyngeal exudate  Eyes:      General: No scleral icterus  Right eye: No discharge  Left eye: No discharge  Extraocular Movements: Extraocular movements intact  Conjunctiva/sclera: Conjunctivae normal    Cardiovascular:      Rate and Rhythm: Normal rate and regular rhythm  Heart sounds: Normal heart sounds  No murmur heard  Pulmonary:      Effort: Pulmonary effort is normal  No respiratory distress  Breath sounds: Normal breath sounds  No wheezing  Chest:      Chest wall: No tenderness     Abdominal:      General: Bowel sounds are normal  There is no distension  Palpations: Abdomen is soft  Tenderness: There is no abdominal tenderness  There is no guarding  Musculoskeletal:         General: No tenderness or deformity  Normal range of motion  Right lower leg: No edema  Left lower leg: No edema  Skin:     General: Skin is warm and dry  Neurological:      Mental Status: He is alert and oriented to person, place, and time  Cranial Nerves: No cranial nerve deficit  Motor: No abnormal muscle tone  Coordination: Coordination normal    Psychiatric:         Mood and Affect: Mood normal          Behavior: Behavior normal          Pertinent Laboratory/Diagnostic Studies:  Refer to facility chart  Current Medications   Medications reviewed and updated in facility chart

## 2023-07-10 ENCOUNTER — NURSING HOME VISIT (OUTPATIENT)
Dept: GERIATRICS | Facility: OTHER | Age: 73
End: 2023-07-10
Payer: COMMERCIAL

## 2023-07-10 VITALS
HEART RATE: 70 BPM | RESPIRATION RATE: 18 BRPM | BODY MASS INDEX: 44.82 KG/M2 | WEIGHT: 253 LBS | TEMPERATURE: 97.5 F | DIASTOLIC BLOOD PRESSURE: 74 MMHG | OXYGEN SATURATION: 95 % | SYSTOLIC BLOOD PRESSURE: 122 MMHG

## 2023-07-10 DIAGNOSIS — R26.2 AMBULATORY DYSFUNCTION: ICD-10-CM

## 2023-07-10 DIAGNOSIS — R73.03 PREDIABETES: ICD-10-CM

## 2023-07-10 DIAGNOSIS — F32.A DEPRESSION, UNSPECIFIED DEPRESSION TYPE: ICD-10-CM

## 2023-07-10 DIAGNOSIS — R60.0 BILATERAL LOWER EXTREMITY EDEMA: Chronic | ICD-10-CM

## 2023-07-10 DIAGNOSIS — Z86.73 HISTORY OF CVA (CEREBROVASCULAR ACCIDENT): ICD-10-CM

## 2023-07-10 DIAGNOSIS — N40.0 BENIGN PROSTATIC HYPERPLASIA, UNSPECIFIED WHETHER LOWER URINARY TRACT SYMPTOMS PRESENT: ICD-10-CM

## 2023-07-10 DIAGNOSIS — G31.84 MILD COGNITIVE IMPAIRMENT: Chronic | ICD-10-CM

## 2023-07-10 DIAGNOSIS — D50.9 IRON DEFICIENCY ANEMIA, UNSPECIFIED IRON DEFICIENCY ANEMIA TYPE: Primary | ICD-10-CM

## 2023-07-10 DIAGNOSIS — I10 PRIMARY HYPERTENSION: ICD-10-CM

## 2023-07-10 PROCEDURE — 99309 SBSQ NF CARE MODERATE MDM 30: CPT | Performed by: NURSE PRACTITIONER

## 2023-07-10 RX ORDER — ATORVASTATIN CALCIUM 40 MG/1
40 TABLET, FILM COATED ORAL
Start: 2023-07-10

## 2023-07-10 RX ORDER — FUROSEMIDE 20 MG/1
20 TABLET ORAL EVERY MORNING
COMMUNITY

## 2023-07-10 NOTE — ASSESSMENT & PLAN NOTE
· AAOx 3- clear coherant speech   · Able to make his needs known  Provide redirection, reorientation, distraction techniques  Fall Precautions  Assist with ADLs/IADLs  Avoid deliriogenic medications such as tramadol, benzodiazepines, anticholinergics, benadryl  Encourage Hydration/ Nutrition  Implement sleep hygiene, limit night time interuptions   Encourage participation in group activities when appropriate

## 2023-07-10 NOTE — ASSESSMENT & PLAN NOTE
· Mostly w/c bound, able to turn and pivot   · Fall Precautions  · Continue 24/7 support at [address] [place] [address]

## 2023-07-10 NOTE — ASSESSMENT & PLAN NOTE
· Controlled per review of BP log at [facility]  · Continue Amlodipine 5 mg daily   · Continue Furosemide 20 mg daily

## 2023-07-10 NOTE — PROGRESS NOTES
[address]  [phone]  FACILITY: [facility]  Code 32 (LTC)        NAME: Roslyn Jones  AGE: 67 y.o. SEX: male CODE STATUS: CPR    DATE OF ENCOUNTER: 7/10/2023    Assessment and Plan     1. Iron deficiency anemia, unspecified iron deficiency anemia type  Assessment & Plan:  · Hx of Iron def anemia   · Hemoglobin's at baseline ranging 7.0-8.6  · Continue Ferrous Sulfate 325 mg daily   · Patient denies any s/s of bleeding, denies any dizziness/CP/Palpitations    Orders:  -     ferrous sulfate 325 (65 Fe) mg tablet; Take 1 tablet (325 mg total) by mouth daily with breakfast    2. Prediabetes  Assessment & Plan:  · Last HgbA1c 6.3 (5/3/23)   Per geriatric guidelines, goal HgA1c is > 7.5  to prevent hypoglycemic event in the older  adult with cognitive decline    · Repeat A1c every 3 months at [facility]  · Ordered for Monday 8/7/23      3. Bilateral lower extremity edema  Assessment & Plan:  · Started on Lasix 20 mg daily for b/l lower leg edema in Feb 2023 at [facility]  · Last Echo with EF 60% (2019)  · BNP <30 in Feb  · Also on Amlodipine for HTN likely contributing   · Hx of obesity with sedentary lifestyle- likley lymphedema  · Renal fx WNL  · Continue compression/elevation  · Continue lasix       4. Depression, unspecified depression type  Assessment & Plan:  · Mood stable   · Per GDR recommendations:  Depakote Sprinkles decreased to 125 mg at bedtime in June 2023  · Doing well on this dose    Orders:  -     divalproex sodium (DEPAKOTE SPRINKLE) 125 MG capsule; Take 1 capsule (125 mg total) by mouth daily at bedtime    5. Ambulatory dysfunction  Assessment & Plan:  · Mostly w/c bound, able to turn and pivot   · Fall Precautions  · Continue 24/7 support at [address] [place] [address]       6.  Benign prostatic hyperplasia, unspecified whether lower urinary tract symptoms present  Assessment & Plan:  · Stable   · Denies any complaints on exam   · Continue Tamsulosin 0.4 mg Daily       7. History of CVA (cerebrovascular accident)  Assessment & Plan:  · Stable   · Continue LTC support at SNF  · Continue Aspirin 81 mg daily   · Continue Atorvastatin 40 mg daily    Orders:  -     atorvastatin (LIPITOR) 40 mg tablet; Take 1 tablet (40 mg total) by mouth daily with dinner    8. Primary hypertension  Assessment & Plan:  · Controlled per review of BP log at SNF  · Continue Amlodipine 5 mg daily   · Continue Furosemide 20 mg daily       9. Mild cognitive impairment  Assessment & Plan:  · AAOx 3- clear coherant speech   · Able to make his needs known  Provide redirection, reorientation, distraction techniques  Fall Precautions  Assist with ADLs/IADLs  Avoid deliriogenic medications such as tramadol, benzodiazepines, anticholinergics, benadryl  Encourage Hydration/ Nutrition  Implement sleep hygiene, limit night time interuptions   Encourage participation in group activities when appropriate            All medications and routine orders were reviewed and updated as needed. Chief Complaint     LTC follow up visit     Past Medical and Surgica History      Past Medical History:   Diagnosis Date   • Anemia    • Anxiety    • Groves's esophagus    • BPH (benign prostatic hyperplasia)    • COVID-19    • Depression    • Esophageal ulcer    • Hematemesis    • Hyperlipidemia    • Hypertension    • MCI (mild cognitive impairment)    • Muscle weakness    • TIA (transient ischemic attack)      History reviewed. No pertinent surgical history. No Known Allergies       History of Present Illness     Lori Dempsey is a 67 y.o. male, he is a LTC resident of [facility] since May 2020. Patient has a past medical Hx including but not limited to cerebral infarction, bipolar disorder, mild cognitive impairment, BPH, major depressive disorder, essential hypertension, and hyperlipidemia. Patient is seen in collaboration with nursing for medical mgmt and LTC follow up.      Seen and examined at bedside today. Patient is a reliable historian. He is AAOx3, forgetful with some short term memory loss. He is c/o toe pain, has overgrown toe nails, placed on SNF Podiatry list. He offers no other complaints on exam. He states he is eating well and staying hydrated. He deneis any bowel or bladder issues. He denies CP/SOB/N/V/D. Denies lightheadedness, dizziness, headaches, vision changes. No concerns from nursing at this time. The patient's allergies, past medical, surgical, social and family history were reviewed and unchanged. Review of Systems     Review of Systems   All other systems reviewed and are negative. Objective     Vitals:   Vitals:    07/10/23 1316   BP: 122/74   Pulse: 70   Resp: 18   Temp: 97.5 °F (36.4 °C)   SpO2: 95%         Physical Exam  Vitals and nursing note reviewed. Constitutional:       General: He is not in acute distress. Appearance: Normal appearance. He is obese. HENT:      Head: Normocephalic and atraumatic. Nose: No congestion or rhinorrhea. Mouth/Throat:      Mouth: Mucous membranes are moist.   Eyes:      General: No scleral icterus. Extraocular Movements: Extraocular movements intact. Conjunctiva/sclera: Conjunctivae normal.      Pupils: Pupils are equal, round, and reactive to light. Cardiovascular:      Rate and Rhythm: Normal rate and regular rhythm. Pulses: Normal pulses. Heart sounds: Normal heart sounds. No murmur heard. Pulmonary:      Effort: Pulmonary effort is normal.      Breath sounds: Normal breath sounds. No wheezing, rhonchi or rales. Abdominal:      General: Bowel sounds are normal. There is no distension. Palpations: Abdomen is soft. Tenderness: There is no abdominal tenderness. Musculoskeletal:         General: No swelling or signs of injury. Right lower leg: Edema present. Left lower leg: Edema present.    Lymphadenopathy:      Cervical: No cervical adenopathy. Skin:     General: Skin is warm and dry. Findings: No erythema. Neurological:      Mental Status: He is alert and oriented to person, place, and time. Sensory: No sensory deficit. Motor: Weakness present. Gait: Gait abnormal.   Psychiatric:         Mood and Affect: Mood normal.         Behavior: Behavior normal.         Pertinent Laboratory/Diagnostic Studies:     Reviewed in facility chart      Current Medications   Medications reviewed and updated see facility [facility] for details. Current Outpatient Medications:   •  atorvastatin (LIPITOR) 40 mg tablet, Take 1 tablet (40 mg total) by mouth daily with dinner, Disp: , Rfl:   •  furosemide (LASIX) 20 mg tablet, Take 20 mg by mouth every morning, Disp: , Rfl:   •  acetaminophen (TYLENOL) 325 mg tablet, Take 650 mg by mouth every 6 (six) hours as needed, Disp: , Rfl:   •  amLODIPine (NORVASC) 5 mg tablet, Take 1 tablet (5 mg total) by mouth daily, Disp: 30 tablet, Rfl: 0  •  aspirin 81 mg chewable tablet, Chew 1 tablet (81 mg total) daily, Disp: 60 tablet, Rfl: 0  •  divalproex sodium (DEPAKOTE SPRINKLE) 125 MG capsule, Take 1 capsule (125 mg total) by mouth daily at bedtime, Disp: , Rfl:   •  ferrous sulfate 325 (65 Fe) mg tablet, Take 1 tablet (325 mg total) by mouth daily with breakfast, Disp: , Rfl:   •  multivitamin-iron-minerals-folic acid (THERAPEUTIC-M) TABS tablet, Take 1 tablet by mouth daily, Disp: , Rfl:   •  pantoprazole (PROTONIX) 40 mg tablet, Take 40 mg by mouth daily, Disp: , Rfl:   •  polyethylene glycol (MIRALAX) 17 g packet, Take 17 g by mouth daily as needed, Disp: , Rfl:   •  sucralfate (CARAFATE) 1 g tablet, Take 1 g by mouth 3 (three) times a day before meals, Disp: , Rfl:   •  tamsulosin (FLOMAX) 0.4 mg, Take 0.4 mg by mouth daily with dinner, Disp: , Rfl:      Please note:  Voice-recognition software may have been used in the preparation of this document.   Occasional wrong word or "sound-alike" substitutions may have occurred due to the inherent limitations of voice recognition software. Interpretation should be guided by context.          Christin Meigs, CRNP  7/10/2023  1:17 PM

## 2023-07-10 NOTE — ASSESSMENT & PLAN NOTE
· Mood stable   · Per GDR recommendations:  Depakote Sprinkles decreased to 125 mg at bedtime in June 2023  · Doing well on this dose

## 2023-07-11 RX ORDER — FERROUS SULFATE 325(65) MG
325 TABLET ORAL
Start: 2023-07-11

## 2023-07-11 RX ORDER — DIVALPROEX SODIUM 125 MG/1
125 CAPSULE, COATED PELLETS ORAL
Start: 2023-07-11

## 2023-07-11 NOTE — ASSESSMENT & PLAN NOTE
· Last HgbA1c 6.3 (5/3/23)   Per geriatric guidelines, goal HgA1c is > 7.5  to prevent hypoglycemic event in the older  adult with cognitive decline    · Repeat A1c every 3 months at [facility]  · Ordered for Monday 8/7/23

## 2023-07-11 NOTE — ASSESSMENT & PLAN NOTE
· Started on Lasix 20 mg daily for b/l lower leg edema in Feb 2023 at [facility]  · Last Echo with EF 60% (2019)  · BNP <30 in Feb  · Also on Amlodipine for HTN likely contributing   · Hx of obesity with sedentary lifestyle- likley lymphedema  · Renal fx WNL  · Continue compression/elevation  · Continue lasix

## 2023-07-11 NOTE — ASSESSMENT & PLAN NOTE
· Hx of Iron def anemia   · Hemoglobin's at baseline ranging 7.0-8.6  · Continue Ferrous Sulfate 325 mg daily   · Patient denies any s/s of bleeding, denies any dizziness/CP/Palpitations

## 2023-08-29 ENCOUNTER — NURSING HOME VISIT (OUTPATIENT)
Dept: GERIATRICS | Facility: OTHER | Age: 73
End: 2023-08-29

## 2023-08-29 VITALS
WEIGHT: 258 LBS | BODY MASS INDEX: 45.7 KG/M2 | DIASTOLIC BLOOD PRESSURE: 76 MMHG | HEART RATE: 68 BPM | SYSTOLIC BLOOD PRESSURE: 127 MMHG

## 2023-08-29 DIAGNOSIS — D50.9 IRON DEFICIENCY ANEMIA, UNSPECIFIED IRON DEFICIENCY ANEMIA TYPE: ICD-10-CM

## 2023-08-29 DIAGNOSIS — R73.03 PREDIABETES: ICD-10-CM

## 2023-08-29 DIAGNOSIS — R60.0 BILATERAL LOWER EXTREMITY EDEMA: Primary | Chronic | ICD-10-CM

## 2023-08-29 DIAGNOSIS — I10 PRIMARY HYPERTENSION: ICD-10-CM

## 2023-08-29 DIAGNOSIS — Z86.73 HISTORY OF CVA (CEREBROVASCULAR ACCIDENT): ICD-10-CM

## 2023-08-29 DIAGNOSIS — F31.9 BIPOLAR 1 DISORDER (HCC): ICD-10-CM

## 2023-08-29 DIAGNOSIS — K21.00 GASTROESOPHAGEAL REFLUX DISEASE WITH ESOPHAGITIS WITHOUT HEMORRHAGE: ICD-10-CM

## 2023-08-29 NOTE — ASSESSMENT & PLAN NOTE
· Continue Protonix 40mg daily and Carafate TID  · Consider trailing off Protonix if patient is tolerating GERD symptoms well  · Encourage OOB for meals  · Follow GERD lifestyles precautions  · Avoid food triggers

## 2023-08-29 NOTE — ASSESSMENT & PLAN NOTE
· Stable, Denies any chest pain, SOB, or focal deficits  · Continue LTC support at SNF  · Continue Aspirin 81 mg daily   · Continue Atorvastatin 40 mg daily

## 2023-08-29 NOTE — ASSESSMENT & PLAN NOTE
· Started on Lasix 20 mg daily for b/l lower leg edema in Feb 2023 at [facility]. Continues to have unchanged bilateral lower leg edema. · Last Echo with EF 60% (2019). Harsh systolic murmur on clinical exam. Will repeat Echo to assess for HF  · Discontinue amlodipine due to worsening leg swelling. Blood pressure currently at goal at 127/76 with just amlodipine 5mg daily and Lasix 20mg. If blood pressure becomes uncontrolled, consider starting ACE-I at a low dose.   · Renal function is stable, GFR>60  · Continue compression/elevation  · Continue lasix 20mg

## 2023-08-29 NOTE — PROGRESS NOTES
[address]  [phone]  [facility]  POS 32      NAME: Katy Diallo  AGE: 67 y.o. SEX: male [phone]    DATE OF ENCOUNTER: 8/29/2023    Assessment and Plan     Bilateral lower extremity edema  · Started on Lasix 20 mg daily for b/l lower leg edema in Feb 2023 at [facility]. Continues to have unchanged bilateral lower leg edema. · Last Echo with EF 60% (2019). Harsh systolic murmur on clinical exam. Will repeat Echo to assess for HF  · Discontinue amlodipine due to worsening leg swelling. Blood pressure currently at goal at 127/76 with just amlodipine 5mg daily and Lasix 20mg. If blood pressure becomes uncontrolled, consider starting ACE-I at a low dose. · Renal function is stable, GFR>60  · Continue compression/elevation  · Continue lasix 20mg    HTN (hypertension)  · BP: 127/76, goal <130/80. · Discontinued Amlopdine 5mg due to leg swelling and plan to monitor blood pressures  · Consider adding ACE-I if blood pressure is not at goal  · Continue Lasix 20mg    Bipolar 1 disorder (HCC)  · Currently stable on Depakote sprinkles 125mg at bedtime  · Mood stable, current regimen is appropriate. Very high risk of recurrent symptoms, including hypersexual behaviors, if medication reduced. · Recheck Depakote levels  · F/u LFTs  ·     History of CVA (cerebrovascular accident)  · Stable, Denies any chest pain, SOB, or focal deficits  · Continue LTC support at SNF  · Continue Aspirin 81 mg daily   · Continue Atorvastatin 40 mg daily    Gastroesophageal reflux disease with esophagitis  · Continue Protonix 40mg daily and Carafate TID  · Consider trailing off Protonix if patient is tolerating GERD symptoms well  · Encourage OOB for meals  · Follow GERD lifestyles precautions  · Avoid food triggers    BPH (benign prostatic hyperplasia)  · Continue Flomax. No symptoms of LH/Dizziness.     Prediabetes  · Last HA1c 6.3 in May 2023, at goal <7.5  · F/u repeat HA1c from 08/07 Anemia  · Hx of Iron def anemia   · Hemoglobin (08/07/23) 12.1, stable  · Continue Ferrous Sulfate 325 mg daily   · Patient denies any s/s of bleeding, denies any dizziness/CP/Palpitations    Mild cognitive impairment  · AAOx 3- clear coherant speech   · Able to make his needs known  Provide redirection, reorientation, distraction techniques  Fall Precautions  Assist with ADLs/IADLs  Avoid deliriogenic medications such as tramadol, benzodiazepines, anticholinergics, benadryl  Encourage Hydration/ Nutrition  Implement sleep hygiene, limit night time interuptions   Encourage participation in group activities when appropriate         Chief Complaint     Routine Long term follow-up visit. History of Present Illness     Tin Mathew is a 68 yo male with pmhx of history of CVA, bipolar disorder, depressive disorder, HTN, HLD, mild cognitive impairment, and GERD. He has been a LTC resident of [address] [place] [address] since May 2020. Patient seen and examined at bedside. He reports no new complaints. Overall reports that his mood is stabilized. Denies feeling down or depressed. He notes that his leg swelling has been unchanged from last time he was seen. He had leg compressions but loss them and has not been wearing them. He sleeps upright in his chair. Denies any lightheadedness, dizziness, chest pain, chest palpations, headaches, SOB, abdominal pain, N/V. Reports his last BM was yesterday. He ambulates with wheelchair. The following portions of the patient's history were reviewed and updated as appropriate: allergies, current medications, past family history, past medical history, past social history, past surgical history and problem list.    Review of Systems     Review of Systems   Constitutional: Negative for chills and fever. HENT: Negative for congestion, ear pain, sinus pain and sore throat. Eyes: Negative for pain and visual disturbance.    Respiratory: Negative for cough and shortness of breath. Cardiovascular: Positive for leg swelling. Negative for chest pain and palpitations. Gastrointestinal: Negative for abdominal pain, blood in stool, constipation, diarrhea, nausea and vomiting. Genitourinary: Negative for dysuria and hematuria. Neurological: Negative for dizziness, light-headedness and headaches. Psychiatric/Behavioral: Negative for dysphoric mood. All other systems reviewed and are negative. Active Problem List     Patient Active Problem List   Diagnosis   • Multiple injuries   • Ambulatory dysfunction   • Falls frequently   • Prediabetes   • Non-ST elevation myocardial infarction (NSTEMI), type 2   • HTN (hypertension)   • Mild cognitive impairment   • Morbid obesity with BMI of 45.0-49.9, adult ([facility])   • Knee pain   • Multiple wounds of skin   • Thalamic infarct, acute ([facility])   • Basal ganglia infarction ([address])   • Pneumonia due to COVID-19 virus   • Goals of care, counseling/discussion   • Gastroesophageal reflux disease with esophagitis   • Depression   • BPH (benign prostatic hyperplasia)   • History of CVA (cerebrovascular accident)   • Anemia   • Bilateral lower extremity edema   • Expiratory wheezing   • Bipolar 1 disorder ([address])       Objective     Vitals:    Physical Exam  Vitals and nursing note reviewed. Constitutional:       Appearance: Normal appearance. He is obese. Comments: Body mass index is 45.7 kg/m²   HENT:      Head: Normocephalic. Nose: Nose normal. No congestion or rhinorrhea. Mouth/Throat:      Mouth: Mucous membranes are moist.      Pharynx: Oropharynx is clear. No oropharyngeal exudate or posterior oropharyngeal erythema. Eyes:      General: No scleral icterus. Right eye: No discharge. Left eye: No discharge. Conjunctiva/sclera: Conjunctivae normal.   Cardiovascular:      Rate and Rhythm: Normal rate and regular rhythm. Pulses: Normal pulses. Heart sounds: Murmur (harsh systolic) heard.       No gallop. Comments: No JVD  Pulmonary:      Effort: Pulmonary effort is normal. No respiratory distress. Breath sounds: Normal breath sounds. No wheezing, rhonchi or rales. Abdominal:      General: Abdomen is flat. Bowel sounds are normal. There is no distension. Palpations: Abdomen is soft. There is no mass. Tenderness: There is no abdominal tenderness. There is no guarding or rebound. Musculoskeletal:      Right lower leg: Edema present. Left lower leg: Edema present. Comments: 2+ pitting edema b/l   Skin:     General: Skin is warm and dry. Neurological:      General: No focal deficit present. Mental Status: He is alert. Psychiatric:         Mood and Affect: Mood normal.         Behavior: Behavior normal.         Pertinent Laboratory/Diagnostic Studies:  Refer to facility chart. Current Medications   Medications reviewed and updated in facility chart.

## 2023-08-29 NOTE — ASSESSMENT & PLAN NOTE
· BP: 127/76, goal <130/80.   · Discontinued Amlopdine 5mg due to leg swelling and plan to monitor blood pressures  · Consider adding ACE-I if blood pressure is not at goal  · Continue Lasix 20mg

## 2023-08-29 NOTE — ASSESSMENT & PLAN NOTE
· Hx of Iron def anemia   · Hemoglobin (08/07/23) 12.1, stable  · Continue Ferrous Sulfate 325 mg daily   · Patient denies any s/s of bleeding, denies any dizziness/CP/Palpitations

## 2023-08-29 NOTE — ASSESSMENT & PLAN NOTE
· Currently stable on Depakote sprinkles 125mg at bedtime  · Mood stable, current regimen is appropriate. Very high risk of recurrent symptoms, including hypersexual behaviors, if medication reduced.    · Recheck Depakote levels  · F/u LFTs  ·

## 2023-10-20 ENCOUNTER — NURSING HOME VISIT (OUTPATIENT)
Dept: GERIATRICS | Facility: OTHER | Age: 73
End: 2023-10-20

## 2023-10-20 VITALS
SYSTOLIC BLOOD PRESSURE: 125 MMHG | BODY MASS INDEX: 45.79 KG/M2 | OXYGEN SATURATION: 93 % | RESPIRATION RATE: 18 BRPM | TEMPERATURE: 97.5 F | WEIGHT: 258.5 LBS | DIASTOLIC BLOOD PRESSURE: 88 MMHG | HEART RATE: 83 BPM

## 2023-10-20 DIAGNOSIS — R60.0 BILATERAL LOWER EXTREMITY EDEMA: Chronic | ICD-10-CM

## 2023-10-20 DIAGNOSIS — R26.2 AMBULATORY DYSFUNCTION: ICD-10-CM

## 2023-10-20 DIAGNOSIS — K21.00 GASTROESOPHAGEAL REFLUX DISEASE WITH ESOPHAGITIS WITHOUT HEMORRHAGE: ICD-10-CM

## 2023-10-20 DIAGNOSIS — Z86.73 HISTORY OF CVA (CEREBROVASCULAR ACCIDENT): ICD-10-CM

## 2023-10-20 DIAGNOSIS — N40.0 BENIGN PROSTATIC HYPERPLASIA, UNSPECIFIED WHETHER LOWER URINARY TRACT SYMPTOMS PRESENT: ICD-10-CM

## 2023-10-20 DIAGNOSIS — I10 PRIMARY HYPERTENSION: Primary | ICD-10-CM

## 2023-10-20 NOTE — PROGRESS NOTES
[address]  [phone]  [facility]  Code   32 (LTC)        NAME: Kimberly Oakes  AGE: 67 y.o. SEX: male CODE STATUS: CPR    DATE OF ENCOUNTER: 10/20/2023    Assessment and Plan     1. Primary hypertension  Assessment & Plan:  BP stable, 125/88  Continue to monitor  Continue lasix 20 mg daily      2. Gastroesophageal reflux disease with esophagitis without hemorrhage  Assessment & Plan:  Stable  Continue Protonix 40 mg daily  Continue sucralfate BID      3. Benign prostatic hyperplasia, unspecified whether lower urinary tract symptoms present  Assessment & Plan:  Continue Flomax 0.4 mg daily      4. Bilateral lower extremity edema  Assessment & Plan:  Stable b/l LE edema  Amlodipine had been d/c'd  Continue lasix 20 mg daily  Encourage low sodium diet  Continue compression stocking and elevation      5. Ambulatory dysfunction  Assessment & Plan:  Multifactorial in the setting of chronic medical conditions  Ambulates with wheelchair  Continue PT/OT as needed  Fall Precautions  Ensure adequate nutrition/hydration   Assist with ADL's/IADL's as needed        6. History of CVA (cerebrovascular accident)  Assessment & Plan:  Stable  Continue aspirin 81 mg daily  continue atorvastatin 40 mg daily           All medications and routine orders were reviewed and updated as needed. Chief Complaint     LTC follow up visit  Patient's care was coordinated with nursing facility staff. Recent vitals, labs, and updated medications were review on [facility] system in facility. Past Medical and Surgical History      Past Medical History:   Diagnosis Date    Anemia     Anxiety     Groves's esophagus     BPH (benign prostatic hyperplasia)     COVID-19     Depression     Esophageal ulcer     Hematemesis     Hyperlipidemia     Hypertension     MCI (mild cognitive impairment)     Muscle weakness     TIA (transient ischemic attack)      History reviewed.  No pertinent surgical history. No Known Allergies       History of Present Illness     HPI  Ben Mendoza is a 67year old male, he is a LTC resident of [address] [place] [address] since 5/1/2020. Past Medical Hx including but not limited to GERD, HTN, CVA, BPH, depression, NSTEMI. He was seen in collaboration with nursing for medical mgmt and LTC follow up. Daniel Horne was seen and examined at bedside today. Patient is a reliable historian and is awake and alert. On exam patient is in his wheelchair and does not appear to be in distress. He denies pain, difficulty sleeping and has a good appetite. Patient does well ambulating with wheelchair. He denies CP/SOB/N/V/D. Denies lightheadedness, dizziness, headaches, vision changes. Patient states they are eating well and staying hydrated. Patient is incontinent of bowel and bladder. Per review of SNF records, Patient is eating 3 meals per day, consuming  %. Last documented BM 10/19/23. No concerns from nursing at this time. The patient's allergies, past medical, surgical, social and family history were reviewed and unchanged. Review of Systems     Review of Systems   Constitutional:  Negative for activity change, appetite change and fever. HENT: Negative. Negative for congestion. Eyes: Negative. Respiratory: Negative. Negative for cough, shortness of breath and wheezing. Cardiovascular:  Positive for leg swelling. Negative for chest pain and palpitations. Gastrointestinal: Negative. Negative for abdominal distention, abdominal pain, constipation, diarrhea, nausea and vomiting. Endocrine: Negative. Genitourinary: Negative. Negative for difficulty urinating and dysuria. Skin: Negative. Allergic/Immunologic: Negative. Neurological:  Positive for weakness. Negative for dizziness and headaches. Hematological: Negative. Psychiatric/Behavioral:  Negative for sleep disturbance.           Objective     Vitals:   Vitals: 10/20/23 0957   BP: 125/88   Pulse: 83   Resp: 18   Temp: 97.5 °F (36.4 °C)   SpO2: 93%         Physical Exam  Vitals and nursing note reviewed. Constitutional:       General: He is not in acute distress. Appearance: Normal appearance. He is not ill-appearing. HENT:      Head: Normocephalic and atraumatic. Nose: No congestion. Eyes:      Conjunctiva/sclera: Conjunctivae normal.   Cardiovascular:      Rate and Rhythm: Normal rate and regular rhythm. Heart sounds: Normal heart sounds. Pulmonary:      Effort: Pulmonary effort is normal. No respiratory distress. Breath sounds: Normal breath sounds. No wheezing. Abdominal:      General: Bowel sounds are normal. There is no distension. Palpations: Abdomen is soft. Tenderness: There is no abdominal tenderness. Musculoskeletal:      Right lower leg: Edema present. Left lower leg: Edema present. Skin:     General: Skin is warm. Neurological:      Mental Status: Mental status is at baseline. Motor: Weakness present. Gait: Gait abnormal.   Psychiatric:         Mood and Affect: Mood normal.         Behavior: Behavior normal.         Pertinent Laboratory/Diagnostic Studies:   Reviewed in facility chart-stable      Current Medications   Medications reviewed and updated see facility [facility] for details.       Current Outpatient Medications:     acetaminophen (TYLENOL) 325 mg tablet, Take 650 mg by mouth every 6 (six) hours as needed, Disp: , Rfl:     aspirin 81 mg chewable tablet, Chew 1 tablet (81 mg total) daily, Disp: 60 tablet, Rfl: 0    atorvastatin (LIPITOR) 40 mg tablet, Take 1 tablet (40 mg total) by mouth daily with dinner, Disp: , Rfl:     Cholecalciferol 25 MCG (1000 UT) tablet, Take 2,000 Units by mouth daily, Disp: , Rfl:     divalproex sodium (DEPAKOTE SPRINKLE) 125 MG capsule, Take 1 capsule (125 mg total) by mouth daily at bedtime, Disp: , Rfl:     ferrous sulfate 325 (65 Fe) mg tablet, Take 1 tablet (325 mg total) by mouth daily with breakfast, Disp: , Rfl:     furosemide (LASIX) 20 mg tablet, Take 20 mg by mouth every morning, Disp: , Rfl:     multivitamin-iron-minerals-folic acid (THERAPEUTIC-M) TABS tablet, Take 1 tablet by mouth daily, Disp: , Rfl:     pantoprazole (PROTONIX) 40 mg tablet, Take 40 mg by mouth daily, Disp: , Rfl:     polyethylene glycol (MIRALAX) 17 g packet, Take 17 g by mouth daily as needed, Disp: , Rfl:     sucralfate (CARAFATE) 1 g tablet, Take 1 g by mouth 2 (two) times a day with meals, Disp: , Rfl:     tamsulosin (FLOMAX) 0.4 mg, Take 0.4 mg by mouth daily with dinner, Disp: , Rfl:      Please note:  Voice-recognition software may have been used in the preparation of this document. Occasional wrong word or "sound-alike" substitutions may have occurred due to the inherent limitations of voice recognition software. Interpretation should be guided by context.          [address] [place] [address]  10/20/2023  5:06 PM

## 2023-10-20 NOTE — ASSESSMENT & PLAN NOTE
Stable b/l LE edema  Amlodipine had been d/c'd  Continue lasix 20 mg daily  Encourage low sodium diet  Continue compression stocking and elevation

## 2023-10-20 NOTE — ASSESSMENT & PLAN NOTE
Multifactorial in the setting of chronic medical conditions  Ambulates with wheelchair  Continue PT/OT as needed  Fall Precautions  Ensure adequate nutrition/hydration   Assist with ADL's/IADL's as needed

## 2023-12-04 NOTE — ASSESSMENT & PLAN NOTE
· Stable   · Continue LTC support at   · Continue Aspirin 81 mg daily   · Continue Atorvastatin 40 mg daily MEDICATIONS  (PRN):  acetaminophen     Tablet .. 650 milliGRAM(s) Oral every 4 hours PRN Mild Pain (1 - 3), Moderate Pain (4 - 6)  OLANZapine Disintegrating Tablet 5 milliGRAM(s) Oral every 4 hours PRN agitation  OLANZapine Injectable 5 milliGRAM(s) IntraMuscular once PRN Agitation

## 2023-12-05 ENCOUNTER — NURSING HOME VISIT (OUTPATIENT)
Dept: GERIATRICS | Facility: OTHER | Age: 73
End: 2023-12-05
Payer: MEDICARE

## 2023-12-05 DIAGNOSIS — I10 PRIMARY HYPERTENSION: Primary | ICD-10-CM

## 2023-12-05 DIAGNOSIS — R73.03 PREDIABETES: ICD-10-CM

## 2023-12-05 DIAGNOSIS — R60.0 BILATERAL LOWER EXTREMITY EDEMA: ICD-10-CM

## 2023-12-05 DIAGNOSIS — F31.9 BIPOLAR 1 DISORDER (HCC): ICD-10-CM

## 2023-12-05 PROCEDURE — 99309 SBSQ NF CARE MODERATE MDM 30: CPT | Performed by: FAMILY MEDICINE

## 2023-12-05 NOTE — PROGRESS NOTES
[address]  [phone]  [facility]      NAME: Rebel Benavidez  AGE: 67 y.o. SEX: male [phone]    DATE OF ENCOUNTER: 12/5/2023    Assessment and Plan     1. Primary hypertension  Stable. Continue with the same. 2. Bilateral lower extremity edema  Stable. Improved compared to last time I saw him. Continue with low dose diuretic. 3. Prediabetes  Keep lower carbohydrate diet. Monitor. Last A1c at 6.2    4. Bipolar 1 disorder (HCC)  Stable mood. Ocntinue with the same regimen          Chief Complaint     Routine Long term follow-up visit. History of Present Illness     Evelina Perez is seen for ltc follow up at [facility]. Known bpd type 1, chronic leg edema,         The following portions of the patient's history were reviewed and updated as appropriate: allergies, current medications, past family history, past medical history, past social history, past surgical history and problem list.    Review of Systems     Review of Systems   Constitutional: Negative. Negative for activity change, appetite change, chills and diaphoresis. HENT:  Negative for congestion and dental problem. Respiratory: Negative. Negative for apnea, chest tightness, shortness of breath and wheezing. Cardiovascular: Negative. Negative for chest pain, palpitations and leg swelling. Gastrointestinal: Negative. Negative for abdominal distention, abdominal pain, constipation, diarrhea and nausea. Genitourinary: Negative. Negative for difficulty urinating, dysuria and frequency.        Active Problem List     Patient Active Problem List   Diagnosis   • Multiple injuries   • Ambulatory dysfunction   • Falls frequently   • Prediabetes   • Non-ST elevation myocardial infarction (NSTEMI), type 2   • HTN (hypertension)   • Mild cognitive impairment   • Morbid obesity with BMI of 45.0-49.9, adult ([address])   • Knee pain   • Multiple wounds of skin   • Thalamic infarct, acute ([address])   • Basal ganglia infarction ([address])   • Pneumonia due to COVID-19 virus   • Goals of care, counseling/discussion   • Gastroesophageal reflux disease with esophagitis   • Depression   • BPH (benign prostatic hyperplasia)   • History of CVA (cerebrovascular accident)   • Anemia   • Bilateral lower extremity edema   • Expiratory wheezing   • Bipolar 1 disorder (HCC)       Objective     Vitals:  Vital on pcc reiewed. BP stable. Weights stable. Physical Exam  Constitutional:       General: He is not in acute distress. Appearance: Normal appearance. He is well-developed. HENT:      Head: Normocephalic and atraumatic. Right Ear: External ear normal.      Left Ear: External ear normal.      Nose: Nose normal.      Mouth/Throat:      Mouth: Mucous membranes are moist.   Eyes:      Conjunctiva/sclera: Conjunctivae normal.      Pupils: Pupils are equal, round, and reactive to light. Cardiovascular:      Rate and Rhythm: Normal rate and regular rhythm. Heart sounds: Normal heart sounds. No murmur heard. No friction rub. No gallop. Pulmonary:      Effort: Pulmonary effort is normal. No respiratory distress. Breath sounds: Normal breath sounds. No wheezing or rales. Chest:      Chest wall: No tenderness. Abdominal:      General: Bowel sounds are normal. There is no distension. Palpations: Abdomen is soft. There is no mass. Tenderness: There is no abdominal tenderness. There is no guarding or rebound. Musculoskeletal:         General: Normal range of motion. Cervical back: Normal range of motion and neck supple. Right lower leg: Edema present. Left lower leg: Edema present. Skin:     General: Skin is warm. Neurological:      Mental Status: He is alert and oriented to person, place, and time. Psychiatric:         Mood and Affect: Mood normal.         Behavior: Behavior normal.         Thought Content:  Thought content normal. Judgment: Judgment normal.         Pertinent Laboratory/Diagnostic Studies:  Refer to facility chart. Current Medications   Medications reviewed and updated in facility chart.

## 2024-01-26 ENCOUNTER — NURSING HOME VISIT (OUTPATIENT)
Dept: GERIATRICS | Facility: OTHER | Age: 74
End: 2024-01-26
Payer: MEDICARE

## 2024-01-26 VITALS
SYSTOLIC BLOOD PRESSURE: 131 MMHG | DIASTOLIC BLOOD PRESSURE: 68 MMHG | OXYGEN SATURATION: 95 % | WEIGHT: 258 LBS | HEART RATE: 74 BPM | BODY MASS INDEX: 45.7 KG/M2 | TEMPERATURE: 97.5 F | RESPIRATION RATE: 18 BRPM

## 2024-01-26 DIAGNOSIS — R26.2 AMBULATORY DYSFUNCTION: ICD-10-CM

## 2024-01-26 DIAGNOSIS — G31.84 MILD COGNITIVE IMPAIRMENT: Chronic | ICD-10-CM

## 2024-01-26 DIAGNOSIS — I10 PRIMARY HYPERTENSION: Primary | ICD-10-CM

## 2024-01-26 DIAGNOSIS — K21.00 GASTROESOPHAGEAL REFLUX DISEASE WITH ESOPHAGITIS WITHOUT HEMORRHAGE: ICD-10-CM

## 2024-01-26 DIAGNOSIS — R60.0 BILATERAL LOWER EXTREMITY EDEMA: Chronic | ICD-10-CM

## 2024-01-26 DIAGNOSIS — N40.0 BENIGN PROSTATIC HYPERPLASIA, UNSPECIFIED WHETHER LOWER URINARY TRACT SYMPTOMS PRESENT: ICD-10-CM

## 2024-01-26 PROCEDURE — 99309 SBSQ NF CARE MODERATE MDM 30: CPT

## 2024-01-26 NOTE — ASSESSMENT & PLAN NOTE
Stable b/l LE edema  Continue lasix 20 mg daily  Encourage low sodium diet  Continue compression stocking and elevation

## 2024-01-26 NOTE — ASSESSMENT & PLAN NOTE
AAO x 3, forgetful at times  Continue Depakote 125 mg at HS  Provide redirection, reorientation, distraction techniques  Fall Precautions  Assist with ADLs/IADLs  Avoid deliriogenic medications such as tramadol, benzodiazepines, anticholinergics, benadryl  Encourage Hydration/ Nutrition  Implement sleep hygiene, limit night time interuptions   Encourage participation in group activities when appropriate

## 2024-01-26 NOTE — PROGRESS NOTES
[facility]  [address] [address]  [phone]  [place] [facility]  Code   32 (LTC)        NAME: Gaston Rucker  AGE: 73 y.o. SEX: male CODE STATUS: CPR    DATE OF ENCOUNTER: 1/26/2024    Assessment and Plan     1. Primary hypertension  Assessment & Plan:  BP stable, 131/68  Continue monthly BP/HR  Continue lasix 20 mg daily      2. Gastroesophageal reflux disease with esophagitis without hemorrhage  Assessment & Plan:  Stable  Continue sucralfate BID      3. Benign prostatic hyperplasia, unspecified whether lower urinary tract symptoms present  Assessment & Plan:  Continue Flomax 0.4 mg daily      4. Mild cognitive impairment  Assessment & Plan:  AAO x 3, forgetful at times  Continue Depakote 125 mg at HS  Provide redirection, reorientation, distraction techniques  Fall Precautions  Assist with ADLs/IADLs  Avoid deliriogenic medications such as tramadol, benzodiazepines, anticholinergics, benadryl  Encourage Hydration/ Nutrition  Implement sleep hygiene, limit night time interuptions   Encourage participation in group activities when appropriate         5. Bilateral lower extremity edema  Assessment & Plan:  Stable b/l LE edema  Continue lasix 20 mg daily  Encourage low sodium diet  Continue compression stocking and elevation      6. Ambulatory dysfunction  Assessment & Plan:  Multifactorial in the setting of chronic medical conditions  Ambulates with wheelchair  Continue PT/OT as needed  Fall Precautions  Ensure adequate nutrition/hydration   Assist with ADL's/IADL's as needed               All medications and routine orders were reviewed and updated as needed.    Chief Complaint     LT follow up visit  Patient's care was coordinated with nursing facility staff. Recent vitals, labs, and updated medications were review on [facility] system in facility.  Past Medical and Surgical History      Past Medical History:   Diagnosis Date    Anemia     Anxiety     Groves's  esophagus     BPH (benign prostatic hyperplasia)     COVID-19     Depression     Esophageal ulcer     Hematemesis     Hyperlipidemia     Hypertension     MCI (mild cognitive impairment)     Muscle weakness     TIA (transient ischemic attack)      History reviewed. No pertinent surgical history.  No Known Allergies       History of Present Illness     HPI  Gaston Rucker is a 73 year old male, he is a LTC resident of [facility] since 5/1/2020. Past Medical Hx including but not limited to GERD, HTN, CVA, BPH, depression, NSTEMI. He was seen in collaboration with nursing for medical mgmt and LTC follow up.     Gaston was seen and examined at bedside today. He is awake and alert, forgetful at times. On exam patient is in his wheelchair and does not appear to be in distress.  He denies pain, difficulty sleeping and has a good appetite. Patient does well ambulating with wheelchair. Requires assistance with ADL's/IADL's. Mild B/L lower extremity edema appears stable.  He denies CP/SOB/N/V/D. Denies lightheadedness, dizziness, headaches, vision changes. Patient states they are eating well and staying hydrated. Patient is incontinent of bowel and bladder. Per review of SNF records, Patient is eating 3 meals per day, consuming  %. Last documented BM 1/26/24. No concerns from nursing at this time.    The patient's allergies, past medical, surgical, social and family history were reviewed and unchanged.    Review of Systems     Review of Systems   Constitutional:  Negative for activity change, appetite change and fever.   HENT: Negative.  Negative for congestion.    Eyes: Negative.    Respiratory: Negative.  Negative for cough, shortness of breath and wheezing.    Cardiovascular:  Positive for leg swelling. Negative for chest pain and palpitations.   Gastrointestinal: Negative.  Negative for abdominal distention, abdominal pain, constipation, diarrhea, nausea and vomiting.   Endocrine: Negative.     Genitourinary: Negative.  Negative for difficulty urinating and dysuria.   Skin: Negative.    Allergic/Immunologic: Negative.    Neurological:  Positive for weakness. Negative for dizziness and headaches.   Hematological: Negative.    Psychiatric/Behavioral:  Negative for sleep disturbance.          Objective     Vitals:   Vitals:    01/26/24 1749   BP: 131/68   Pulse: 74   Resp: 18   Temp: 97.5 °F (36.4 °C)   SpO2: 95%           Physical Exam  Vitals and nursing note reviewed.   Constitutional:       General: He is not in acute distress.     Appearance: Normal appearance. He is not ill-appearing.   HENT:      Head: Normocephalic and atraumatic.      Nose: No congestion.   Eyes:      Conjunctiva/sclera: Conjunctivae normal.   Cardiovascular:      Rate and Rhythm: Normal rate and regular rhythm.      Heart sounds: Normal heart sounds.   Pulmonary:      Effort: Pulmonary effort is normal. No respiratory distress.      Breath sounds: Normal breath sounds. No wheezing.   Abdominal:      General: Bowel sounds are normal. There is no distension.      Palpations: Abdomen is soft.      Tenderness: There is no abdominal tenderness.   Musculoskeletal:      Right lower leg: Edema present.      Left lower leg: Edema present.   Skin:     General: Skin is warm.   Neurological:      Mental Status: Mental status is at baseline.      Motor: Weakness present.      Gait: Gait abnormal.   Psychiatric:         Mood and Affect: Mood normal.         Behavior: Behavior normal.         Pertinent Laboratory/Diagnostic Studies:   Reviewed in facility chart-stable      Current Medications   Medications reviewed and updated see facility MAR for details.      Current Outpatient Medications:     acetaminophen (TYLENOL) 325 mg tablet, Take 650 mg by mouth every 6 (six) hours as needed, Disp: , Rfl:     aspirin 81 mg chewable tablet, Chew 1 tablet (81 mg total) daily, Disp: 60 tablet, Rfl: 0    atorvastatin (LIPITOR) 40 mg tablet, Take 1 tablet  "(40 mg total) by mouth daily with dinner, Disp: , Rfl:     Cholecalciferol 25 MCG (1000 UT) tablet, Take 2,000 Units by mouth daily, Disp: , Rfl:     divalproex sodium (DEPAKOTE SPRINKLE) 125 MG capsule, Take 1 capsule (125 mg total) by mouth daily at bedtime, Disp: , Rfl:     furosemide (LASIX) 20 mg tablet, Take 20 mg by mouth every morning, Disp: , Rfl:     multivitamin-iron-minerals-folic acid (THERAPEUTIC-M) TABS tablet, Take 1 tablet by mouth daily, Disp: , Rfl:     polyethylene glycol (MIRALAX) 17 g packet, Take 17 g by mouth daily as needed, Disp: , Rfl:     sucralfate (CARAFATE) 1 g tablet, Take 1 g by mouth 2 (two) times a day with meals, Disp: , Rfl:     tamsulosin (FLOMAX) 0.4 mg, Take 0.4 mg by mouth daily with dinner, Disp: , Rfl:      Please note:  Voice-recognition software may have been used in the preparation of this document.  Occasional wrong word or \"sound-alike\" substitutions may have occurred due to the inherent limitations of voice recognition software.  Interpretation should be guided by context.         SYEDA Mart  1/26/2024  6:03 PM    " No masses; no nipple discharge

## 2024-02-21 PROBLEM — J12.82 PNEUMONIA DUE TO COVID-19 VIRUS: Status: RESOLVED | Noted: 2020-04-15 | Resolved: 2024-02-21

## 2024-02-21 PROBLEM — U07.1 PNEUMONIA DUE TO COVID-19 VIRUS: Status: RESOLVED | Noted: 2020-04-15 | Resolved: 2024-02-21

## 2024-03-19 ENCOUNTER — NURSING HOME VISIT (OUTPATIENT)
Dept: GERIATRICS | Facility: OTHER | Age: 74
End: 2024-03-19
Payer: MEDICARE

## 2024-03-19 DIAGNOSIS — Z86.73 HISTORY OF CVA (CEREBROVASCULAR ACCIDENT): ICD-10-CM

## 2024-03-19 DIAGNOSIS — F31.9 BIPOLAR 1 DISORDER (HCC): Primary | ICD-10-CM

## 2024-03-19 DIAGNOSIS — I10 PRIMARY HYPERTENSION: ICD-10-CM

## 2024-03-19 DIAGNOSIS — G31.84 MILD COGNITIVE IMPAIRMENT: Chronic | ICD-10-CM

## 2024-03-19 DIAGNOSIS — I63.81 BASAL GANGLIA INFARCTION (HCC): ICD-10-CM

## 2024-03-19 PROCEDURE — 99309 SBSQ NF CARE MODERATE MDM 30: CPT | Performed by: FAMILY MEDICINE

## 2024-03-19 NOTE — PROGRESS NOTES
[facility]  [address] [address] [phone]  [place] [facility]      NAME: Gaston Rucker  AGE: 73 y.o. SEX: male [id number]    DATE OF ENCOUNTER: 3/19/2024    Assessment and Plan     1. Bipolar 1 disorder (HCC)  Stable. On depakote.   Check cmp and depakote level.     2. History of CVA (cerebrovascular accident)  On statin and asa    3. Mild cognitive impairment  Mci vs ealry vascular dementia.   Continue with supportive treatment.   Monitor.     4. Primary hypertension  Stable. VS reviewed.   No current medications.   5. Basal ganglia infarction (HCC)  On asa and statin.   No new deficits.       - Counseling Documentation: patient was counseled regarding: impressions and risks and benefits of treatment options  - Medication Side Effects: Adverse side effects of medications were reviewed with the patient/guardian today.    Chief Complaint     Routine Long term follow-up visit.    History of Present Illness     Gaston is seen for fu at LT. No new concerns per snf. Patient has no new issues.   Known mci, hx of cva, hx of mi type 2, htn. No issues with medications.           The following portions of the patient's history were reviewed and updated as appropriate: allergies, current medications, past family history, past medical history, past social history, past surgical history and problem list.    Review of Systems     Review of Systems   Constitutional: Negative.  Negative for activity change, appetite change, chills and diaphoresis.   HENT:  Negative for congestion and dental problem.    Respiratory: Negative.  Negative for apnea, chest tightness, shortness of breath and wheezing.    Cardiovascular: Negative.  Negative for chest pain, palpitations and leg swelling.   Gastrointestinal: Negative.  Negative for abdominal distention, abdominal pain, constipation, diarrhea and nausea.   Genitourinary: Negative.  Negative for difficulty urinating, dysuria and frequency.        Active Problem List     Patient Active Problem List   Diagnosis   • Multiple injuries   • Ambulatory dysfunction   • Falls frequently   • Prediabetes   • Non-ST elevation myocardial infarction (NSTEMI), type 2   • HTN (hypertension)   • Mild cognitive impairment   • Morbid obesity with BMI of 45.0-49.9, adult ([facility])   • Knee pain   • Multiple wounds of skin   • Thalamic infarct, acute ([facility])   • Basal ganglia infarction ([facility])   • Goals of care, counseling/discussion   • Gastroesophageal reflux disease with esophagitis   • Depression   • BPH (benign prostatic hyperplasia)   • History of CVA (cerebrovascular accident)   • Anemia   • Bilateral lower extremity edema   • Expiratory wheezing   • Bipolar 1 disorder ([facility])       Objective     Vitals:    Physical Exam  Vitals reviewed.   Constitutional:       General: He is not in acute distress.     Appearance: Normal appearance. He is well-developed.   HENT:      Head: Normocephalic and atraumatic.      Right Ear: External ear normal.      Left Ear: External ear normal.      Nose: Nose normal.      Mouth/Throat:      Mouth: Mucous membranes are moist.   Eyes:      Conjunctiva/sclera: Conjunctivae normal.      Pupils: Pupils are equal, round, and reactive to light.   Cardiovascular:      Rate and Rhythm: Normal rate and regular rhythm.      Heart sounds: Normal heart sounds. No murmur heard.     No friction rub. No gallop.   Pulmonary:      Effort: Pulmonary effort is normal. No respiratory distress.      Breath sounds: Normal breath sounds. No wheezing or rales.   Chest:      Chest wall: No tenderness.   Abdominal:      General: Bowel sounds are normal. There is no distension.      Palpations: Abdomen is soft. There is no mass.      Tenderness: There is no abdominal tenderness. There is no guarding or rebound.   Musculoskeletal:         General: Normal range of motion.      Cervical back: Normal range of motion and neck supple.   Skin:     General: Skin is warm.    Neurological:      Mental Status: He is alert and oriented to person, place, and time.   Psychiatric:         Mood and Affect: Mood normal.         Behavior: Behavior normal.         Thought Content: Thought content normal.         Judgment: Judgment normal.         Pertinent Laboratory/Diagnostic Studies:  Refer to facility chart.    Current Medications   Medications reviewed and updated in facility chart.

## 2024-05-20 ENCOUNTER — NURSING HOME VISIT (OUTPATIENT)
Dept: GERIATRICS | Facility: OTHER | Age: 74
End: 2024-05-20
Payer: MEDICARE

## 2024-05-20 VITALS
OXYGEN SATURATION: 96 % | RESPIRATION RATE: 18 BRPM | DIASTOLIC BLOOD PRESSURE: 74 MMHG | SYSTOLIC BLOOD PRESSURE: 120 MMHG | HEART RATE: 89 BPM | TEMPERATURE: 97.6 F | BODY MASS INDEX: 45.35 KG/M2 | WEIGHT: 256 LBS

## 2024-05-20 DIAGNOSIS — N40.0 BENIGN PROSTATIC HYPERPLASIA, UNSPECIFIED WHETHER LOWER URINARY TRACT SYMPTOMS PRESENT: ICD-10-CM

## 2024-05-20 DIAGNOSIS — R26.2 AMBULATORY DYSFUNCTION: ICD-10-CM

## 2024-05-20 DIAGNOSIS — G31.84 MILD COGNITIVE IMPAIRMENT: Chronic | ICD-10-CM

## 2024-05-20 DIAGNOSIS — I10 PRIMARY HYPERTENSION: Primary | ICD-10-CM

## 2024-05-20 DIAGNOSIS — R60.0 BILATERAL LOWER EXTREMITY EDEMA: Chronic | ICD-10-CM

## 2024-05-20 PROCEDURE — 99309 SBSQ NF CARE MODERATE MDM 30: CPT

## 2024-05-20 NOTE — PROGRESS NOTES
[facility]  [address] [address]  [phone]  [place] [facility]  Code   32 (LTC)        NAME: Gaston Rucker  AGE: 73 y.o. SEX: male CODE STATUS: CPR    DATE OF ENCOUNTER: 5/20/2024    Assessment and Plan     1. Primary hypertension  Assessment & Plan:  BP stable, 120/74  Continue monthly BP/HR  Continue lasix 20 mg daily  2. Benign prostatic hyperplasia, unspecified whether lower urinary tract symptoms present  Assessment & Plan:  Continue Flomax 0.4 mg daily  3. Ambulatory dysfunction  Assessment & Plan:  Multifactorial in the setting of chronic medical conditions  Ambulates with wheelchair  Continue PT/OT as needed  Fall Precautions  Ensure adequate nutrition/hydration   Assist with ADL's/IADL's as needed    4. Bilateral lower extremity edema  Assessment & Plan:  Stable b/l LE edema  Continue lasix 20 mg daily  Encourage low sodium diet  Continue compression stocking and elevation  5. Mild cognitive impairment  Assessment & Plan:  AAO x 3, forgetful at times  Continue Depakote 125 mg at HS  Provide redirection, reorientation, distraction techniques  Fall Precautions  Assist with ADLs/IADLs  Avoid deliriogenic medications such as tramadol, benzodiazepines, anticholinergics, benadryl  Encourage Hydration/ Nutrition  Implement sleep hygiene, limit night time interuptions   Encourage participation in group activities when appropriate            All medications and routine orders were reviewed and updated as needed.    Chief Complaint     LT follow up visit  Patient's care was coordinated with nursing facility staff. Recent vitals, labs, and updated medications were review on [facility] system in facility.  Past Medical and Surgical History      Past Medical History:   Diagnosis Date    Anemia     Anxiety     Groves's esophagus     BPH (benign prostatic hyperplasia)     COVID-19     Depression     Esophageal ulcer     Hematemesis     Hyperlipidemia     Hypertension     MCI  (mild cognitive impairment)     Muscle weakness     TIA (transient ischemic attack)      History reviewed. No pertinent surgical history.  No Known Allergies       History of Present Illness     HPI  Gaston Rucker is a 73 year old male, he is a LTC resident of [facility] since 5/1/2020. Past Medical Hx including but not limited to GERD, HTN, CVA, BPH, depression, NSTEMI. He was seen in collaboration with nursing for medical mgmt and LTC follow up.     Gaston was seen and examined at bedside today. He is awake and alert, forgetful at times. On exam patient is in his wheelchair and does not appear to be in distress.  He denies pain, has a good appetite. He reports difficulty sleeping at times d/t his neighbor. Patient does well ambulating with wheelchair. Requires assistance with ADL's/IADL's. Mild B/L lower extremity edema appears stable.  He denies CP/SOB/N/V/D. Denies lightheadedness, dizziness, headaches, vision changes. Patient states they are eating well and staying hydrated. Patient is incontinent of bowel and bladder. Per review of SNF records, Patient is eating 3 meals per day, consuming  %. Last documented BM 5/20/24. No concerns from nursing at this time.    The patient's allergies, past medical, surgical, social and family history were reviewed and unchanged.    Review of Systems     Review of Systems   Constitutional:  Negative for activity change, appetite change and fever.   HENT: Negative.  Negative for congestion.    Eyes: Negative.    Respiratory: Negative.  Negative for cough, shortness of breath and wheezing.    Cardiovascular:  Positive for leg swelling. Negative for chest pain and palpitations.   Gastrointestinal: Negative.  Negative for abdominal distention, abdominal pain, constipation, diarrhea, nausea and vomiting.   Endocrine: Negative.    Genitourinary: Negative.  Negative for difficulty urinating and dysuria.   Skin: Negative.    Allergic/Immunologic: Negative.     Neurological:  Positive for weakness. Negative for dizziness and headaches.   Hematological: Negative.    Psychiatric/Behavioral:  Negative for sleep disturbance.          Objective     Vitals:   Vitals:    05/20/24 1846   BP: 120/74   Pulse: 89   Resp: 18   Temp: 97.6 °F (36.4 °C)   SpO2: 96%           Physical Exam  Vitals and nursing note reviewed.   Constitutional:       General: He is not in acute distress.     Appearance: Normal appearance. He is not ill-appearing.   HENT:      Head: Normocephalic and atraumatic.      Nose: No congestion.   Eyes:      Conjunctiva/sclera: Conjunctivae normal.   Cardiovascular:      Rate and Rhythm: Normal rate and regular rhythm.      Heart sounds: Normal heart sounds.   Pulmonary:      Effort: Pulmonary effort is normal. No respiratory distress.      Breath sounds: Normal breath sounds. No wheezing.   Abdominal:      General: Bowel sounds are normal. There is no distension.      Palpations: Abdomen is soft.      Tenderness: There is no abdominal tenderness.   Musculoskeletal:      Right lower leg: Edema present.      Left lower leg: Edema present.   Skin:     General: Skin is warm.   Neurological:      Mental Status: Mental status is at baseline.      Motor: Weakness present.      Gait: Gait abnormal.   Psychiatric:         Mood and Affect: Mood normal.         Behavior: Behavior normal.         Pertinent Laboratory/Diagnostic Studies:   Reviewed in facility chart-stable      Current Medications   Medications reviewed and updated see facility MAR for details.      Current Outpatient Medications:     acetaminophen (TYLENOL) 325 mg tablet, Take 650 mg by mouth every 6 (six) hours as needed, Disp: , Rfl:     aspirin 81 mg chewable tablet, Chew 1 tablet (81 mg total) daily, Disp: 60 tablet, Rfl: 0    atorvastatin (LIPITOR) 40 mg tablet, Take 1 tablet (40 mg total) by mouth daily with dinner, Disp: , Rfl:     Cholecalciferol 25 MCG (1000 UT) tablet, Take 2,000 Units by mouth  "daily, Disp: , Rfl:     divalproex sodium (DEPAKOTE SPRINKLE) 125 MG capsule, Take 1 capsule (125 mg total) by mouth daily at bedtime, Disp: , Rfl:     furosemide (LASIX) 20 mg tablet, Take 20 mg by mouth every morning, Disp: , Rfl:     multivitamin-iron-minerals-folic acid (THERAPEUTIC-M) TABS tablet, Take 1 tablet by mouth daily, Disp: , Rfl:     polyethylene glycol (MIRALAX) 17 g packet, Take 17 g by mouth daily as needed, Disp: , Rfl:     sucralfate (CARAFATE) 1 g tablet, Take 1 g by mouth 2 (two) times a day with meals, Disp: , Rfl:     tamsulosin (FLOMAX) 0.4 mg, Take 0.4 mg by mouth daily with dinner, Disp: , Rfl:      Please note:  Voice-recognition software may have been used in the preparation of this document.  Occasional wrong word or \"sound-alike\" substitutions may have occurred due to the inherent limitations of voice recognition software.  Interpretation should be guided by context.         SYEDA Mart  5/20/2024  6:55 PM    "

## 2024-07-02 ENCOUNTER — NURSING HOME VISIT (OUTPATIENT)
Dept: GERIATRICS | Facility: OTHER | Age: 74
End: 2024-07-02
Payer: MEDICARE

## 2024-07-02 DIAGNOSIS — Z86.73 HISTORY OF CVA (CEREBROVASCULAR ACCIDENT): ICD-10-CM

## 2024-07-02 DIAGNOSIS — F31.9 BIPOLAR 1 DISORDER (HCC): ICD-10-CM

## 2024-07-02 DIAGNOSIS — N40.0 BENIGN PROSTATIC HYPERPLASIA, UNSPECIFIED WHETHER LOWER URINARY TRACT SYMPTOMS PRESENT: ICD-10-CM

## 2024-07-02 DIAGNOSIS — G31.84 MILD COGNITIVE IMPAIRMENT: Primary | Chronic | ICD-10-CM

## 2024-07-02 DIAGNOSIS — I25.10 CORONARY ARTERY DISEASE INVOLVING NATIVE CORONARY ARTERY OF NATIVE HEART WITHOUT ANGINA PECTORIS: ICD-10-CM

## 2024-07-02 PROBLEM — I10 HTN (HYPERTENSION): Status: RESOLVED | Noted: 2017-12-05 | Resolved: 2024-07-02

## 2024-07-02 PROCEDURE — 99309 SBSQ NF CARE MODERATE MDM 30: CPT | Performed by: FAMILY MEDICINE

## 2024-07-02 NOTE — ASSESSMENT & PLAN NOTE
No new deficits.   Stable.   Continue with asa and statin.     Higher risk for transition to vascular dementia.   Monitor.

## 2024-07-02 NOTE — PROGRESS NOTES
[facility]  [address] [address]  [phone]  [place] [facility]      NAME: Gaston Rucker  AGE: 73 y.o. SEX: male [id number]    DATE OF ENCOUNTER: 7/2/2024    Assessment and Plan     Problem List Items Addressed This Visit        Cardiovascular and Mediastinum    Coronary artery disease involving native coronary artery of native heart without angina pectoris       Genitourinary    BPH (benign prostatic hyperplasia)       Behavioral Health    Bipolar 1 disorder (HCC)     Stable.   On depakote.   Moods stable. No si/hi.             Neurology/Sleep    Mild cognitive impairment - Primary (Chronic)     Cotninue with supportive treatment.            History of CVA (cerebrovascular accident)     No new deficits.   Stable.   Continue with asa and statin.     Higher risk for transition to vascular dementia.   Monitor.               - Counseling Documentation: patient was counseled regarding: impressions and risks and benefits of treatment options    Chief Complaint     Routine Long term follow-up visit.    History of Present Illness     Prakash is seen for ltc fu at [facility].   He reports he had one episode of vomiting yesterday. No current symptoms.   No longer with n/v. No fever,no chills. No abdominal pain. No constipation.   Today he is feeling well.   No concerns today.   No issues per SNF staff.         The following portions of the patient's history were reviewed and updated as appropriate: allergies, current medications, past family history, past medical history, past social history, past surgical history and problem list.    Review of Systems     Review of Systems   Constitutional: Negative.  Negative for activity change, appetite change, chills and diaphoresis.   HENT:  Negative for congestion and dental problem.    Respiratory: Negative.  Negative for apnea, chest tightness, shortness of breath and wheezing.    Cardiovascular: Negative.  Negative for chest pain, palpitations and  leg swelling.   Gastrointestinal: Negative.  Negative for abdominal distention, abdominal pain, constipation, diarrhea and nausea.   Genitourinary: Negative.  Negative for difficulty urinating, dysuria and frequency.       Active Problem List     Patient Active Problem List   Diagnosis   • Multiple injuries   • Ambulatory dysfunction   • Falls frequently   • Prediabetes   • Coronary artery disease involving native coronary artery of native heart without angina pectoris   • Mild cognitive impairment   • Morbid obesity with BMI of 45.0-49.9, adult ([facility])   • Knee pain   • Multiple wounds of skin   • Thalamic infarct, acute ([facility])   • Basal ganglia infarction ([facility])   • Goals of care, counseling/discussion   • Gastroesophageal reflux disease with esophagitis   • Depression   • BPH (benign prostatic hyperplasia)   • History of CVA (cerebrovascular accident)   • Anemia   • Bilateral lower extremity edema   • Expiratory wheezing   • Bipolar 1 disorder ([facility])       Objective     Vitals:127/70, 76, 18, 98.3    Physical Exam  Vitals reviewed.   Constitutional:       General: He is not in acute distress.     Appearance: He is well-developed. He is obese. He is not ill-appearing.   HENT:      Head: Normocephalic and atraumatic.      Right Ear: External ear normal.      Left Ear: Ear canal and external ear normal.      Nose: Nose normal. No congestion or rhinorrhea.      Mouth/Throat:      Mouth: Mucous membranes are moist.      Pharynx: No oropharyngeal exudate or posterior oropharyngeal erythema.   Eyes:      Extraocular Movements: Extraocular movements intact.      Conjunctiva/sclera: Conjunctivae normal.      Pupils: Pupils are equal, round, and reactive to light.   Cardiovascular:      Rate and Rhythm: Normal rate and regular rhythm.      Heart sounds: Normal heart sounds. No murmur heard.     No friction rub. No gallop.   Pulmonary:      Effort: Pulmonary effort is normal. No respiratory distress.      Breath sounds: Normal  breath sounds. No wheezing or rales.   Chest:      Chest wall: No tenderness.   Abdominal:      General: Bowel sounds are normal. There is no distension.      Palpations: Abdomen is soft. There is no mass.      Tenderness: There is no abdominal tenderness. There is no guarding or rebound.   Musculoskeletal:         General: Normal range of motion.      Cervical back: Normal range of motion and neck supple.   Skin:     General: Skin is warm.      Capillary Refill: Capillary refill takes less than 2 seconds.   Neurological:      Mental Status: He is alert and oriented to person, place, and time. Mental status is at baseline.   Psychiatric:         Mood and Affect: Mood normal.         Behavior: Behavior normal.         Pertinent Laboratory/Diagnostic Studies:  Refer to facility chart.    Current Medications   Medications reviewed and updated in facility chart.

## 2024-08-23 ENCOUNTER — NURSING HOME VISIT (OUTPATIENT)
Dept: GERIATRICS | Facility: OTHER | Age: 74
End: 2024-08-23
Payer: MEDICARE

## 2024-08-23 DIAGNOSIS — G31.84 MILD COGNITIVE IMPAIRMENT: Chronic | ICD-10-CM

## 2024-08-23 DIAGNOSIS — R26.2 AMBULATORY DYSFUNCTION: ICD-10-CM

## 2024-08-23 DIAGNOSIS — N40.0 BENIGN PROSTATIC HYPERPLASIA, UNSPECIFIED WHETHER LOWER URINARY TRACT SYMPTOMS PRESENT: ICD-10-CM

## 2024-08-23 DIAGNOSIS — F31.9 BIPOLAR 1 DISORDER (HCC): ICD-10-CM

## 2024-08-23 DIAGNOSIS — U07.1 COVID: Primary | ICD-10-CM

## 2024-08-23 DIAGNOSIS — K21.00 GASTROESOPHAGEAL REFLUX DISEASE WITH ESOPHAGITIS WITHOUT HEMORRHAGE: ICD-10-CM

## 2024-08-23 DIAGNOSIS — I25.10 CORONARY ARTERY DISEASE INVOLVING NATIVE CORONARY ARTERY OF NATIVE HEART WITHOUT ANGINA PECTORIS: ICD-10-CM

## 2024-08-23 PROCEDURE — 99309 SBSQ NF CARE MODERATE MDM 30: CPT

## 2024-08-25 VITALS
SYSTOLIC BLOOD PRESSURE: 136 MMHG | TEMPERATURE: 97.5 F | RESPIRATION RATE: 18 BRPM | HEART RATE: 74 BPM | DIASTOLIC BLOOD PRESSURE: 70 MMHG | BODY MASS INDEX: 45.58 KG/M2 | OXYGEN SATURATION: 97 % | WEIGHT: 257.3 LBS

## 2024-08-25 NOTE — PROGRESS NOTES
[facility]  [address] [facility] [address] [phone]  [facility]  Code   32 (LTC)        NAME: Gaston Rucker  AGE: 73 y.o. SEX: male CODE STATUS: CPR    DATE OF ENCOUNTER: 8/23/2024    Assessment and Plan     1. COVID  Assessment & Plan:  Tested positive for Covid 8/14/24  Continued on isolation per LTC protocol through 8/23/24  Patient was asymptomatic  Vitamin C, D and zinc x 7 days  Denies respiratory symptoms  97% on RA, LSC  2. Coronary artery disease involving native coronary artery of native heart without angina pectoris  Assessment & Plan:  NSTEMI 2017  Denies SOB, CP  Continue ASA 81 mg daily  Continue Atorvastatin 40 mg daily  3. Gastroesophageal reflux disease with esophagitis without hemorrhage  Assessment & Plan:  Stable  Continue sucralfate BID  4. Benign prostatic hyperplasia, unspecified whether lower urinary tract symptoms present  Assessment & Plan:  Stable, denies urinary symptoms  Continue Flomax 0.4 mg daily  5. Bipolar 1 disorder (HCC)  Assessment & Plan:  Mood stable  Continue Depakote 125 mg at HS  6. Ambulatory dysfunction  Assessment & Plan:  Multifactorial in the setting of chronic medical conditions  Ambulates with wheelchair  Continue PT/OT as needed  Fall Precautions  Ensure adequate nutrition/hydration   Assist with ADL's/IADL's as needed    7. Mild cognitive impairment  Assessment & Plan:  AAO x 3, forgetful at times  Continue Depakote 125 mg at HS  Provide redirection, reorientation, distraction techniques  Fall Precautions  Assist with ADLs/IADLs  Avoid deliriogenic medications such as tramadol, benzodiazepines, anticholinergics, benadryl  Encourage Hydration/ Nutrition  Implement sleep hygiene, limit night time interuptions   Encourage participation in group activities when appropriate              All medications and routine orders were reviewed and updated as needed.    Chief Complaint     LTC follow up visit  Patient's care was coordinated  with nursing facility staff. Recent vitals, labs, and updated medications were review on [facility] system in facility.  Past Medical and Surgical History      Past Medical History:   Diagnosis Date    Anemia     Anxiety     Groves's esophagus     BPH (benign prostatic hyperplasia)     COVID-19     Depression     Esophageal ulcer     Hematemesis     HTN (hypertension) 12/05/2017    Hyperlipidemia     Hypertension     MCI (mild cognitive impairment)     Muscle weakness     TIA (transient ischemic attack)      History reviewed. No pertinent surgical history.  No Known Allergies       History of Present Illness     HPI  Gaston Rucker is a 73 year old male, he is a LTC resident of [facility] since 5/1/2020. Past Medical Hx including but not limited to GERD, HTN, CVA, BPH, depression, NSTEMI. He was seen in collaboration with nursing for medical mgmt and LTC follow up.     Gaston was seen and examined at bedside today. He is awake and alert, forgetful at times. On exam patient is in his wheelchair and does not appear to be in distress.  He denies pain, has a good appetite. Patient ambulates with wheelchair. Requires assistance with ADL's/IADL's. Patient recovering from COVID, denies respiratory symptoms.     He denies CP/SOB/N/V/D. Denies lightheadedness, dizziness, headaches, vision changes. Patient states they are eating well and staying hydrated. Patient is incontinent of bowel and bladder. Per review of SNF records, Patient is eating 3 meals per day, consuming  %. Last documented BM 8/23/24. No concerns from nursing at this time.    The patient's allergies, past medical, surgical, social and family history were reviewed and unchanged.    Review of Systems     Review of Systems   Constitutional:  Negative for activity change, appetite change and fever.   HENT: Negative.  Negative for congestion.    Eyes: Negative.    Respiratory: Negative.  Negative for cough, shortness of breath and  wheezing.    Cardiovascular:  Negative for chest pain and palpitations.   Gastrointestinal: Negative.  Negative for abdominal distention, abdominal pain, constipation, diarrhea, nausea and vomiting.   Endocrine: Negative.    Genitourinary: Negative.  Negative for difficulty urinating and dysuria.   Skin: Negative.    Allergic/Immunologic: Negative.    Neurological:  Positive for weakness. Negative for dizziness and headaches.   Hematological: Negative.    Psychiatric/Behavioral:  Negative for sleep disturbance.          Objective     Vitals:   Vitals:    08/25/24 1452   BP: 136/70   Pulse: 74   Resp: 18   Temp: 97.5 °F (36.4 °C)   SpO2: 97%             Physical Exam  Vitals and nursing note reviewed.   Constitutional:       General: He is not in acute distress.     Appearance: Normal appearance. He is not ill-appearing.   HENT:      Head: Normocephalic and atraumatic.      Nose: No congestion.   Eyes:      Conjunctiva/sclera: Conjunctivae normal.   Cardiovascular:      Rate and Rhythm: Normal rate and regular rhythm.      Heart sounds: Normal heart sounds.   Pulmonary:      Effort: Pulmonary effort is normal. No respiratory distress.      Breath sounds: Normal breath sounds. No wheezing.   Abdominal:      General: Bowel sounds are normal. There is no distension.      Palpations: Abdomen is soft.      Tenderness: There is no abdominal tenderness.   Skin:     General: Skin is warm.   Neurological:      Mental Status: Mental status is at baseline.      Motor: Weakness present.      Gait: Gait abnormal.   Psychiatric:         Mood and Affect: Mood normal.         Behavior: Behavior normal.         Pertinent Laboratory/Diagnostic Studies:   Reviewed in facility chart-stable      Current Medications   Medications reviewed and updated see facility MAR for details.      Current Outpatient Medications:     acetaminophen (TYLENOL) 325 mg tablet, Take 650 mg by mouth every 6 (six) hours as needed, Disp: , Rfl:     aspirin 81  "mg chewable tablet, Chew 1 tablet (81 mg total) daily, Disp: 60 tablet, Rfl: 0    atorvastatin (LIPITOR) 40 mg tablet, Take 1 tablet (40 mg total) by mouth daily with dinner, Disp: , Rfl:     Cholecalciferol 25 MCG (1000 UT) tablet, Take 2,000 Units by mouth daily, Disp: , Rfl:     divalproex sodium (DEPAKOTE SPRINKLE) 125 MG capsule, Take 1 capsule (125 mg total) by mouth daily at bedtime, Disp: , Rfl:     furosemide (LASIX) 20 mg tablet, Take 20 mg by mouth every morning, Disp: , Rfl:     multivitamin-iron-minerals-folic acid (THERAPEUTIC-M) TABS tablet, Take 1 tablet by mouth daily, Disp: , Rfl:     polyethylene glycol (MIRALAX) 17 g packet, Take 17 g by mouth daily as needed, Disp: , Rfl:     sucralfate (CARAFATE) 1 g tablet, Take 1 g by mouth 2 (two) times a day with meals, Disp: , Rfl:     tamsulosin (FLOMAX) 0.4 mg, Take 0.4 mg by mouth daily with dinner, Disp: , Rfl:      Please note:  Voice-recognition software may have been used in the preparation of this document.  Occasional wrong word or \"sound-alike\" substitutions may have occurred due to the inherent limitations of voice recognition software.  Interpretation should be guided by context.         SYEDA Mart  8/25/2024  3:05 PM    "

## 2024-08-25 NOTE — ASSESSMENT & PLAN NOTE
Tested positive for Covid 8/14/24  Continued on isolation per LTC protocol through 8/23/24  Patient was asymptomatic  Vitamin C, D and zinc x 7 days  Denies respiratory symptoms  97% on RA, LSC

## 2024-10-15 ENCOUNTER — NURSING HOME VISIT (OUTPATIENT)
Dept: GERIATRICS | Facility: OTHER | Age: 74
End: 2024-10-15
Payer: MEDICARE

## 2024-10-15 DIAGNOSIS — Z86.73 HISTORY OF CVA (CEREBROVASCULAR ACCIDENT): ICD-10-CM

## 2024-10-15 DIAGNOSIS — R73.03 PREDIABETES: ICD-10-CM

## 2024-10-15 DIAGNOSIS — F31.9 BIPOLAR 1 DISORDER (HCC): ICD-10-CM

## 2024-10-15 DIAGNOSIS — I10 PRIMARY HYPERTENSION: ICD-10-CM

## 2024-10-15 DIAGNOSIS — G31.84 MILD COGNITIVE IMPAIRMENT: Primary | ICD-10-CM

## 2024-10-15 PROCEDURE — 99309 SBSQ NF CARE MODERATE MDM 30: CPT | Performed by: FAMILY MEDICINE

## 2024-10-15 NOTE — PROGRESS NOTES
[facility]  [address] [address] [phone]  [facility]      NAME: Gaston Rucker  AGE: 73 y.o. SEX: male [phone]    DATE OF ENCOUNTER: 10/15/2024    Assessment and Plan     1. History of CVA (cerebrovascular accident)  Stable. No new deficits    2. Prediabetes  Monitor, update a1c    3. Bipolar 1 disorder (HCC)  Stabel on depakote.   No depression or estefany    4. Mild cognitive impairment  Continue with supportive treatment.     5. Primary hypertension  Doing well. No current medications.           Chief Complaint     Routine Long term follow-up visit.    History of Present Illness     Prakash is a 73-year-old male who is seen for long-term care follow-up at [facility].  He is seen at the bedside with no complaints.  He reports he is doing well.  Doing well with medications.  His moods have been good.  He has known history of bipolar disorder, cognitive impairment, hyperlipidemia, prediabetes.  No concerns per SNF staff.        The following portions of the patient's history were reviewed and updated as appropriate: allergies, current medications, past family history, past medical history, past social history, past surgical history and problem list.    Review of Systems     Review of Systems   Constitutional: Negative.  Negative for activity change, appetite change, chills and diaphoresis.   HENT:  Negative for congestion and dental problem.    Respiratory: Negative.  Negative for apnea, chest tightness, shortness of breath and wheezing.    Cardiovascular: Negative.  Negative for chest pain, palpitations and leg swelling.   Gastrointestinal: Negative.  Negative for abdominal distention, abdominal pain, constipation, diarrhea and nausea.   Genitourinary: Negative.  Negative for difficulty urinating, dysuria and frequency.       Active Problem List     Patient Active Problem List   Diagnosis    Multiple injuries    Ambulatory dysfunction    Falls frequently     Prediabetes    Coronary artery disease involving native coronary artery of native heart without angina pectoris    Mild cognitive impairment    Morbid obesity with BMI of 45.0-49.9, adult ([facility])    Knee pain    Multiple wounds of skin    Thalamic infarct, acute ([facility])    Basal ganglia infarction ([facility])    COVID    Goals of care, counseling/discussion    Gastroesophageal reflux disease with esophagitis    Depression    BPH (benign prostatic hyperplasia)    History of CVA (cerebrovascular accident)    Anemia    Bilateral lower extremity edema    Expiratory wheezing    Bipolar 1 disorder ([facility])       Objective     Vitals: Blood pressure 126/73, respirations 18, pulse 70, weight 261.2 pounds, blood sugar 119, O2 sat 95% on room air.    Physical Exam  Vitals reviewed.   Constitutional:       General: He is not in acute distress.     Appearance: Normal appearance. He is well-developed.   HENT:      Head: Normocephalic and atraumatic.      Right Ear: External ear normal.      Left Ear: External ear normal.      Nose: Nose normal.      Mouth/Throat:      Mouth: Mucous membranes are moist.   Eyes:      Conjunctiva/sclera: Conjunctivae normal.      Pupils: Pupils are equal, round, and reactive to light.   Cardiovascular:      Rate and Rhythm: Normal rate and regular rhythm.      Heart sounds: Normal heart sounds. No murmur heard.     No friction rub. No gallop.   Pulmonary:      Effort: Pulmonary effort is normal. No respiratory distress.      Breath sounds: Normal breath sounds. No wheezing or rales.   Chest:      Chest wall: No tenderness.   Abdominal:      General: Bowel sounds are normal. There is no distension.      Palpations: Abdomen is soft. There is no mass.      Tenderness: There is no abdominal tenderness. There is no guarding or rebound.   Musculoskeletal:         General: Normal range of motion.      Cervical back: Normal range of motion and neck supple.   Skin:     General: Skin is warm.   Neurological:      Mental  Status: He is alert and oriented to person, place, and time.   Psychiatric:         Mood and Affect: Mood normal.         Behavior: Behavior normal.         Thought Content: Thought content normal.         Judgment: Judgment normal.         Pertinent Laboratory/Diagnostic Studies:  Refer to facility chart.    Current Medications   Medications reviewed and updated in facility chart.

## 2024-10-16 ENCOUNTER — TELEPHONE (OUTPATIENT)
Dept: OTHER | Facility: OTHER | Age: 74
End: 2024-10-16

## 2024-10-16 NOTE — TELEPHONE ENCOUNTER
[phone] ask for unit 1- Sandra    Calling again to deliver critical labs- requesting Dr Matamoros call back ASAP    Hemoglobin 5.8, hematocrit 21    Dr Lynn confirmed receipt of message with response.

## 2024-10-17 ENCOUNTER — HOSPITAL ENCOUNTER (INPATIENT)
Facility: HOSPITAL | Age: 74
LOS: 4 days | Discharge: NURSING, REHAB OR LONG-TERM CARE | DRG: 377 | End: 2024-10-21
Attending: EMERGENCY MEDICINE
Payer: MEDICARE

## 2024-10-17 DIAGNOSIS — D72.829 LEUKOCYTOSIS: ICD-10-CM

## 2024-10-17 DIAGNOSIS — D64.9 ANEMIA: Primary | ICD-10-CM

## 2024-10-17 DIAGNOSIS — R19.5 HEME POSITIVE STOOL: ICD-10-CM

## 2024-10-17 DIAGNOSIS — K92.1 BLACK TARRY STOOLS: ICD-10-CM

## 2024-10-17 DIAGNOSIS — K92.2 GIB (GASTROINTESTINAL BLEEDING): ICD-10-CM

## 2024-10-17 DIAGNOSIS — K92.2 UPPER GI BLEED: ICD-10-CM

## 2024-10-17 DIAGNOSIS — K92.2 GI BLEED: ICD-10-CM

## 2024-10-17 DIAGNOSIS — K92.1 MELENA: ICD-10-CM

## 2024-10-17 PROBLEM — I50.30 (HFPEF) HEART FAILURE WITH PRESERVED EJECTION FRACTION (HCC): Status: ACTIVE | Noted: 2024-10-17

## 2024-10-17 LAB
ABO GROUP BLD: NORMAL
ABO GROUP BLD: NORMAL
ALBUMIN SERPL BCG-MCNC: 3.7 G/DL (ref 3.5–5)
ALP SERPL-CCNC: 144 U/L (ref 34–104)
ALT SERPL W P-5'-P-CCNC: 11 U/L (ref 7–52)
ANION GAP SERPL CALCULATED.3IONS-SCNC: 5 MMOL/L (ref 4–13)
APTT PPP: 29 SECONDS (ref 23–34)
AST SERPL W P-5'-P-CCNC: 37 U/L (ref 13–39)
BASOPHILS # BLD AUTO: 0.09 THOUSANDS/ΜL (ref 0–0.1)
BASOPHILS NFR BLD AUTO: 1 % (ref 0–1)
BILIRUB SERPL-MCNC: 0.32 MG/DL (ref 0.2–1)
BLD GP AB SCN SERPL QL: POSITIVE
BLOOD GROUP ANTIBODIES SERPL: NORMAL
BLOOD GROUP ANTIBODIES SERPL: NORMAL
BUN SERPL-MCNC: 20 MG/DL (ref 5–25)
CALCIUM SERPL-MCNC: 8.9 MG/DL (ref 8.4–10.2)
CHLORIDE SERPL-SCNC: 109 MMOL/L (ref 96–108)
CO2 SERPL-SCNC: 26 MMOL/L (ref 21–32)
CREAT SERPL-MCNC: 0.93 MG/DL (ref 0.6–1.3)
EOSINOPHIL # BLD AUTO: 0.18 THOUSAND/ΜL (ref 0–0.61)
EOSINOPHIL NFR BLD AUTO: 1 % (ref 0–6)
ERYTHROCYTE [DISTWIDTH] IN BLOOD BY AUTOMATED COUNT: 17.4 % (ref 11.6–15.1)
EXT FECAL OCCULT BLOOD SCREEN: POSITIVE
EXT. CONTROL: ABNORMAL
FY SUP(A) AG RBC QL: NEGATIVE
GFR SERPL CREATININE-BSD FRML MDRD: 80 ML/MIN/1.73SQ M
GLUCOSE SERPL-MCNC: 112 MG/DL (ref 65–140)
HCT VFR BLD AUTO: 23.8 % (ref 36.5–49.3)
HGB BLD-MCNC: 6.3 G/DL (ref 12–17)
IMM GRANULOCYTES # BLD AUTO: 0.06 THOUSAND/UL (ref 0–0.2)
IMM GRANULOCYTES NFR BLD AUTO: 0 % (ref 0–2)
INR PPP: 1.17 (ref 0.85–1.19)
KELL GROUP AG RBC: NEGATIVE
LYMPHOCYTES # BLD AUTO: 2.39 THOUSANDS/ΜL (ref 0.6–4.47)
LYMPHOCYTES NFR BLD AUTO: 17 % (ref 14–44)
MCH RBC QN AUTO: 20.5 PG (ref 26.8–34.3)
MCHC RBC AUTO-ENTMCNC: 26.5 G/DL (ref 31.4–37.4)
MCV RBC AUTO: 78 FL (ref 82–98)
MONOCYTES # BLD AUTO: 1.46 THOUSAND/ΜL (ref 0.17–1.22)
MONOCYTES NFR BLD AUTO: 11 % (ref 4–12)
NEUTROPHILS # BLD AUTO: 9.7 THOUSANDS/ΜL (ref 1.85–7.62)
NEUTS SEG NFR BLD AUTO: 70 % (ref 43–75)
NRBC BLD AUTO-RTO: 0 /100 WBCS
PLATELET # BLD AUTO: 395 THOUSANDS/UL (ref 149–390)
PMV BLD AUTO: 11.6 FL (ref 8.9–12.7)
POTASSIUM SERPL-SCNC: 4.7 MMOL/L (ref 3.5–5.3)
PROT SERPL-MCNC: 6.7 G/DL (ref 6.4–8.4)
PROTHROMBIN TIME: 15.6 SECONDS (ref 12.3–15)
RBC # BLD AUTO: 3.07 MILLION/UL (ref 3.88–5.62)
RH BLD: POSITIVE
RH BLD: POSITIVE
SODIUM SERPL-SCNC: 140 MMOL/L (ref 135–147)
SPECIMEN EXPIRATION DATE: NORMAL
WBC # BLD AUTO: 13.88 THOUSAND/UL (ref 4.31–10.16)

## 2024-10-17 PROCEDURE — 86901 BLOOD TYPING SEROLOGIC RH(D): CPT

## 2024-10-17 PROCEDURE — 82728 ASSAY OF FERRITIN: CPT

## 2024-10-17 PROCEDURE — 86922 COMPATIBILITY TEST ANTIGLOB: CPT

## 2024-10-17 PROCEDURE — 99284 EMERGENCY DEPT VISIT MOD MDM: CPT

## 2024-10-17 PROCEDURE — 85025 COMPLETE CBC W/AUTO DIFF WBC: CPT

## 2024-10-17 PROCEDURE — 85610 PROTHROMBIN TIME: CPT

## 2024-10-17 PROCEDURE — 86921 COMPATIBILITY TEST INCUBATE: CPT

## 2024-10-17 PROCEDURE — 99223 1ST HOSP IP/OBS HIGH 75: CPT | Performed by: HOSPITALIST

## 2024-10-17 PROCEDURE — 83550 IRON BINDING TEST: CPT

## 2024-10-17 PROCEDURE — 82607 VITAMIN B-12: CPT

## 2024-10-17 PROCEDURE — 36430 TRANSFUSION BLD/BLD COMPNT: CPT

## 2024-10-17 PROCEDURE — 99285 EMERGENCY DEPT VISIT HI MDM: CPT | Performed by: EMERGENCY MEDICINE

## 2024-10-17 PROCEDURE — 86902 BLOOD TYPE ANTIGEN DONOR EA: CPT

## 2024-10-17 PROCEDURE — 30233N1 TRANSFUSION OF NONAUTOLOGOUS RED BLOOD CELLS INTO PERIPHERAL VEIN, PERCUTANEOUS APPROACH: ICD-10-PCS | Performed by: HOSPITALIST

## 2024-10-17 PROCEDURE — 87081 CULTURE SCREEN ONLY: CPT

## 2024-10-17 PROCEDURE — 86850 RBC ANTIBODY SCREEN: CPT

## 2024-10-17 PROCEDURE — 82746 ASSAY OF FOLIC ACID SERUM: CPT

## 2024-10-17 PROCEDURE — 83540 ASSAY OF IRON: CPT

## 2024-10-17 PROCEDURE — 86870 RBC ANTIBODY IDENTIFICATION: CPT

## 2024-10-17 PROCEDURE — 86900 BLOOD TYPING SEROLOGIC ABO: CPT

## 2024-10-17 PROCEDURE — 85730 THROMBOPLASTIN TIME PARTIAL: CPT

## 2024-10-17 PROCEDURE — 86905 BLOOD TYPING RBC ANTIGENS: CPT

## 2024-10-17 PROCEDURE — 36415 COLL VENOUS BLD VENIPUNCTURE: CPT

## 2024-10-17 PROCEDURE — 80053 COMPREHEN METABOLIC PANEL: CPT

## 2024-10-17 PROCEDURE — P9016 RBC LEUKOCYTES REDUCED: HCPCS

## 2024-10-17 RX ORDER — ATORVASTATIN CALCIUM 40 MG/1
40 TABLET, FILM COATED ORAL
Status: DISCONTINUED | OUTPATIENT
Start: 2024-10-17 | End: 2024-10-21 | Stop reason: HOSPADM

## 2024-10-17 RX ORDER — FUROSEMIDE 40 MG/1
20 TABLET ORAL EVERY MORNING
Status: DISCONTINUED | OUTPATIENT
Start: 2024-10-18 | End: 2024-10-21 | Stop reason: HOSPADM

## 2024-10-17 RX ORDER — ACETAMINOPHEN 325 MG/1
650 TABLET ORAL EVERY 6 HOURS PRN
Status: DISCONTINUED | OUTPATIENT
Start: 2024-10-17 | End: 2024-10-21 | Stop reason: HOSPADM

## 2024-10-17 RX ORDER — ONDANSETRON 2 MG/ML
4 INJECTION INTRAMUSCULAR; INTRAVENOUS EVERY 6 HOURS PRN
Status: DISCONTINUED | OUTPATIENT
Start: 2024-10-17 | End: 2024-10-21 | Stop reason: HOSPADM

## 2024-10-17 RX ORDER — DIVALPROEX SODIUM 125 MG/1
125 CAPSULE, COATED PELLETS ORAL
Status: DISCONTINUED | OUTPATIENT
Start: 2024-10-17 | End: 2024-10-21 | Stop reason: HOSPADM

## 2024-10-17 RX ORDER — PANTOPRAZOLE SODIUM 40 MG/10ML
40 INJECTION, POWDER, LYOPHILIZED, FOR SOLUTION INTRAVENOUS EVERY 12 HOURS SCHEDULED
Status: DISCONTINUED | OUTPATIENT
Start: 2024-10-17 | End: 2024-10-21 | Stop reason: HOSPADM

## 2024-10-17 RX ORDER — TAMSULOSIN HYDROCHLORIDE 0.4 MG/1
0.4 CAPSULE ORAL
Status: DISCONTINUED | OUTPATIENT
Start: 2024-10-17 | End: 2024-10-21 | Stop reason: HOSPADM

## 2024-10-17 RX ORDER — SUCRALFATE 1 G/1
1 TABLET ORAL 2 TIMES DAILY WITH MEALS
Status: DISCONTINUED | OUTPATIENT
Start: 2024-10-18 | End: 2024-10-21 | Stop reason: HOSPADM

## 2024-10-17 RX ORDER — ASPIRIN 81 MG/1
81 TABLET, CHEWABLE ORAL DAILY
Status: DISCONTINUED | OUTPATIENT
Start: 2024-10-18 | End: 2024-10-21 | Stop reason: HOSPADM

## 2024-10-17 RX ORDER — PANTOPRAZOLE SODIUM 40 MG/1
40 TABLET, DELAYED RELEASE ORAL DAILY
Status: ON HOLD | COMMUNITY
End: 2024-10-21

## 2024-10-17 RX ADMIN — ATORVASTATIN CALCIUM 40 MG: 40 TABLET, FILM COATED ORAL at 19:44

## 2024-10-17 RX ADMIN — TAMSULOSIN HYDROCHLORIDE 0.4 MG: 0.4 CAPSULE ORAL at 19:44

## 2024-10-17 RX ADMIN — PANTOPRAZOLE SODIUM 40 MG: 40 INJECTION, POWDER, FOR SOLUTION INTRAVENOUS at 20:19

## 2024-10-17 RX ADMIN — DIVALPROEX SODIUM 125 MG: 125 CAPSULE ORAL at 22:31

## 2024-10-17 NOTE — ASSESSMENT & PLAN NOTE
Home regimen includes aspirin and statin  No residual focal deficits on exam however patient is wheelchair-bound  Resides at [facility] for many years    Plan-  Will hold ASA in the setting of suspected GIB  Likely continue tomorrow after EGD

## 2024-10-17 NOTE — ED PROVIDER NOTES
Time reflects when diagnosis was documented in both MDM as applicable and the Disposition within this note       Time User Action Codes Description Comment    10/17/2024  6:34 PM BaltazarGenet rogel Add [D64.9] Anemia     10/17/2024  6:34 PM Baltazar Genet Add [K92.2] GI bleed     10/17/2024  6:34 PM Baltazar Genet Add [D72.829] Leukocytosis     10/17/2024  7:12 PM Sapone, Prabha Add [K92.2] GIB (gastrointestinal bleeding)     10/17/2024  7:12 PM Sapone, Prabha Add [K92.2] Upper GI bleed     10/17/2024  7:13 PM Sapone, Prabha Add [K92.1] Black tarry stools           ED Disposition       ED Disposition   Admit    Condition   Stable    Date/Time   Thu Oct 17, 2024  6:35 PM    Comment   Case was discussed with SLAVA and the patient's admission status was agreed to be Admission Status: observation status to the service of Dr. Monzon .               Assessment & Plan       Medical Decision Making  Amount and/or Complexity of Data Reviewed  Labs: ordered. Decision-making details documented in ED Course.    Risk  Decision regarding hospitalization.      Gaston Rucker is a 73 y.o. male presenting with hemoglobin of 5.8 on outpatient lab work.  Patient himself denies any symptoms, denies rectal bleeding, hemoptysis, hematuria, abdominal pain, fatigue, lightheadedness, dizziness. Vitals unremarkable. Exam remarkable for conjunctival pallor, abdomen distended but nontender, heart regular rate and rhythm, lungs clear bilaterally.  Rectal exam without external hemorrhoid, Hemoccult positive, no bright red blood per rectum.    DDx including but not limited to: anemia, iron deficiency, GI bleed, lab error, other active bleeding, occult cancer, other malignancy, adverse reaction.     Plan:  - Will examine CBC to evaluate for hematologic abnormalities and infection  - Will examine CMP to evaluate for metabolic abnormalities  - Will get coags as patient is bleeding  - Will get T&S in preparation for possible  transfusion  - Will consent for blood  - If hemoglobin is 7, will transfuse    Will continue to monitor while patient is in the ED and reconsider further evaluation or intervention as needed.    See ED course for further updates and interpretation of results.    Based on these results and H&P, suspect GI bleed as source of anemia on outpatient lab work given positive Hemoccult.  At this time patient would most benefit from admission for further examination and treatment of anemia as well as possible GI bleed.    Results, clinical impressions, and plan were discussed with patient. They expressed understanding and were in agreement with plan. Patient was given the opportunity to ask questions in ED. All questions and concerns were addressed in ED.    After evaluation and workup in the emergency department Discussed patient's case with [facility] regarding admission who accepted the patient for further evaluation and management under Dr. Monzon (SLAVA).    ED Course as of 10/17/24 2339   Thu Oct 17, 2024   1722 CBC and differential(!)  Mild leukocytosis to 13.88, anemia to 6.3, platelets slightly elevated   1722 Comprehensive metabolic panel(!)  Normal electrolytes except for slightly elevated chloride to 109, potassium 4.7 but is hemolyzed, normal creatinine, normal glucose, normal liver function except for alk phos which is slightly elevated at 144   1722 EXT Fecal Occult Blood (Ref: Negative)(!): Positive  No bright red blood per rectum.       Medications   acetaminophen (TYLENOL) tablet 650 mg (has no administration in time range)   aspirin chewable tablet 81 mg ( Oral Held by provider 10/17/24 1915)   atorvastatin (LIPITOR) tablet 40 mg (40 mg Oral Given 10/17/24 1944)   divalproex sodium (DEPAKOTE SPRINKLE) capsule 125 mg (has no administration in time range)   furosemide (LASIX) tablet 20 mg ( Oral Held by provider 10/17/24 1915)   multivitamin stress formula tablet 1 tablet (has no administration in time range)    pantoprazole (PROTONIX) injection 40 mg (40 mg Intravenous Given 10/17/24 2019)   sucralfate (CARAFATE) tablet 1 g (has no administration in time range)   tamsulosin (FLOMAX) capsule 0.4 mg (0.4 mg Oral Given 10/17/24 1944)   ondansetron (ZOFRAN) injection 4 mg (has no administration in time range)       ED Risk Strat Scores                                               History of Present Illness       Chief Complaint   Patient presents with    Abnormal Lab     Patient brought in by ERS from [facility] with a HG of 5.8       Past Medical History:   Diagnosis Date    Anemia     Anxiety     Groves's esophagus     BPH (benign prostatic hyperplasia)     COVID-19     Depression     Esophageal ulcer     Hematemesis     HTN (hypertension) 12/05/2017    Hyperlipidemia     Hypertension     MCI (mild cognitive impairment)     Muscle weakness     TIA (transient ischemic attack)       No past surgical history on file.   No family history on file.   Social History     Tobacco Use    Smoking status: Never    Smokeless tobacco: Never   Substance Use Topics    Alcohol use: Not Currently    Drug use: No      E-Cigarette/Vaping      E-Cigarette/Vaping Substances      I have reviewed and agree with the history as documented.     HPI    Gaston Rucker is a 73 y.o. male with history of CVA, heart failure with preserved ejection fraction, hypertension, hyperlipidemia, mild cognitive impairment, GERD, BPH, bipolar presenting for anemia.    Patient presents to the ED from [facility] for hemoglobin of 5.8 on outpatient lab work.  Patient himself has no symptoms, denies lightheadedness, dizziness, shortness of breath, palpitations, chest pain, abdominal pain, dysuria, frequency, hematuria, rectal bleeding, hemoptysis, fevers, chills.  Patient denies history of transfusions in the past, is unsure why he could be anemic.  Denies recent medication changes, recent falls, recent illnesses.    Review of  Systems   Constitutional:  Negative for chills and fever.   HENT:  Negative for congestion, rhinorrhea and sore throat.    Eyes:  Negative for pain and visual disturbance.   Respiratory:  Negative for cough, chest tightness, shortness of breath, wheezing and stridor.    Cardiovascular:  Negative for chest pain, palpitations and leg swelling.   Gastrointestinal:  Negative for abdominal pain, constipation, diarrhea, nausea and vomiting.   Genitourinary:  Negative for dysuria and hematuria.   Musculoskeletal:  Negative for arthralgias and back pain.   Skin:  Positive for pallor. Negative for color change and rash.   Neurological:  Negative for dizziness, syncope, light-headedness, numbness and headaches.   Psychiatric/Behavioral:  Negative for behavioral problems.    All other systems reviewed and are negative.          Objective       ED Triage Vitals   Temperature Pulse Blood Pressure Respirations SpO2 Patient Position - Orthostatic VS   10/17/24 1621 10/17/24 1621 10/17/24 1621 10/17/24 1621 10/17/24 1621 10/17/24 1621   98.2 °F (36.8 °C) 90 126/70 18 96 % Lying      Temp Source Heart Rate Source BP Location FiO2 (%) Pain Score    10/17/24 1621 10/17/24 1930 10/17/24 1621 -- 10/17/24 1621    Oral Monitor Right arm  No Pain      Vitals      Date and Time Temp Pulse SpO2 Resp BP Pain Score FACES Pain Rating User   10/17/24 2245 98.5 °F (36.9 °C) 68 100 % 17 134/75 -- -- DII   10/17/24 2236 98.9 °F (37.2 °C) 75 98 % 17 137/78 -- -- DII   10/17/24 2223 98.4 °F (36.9 °C) 80 94 % 16 133/88 -- -- DII   10/17/24 2222 98.4 °F (36.9 °C) 78 -- 16 133/88 -- -- RICARDO   10/17/24 2207 -- -- -- -- -- 2 -- RICARDO   10/17/24 2039 98.1 °F (36.7 °C) -- -- 18 155/89 -- -- DII   10/17/24 1930 -- 84 97 % 18 161/67 -- -- MF   10/17/24 1621 98.2 °F (36.8 °C) 90 96 % 18 126/70 No Pain -- SB            Physical Exam  Vitals and nursing note reviewed.   Constitutional:       Appearance: Normal appearance. He is well-developed. He is not  toxic-appearing.   HENT:      Head: Normocephalic and atraumatic.      Nose: No rhinorrhea.      Mouth/Throat:      Mouth: Mucous membranes are moist.      Pharynx: Oropharynx is clear.   Eyes:      Extraocular Movements: Extraocular movements intact.      Conjunctiva/sclera: Conjunctivae normal.   Cardiovascular:      Rate and Rhythm: Normal rate and regular rhythm.      Pulses: Normal pulses.      Heart sounds: Normal heart sounds. No murmur heard.  Pulmonary:      Effort: Pulmonary effort is normal. No respiratory distress.      Breath sounds: Normal breath sounds.   Abdominal:      General: Abdomen is flat. Bowel sounds are normal. There is distension.      Palpations: Abdomen is soft.      Tenderness: There is no abdominal tenderness. There is no right CVA tenderness, left CVA tenderness, guarding or rebound.   Musculoskeletal:      Cervical back: Neck supple.   Skin:     General: Skin is warm and dry.      Capillary Refill: Capillary refill takes less than 2 seconds.      Coloration: Skin is pale.   Neurological:      General: No focal deficit present.      Mental Status: He is alert and oriented to person, place, and time.   Psychiatric:         Mood and Affect: Mood normal.         Behavior: Behavior normal.         Results Reviewed       Procedure Component Value Units Date/Time    Protime-INR [[id number]]  (Abnormal) Collected: 10/17/24 1902    Lab Status: Final result Specimen: Blood from Arm, Right Updated: 10/17/24 2006     Protime 15.6 seconds      INR 1.17    Narrative:      INR Therapeutic Range    Indication                                             INR Range      Atrial Fibrillation                                               2.0-3.0  Hypercoagulable State                                    2.0.2.3  Left Ventricular Asist Device                            2.0-3.0  Mechanical Heart Valve                                  -    Aortic(with afib, MI, embolism, HF, LA enlargement,    and/or  coagulopathy)                                     2.0-3.0 (2.5-3.5)     Mitral                                                             2.5-3.5  Prosthetic/Bioprosthetic Heart Valve               2.0-3.0  Venous thromboembolism (VTE: VT, PE        2.0-3.0    APTT [[id number]]  (Normal) Collected: 10/17/24 1902    Lab Status: Final result Specimen: Blood from Arm, Right Updated: 10/17/24 2006     PTT 29 seconds     Hemoglobin and hematocrit, blood [[id number]]     Lab Status: No result Specimen: Blood     MRSA culture [[id number]] Collected: 10/17/24 1946    Lab Status: In process Specimen: Nares from Nose Updated: 10/17/24 1954    Folate [[id number]] Collected: 10/17/24 1641    Lab Status: In process Specimen: Blood from Arm, Right Updated: 10/17/24 1922    Vitamin B12 [[id number]] Collected: 10/17/24 1641    Lab Status: In process Specimen: Blood from Arm, Right Updated: 10/17/24 1922    TIBC Panel (incl. Iron, TIBC, % Iron Saturation) [[id number]] Collected: 10/17/24 1641    Lab Status: In process Specimen: Blood from Arm, Right Updated: 10/17/24 1922    Ferritin [[id number]] Collected: 10/17/24 1641    Lab Status: In process Specimen: Blood from Arm, Right Updated: 10/17/24 1922    POCT occult blood stool [[id number]]  (Abnormal) Collected: 10/17/24 1722    Lab Status: In process Specimen: Stool Updated: 10/17/24 1722     EXT Fecal Occult Blood Positive     Control Valid    Comprehensive metabolic panel [[id number]]  (Abnormal) Collected: 10/17/24 1641    Lab Status: Final result Specimen: Blood from Arm, Right Updated: 10/17/24 1711     Sodium 140 mmol/L      Potassium 4.7 mmol/L      Chloride 109 mmol/L      CO2 26 mmol/L      ANION GAP 5 mmol/L      BUN 20 mg/dL      Creatinine 0.93 mg/dL      Glucose 112 mg/dL      Calcium 8.9 mg/dL      AST 37 U/L      ALT 11 U/L      Alkaline Phosphatase 144 U/L      Total Protein 6.7 g/dL      Albumin 3.7 g/dL      Total Bilirubin 0.32 mg/dL      eGFR 80 ml/min/1.73sq m      Narrative:      National Kid[employer] Foundation guidelines for Chronic Kidney Disease (CKD):     Stage 1 with normal or high GFR (GFR > 90 mL/min/1.73 square meters)    Stage 2 Mild CKD (GFR = 60-89 mL/min/1.73 square meters)    Stage 3A Moderate CKD (GFR = 45-59 mL/min/1.73 square meters)    Stage 3B Moderate CKD (GFR = 30-44 mL/min/1.73 square meters)    Stage 4 Severe CKD (GFR = 15-29 mL/min/1.73 square meters)    Stage 5 End Stage CKD (GFR <15 mL/min/1.73 square meters)  Note: GFR calculation is accurate only with a steady state creatinine    CBC and differential [[id number]]  (Abnormal) Collected: 10/17/24 1641    Lab Status: Final result Specimen: Blood from Arm, Right Updated: 10/17/24 1654     WBC 13.88 Thousand/uL      RBC 3.07 Million/uL      Hemoglobin 6.3 g/dL      Hematocrit 23.8 %      MCV 78 fL      MCH 20.5 pg      MCHC 26.5 g/dL      RDW 17.4 %      MPV 11.6 fL      Platelets 395 Thousands/uL      nRBC 0 /100 WBCs      Segmented % 70 %      Immature Grans % 0 %      Lymphocytes % 17 %      Monocytes % 11 %      Eosinophils Relative 1 %      Basophils Relative 1 %      Absolute Neutrophils 9.70 Thousands/µL      Absolute Immature Grans 0.06 Thousand/uL      Absolute Lymphocytes 2.39 Thousands/µL      Absolute Monocytes 1.46 Thousand/µL      Eosinophils Absolute 0.18 Thousand/µL      Basophils Absolute 0.09 Thousands/µL             No orders to display       Procedures    ED Medication and Procedure Management   Prior to Admission Medications   Prescriptions Last Dose Informant Patient Reported? Taking?   Cholecalciferol 25 MCG (1000 UT) tablet 10/17/2024  Yes Yes   Sig: Take 2,000 Units by mouth daily   acetaminophen (TYLENOL) 325 mg tablet 10/17/2024  Yes Yes   Sig: Take 650 mg by mouth every 6 (six) hours as needed   aspirin 81 mg chewable tablet 10/17/2024  No Yes   Sig: Chew 1 tablet (81 mg total) daily   atorvastatin (LIPITOR) 40 mg tablet 10/17/2024  No Yes   Sig: Take 1 tablet (40 mg total)  by mouth daily with dinner   divalproex sodium (DEPAKOTE SPRINKLE) 125 MG capsule 10/17/2024  No Yes   Sig: Take 1 capsule (125 mg total) by mouth daily at bedtime   furosemide (LASIX) 20 mg tablet 10/17/2024  Yes Yes   Sig: Take 20 mg by mouth every morning   multivitamin-iron-minerals-folic acid (THERAPEUTIC-M) TABS tablet 10/17/2024  Yes Yes   Sig: Take 1 tablet by mouth daily   pantoprazole (PROTONIX) 40 mg tablet 10/17/2024  Yes Yes   Sig: Take 40 mg by mouth daily   polyethylene glycol (MIRALAX) 17 g packet 10/17/2024  Yes Yes   Sig: Take 17 g by mouth daily as needed   sucralfate (CARAFATE) 1 g tablet 10/17/2024  Yes Yes   Sig: Take 1 g by mouth 2 (two) times a day with meals   tamsulosin (FLOMAX) 0.4 mg 10/16/2024  Yes Yes   Sig: Take 0.4 mg by mouth daily with dinner      Facility-Administered Medications: None     Current Discharge Medication List        CONTINUE these medications which have NOT CHANGED    Details   acetaminophen (TYLENOL) 325 mg tablet Take 650 mg by mouth every 6 (six) hours as needed      aspirin 81 mg chewable tablet Chew 1 tablet (81 mg total) daily  Qty: 60 tablet, Refills: 0    Associated Diagnoses: Ischemic stroke (HCC); Thalamic infarct, acute (HCC)      atorvastatin (LIPITOR) 40 mg tablet Take 1 tablet (40 mg total) by mouth daily with dinner    Associated Diagnoses: History of CVA (cerebrovascular accident)      Cholecalciferol 25 MCG (1000 UT) tablet Take 2,000 Units by mouth daily      divalproex sodium (DEPAKOTE SPRINKLE) 125 MG capsule Take 1 capsule (125 mg total) by mouth daily at bedtime    Associated Diagnoses: Depression, unspecified depression type      furosemide (LASIX) 20 mg tablet Take 20 mg by mouth every morning      multivitamin-iron-minerals-folic acid (THERAPEUTIC-M) TABS tablet Take 1 tablet by mouth daily      pantoprazole (PROTONIX) 40 mg tablet Take 40 mg by mouth daily      polyethylene glycol (MIRALAX) 17 g packet Take 17 g by mouth daily as needed       sucralfate (CARAFATE) 1 g tablet Take 1 g by mouth 2 (two) times a day with meals      tamsulosin (FLOMAX) 0.4 mg Take 0.4 mg by mouth daily with dinner           No discharge procedures on file.  ED SEPSIS DOCUMENTATION   Time reflects when diagnosis was documented in both MDM as applicable and the Disposition within this note       Time User Action Codes Description Comment    10/17/2024  6:34 PM Genet Baltazar [D64.9] Anemia     10/17/2024  6:34 PM Genet Baltazar [K92.2] GI bleed     10/17/2024  6:34 PM Genet Baltazar [D72.829] Leukocytosis     10/17/2024  7:12 PM Prabha Alejandre Add [K92.2] GIB (gastrointestinal bleeding)     10/17/2024  7:12 PM Prabha Alejandre Add [K92.2] Upper GI bleed     10/17/2024  7:13 PM Prabha Alejandre Add [K92.1] Black tarry stools                  Genet Baltazar MD  10/17/24 5358

## 2024-10-17 NOTE — ASSESSMENT & PLAN NOTE
"Wt Readings from Last 3 Encounters:   08/25/24 117 kg (257 lb 4.8 oz)   05/20/24 116 kg (256 lb)   01/26/24 117 kg (258 lb)   No results found for: \"LVEF\", \"BNP\"    Patient appears euvolemic. To hypovolemic  Home regimen inlcudes Lasix 20mg QD  Last ECHO 2019 EF 60%, grade 1 diastolic dysfunction    Plan:  GDMT:  Diuretic: Lasix , B-Blocker: None, ACE/ARB/ARNi: None  Sodium restriction 2g  CMP, magnesium tomorrow a.m; Goal Mg > 2 and K > 4; Replete prn  HOB > 30°, Daily standing weights, Measure I/O  Will hold patient Lasix in the setting of suspected GIB      "

## 2024-10-17 NOTE — ED ATTENDING ATTESTATION
10/17/2024  I, Chace Elkins MD, saw and evaluated the patient. I have discussed the patient with the resident/non-physician practitioner and agree with the resident's/non-physician practitioner's findings, Plan of Care, and MDM as documented in the resident's/non-physician practitioner's note, except where noted. All available labs and Radiology studies were reviewed.  I was present for key portions of any procedure(s) performed by the resident/non-physician practitioner and I was immediately available to provide assistance.       At this point I agree with the current assessment done in the Emergency Department.  I have conducted an independent evaluation of this patient a history and physical is as follows:    73-year-old male referred for evaluation after having outpatient lab work with hemoglobin less than 6.  He has no complaints.  Denies any specific chest pain, shortness of breath, weakness, fatigue.  He lives in a nursing facility.  States his only complaint is that his roommate is loud.  Patient appears pale.  Vitals are normal.    ED Course  ED Course as of 10/18/24 0108   u Oct 17, 2024   1720 Hemoglobin(!): 6.3   1724 EXT Fecal Occult Blood (Ref: Negative)(!): Positive     Giving 1 unit packed red cells and admitting to medical service for further workup including GI consultation with possible inpatient colonoscopy.  Patient reports he has never had a colonoscopy that he can remember.    Critical Care Time  Procedures

## 2024-10-17 NOTE — ASSESSMENT & PLAN NOTE
Recent Labs     10/17/24  1641   HGB 6.3*   MCV 78*   Outpatient labs demonstrated hemoglobin of 5.8  Hemoglobin baseline 9-11 from 2020  BUN 20  FOBT + in ED   Patient endorses for the past month his stools have been black tarry  Home regimen PPI QD and Carafate    Hx documented of GERD, Esophagitis, Esophageal Ulcer, Barretts? No EGD/Colonoscopy found      Plan-  Monitor CBC  Transfuse if Hb < 7  Will make patient n.p.o. at midnight  Appreciate gastroenterology recommendations for possible EGD in the morning  Continue PPI IV 40 Mg BID   Folate/B12/Iron- Pending

## 2024-10-17 NOTE — H&P
"H&P - Hospitalist   Name: Gaston Ruckre 73 y.o. male I MRN: [id number]  Unit/Bed#: ED-32 I Date of Admission: 10/17/2024   Date of Service: 10/17/2024 I Hospital Day: 0     Assessment & Plan  Anemia  Recent Labs     10/17/24  1641   HGB 6.3*   MCV 78*   Outpatient labs demonstrated hemoglobin of 5.8  Hemoglobin baseline 9-11 from 2020  BUN 20  FOBT + in ED   Patient endorses for the past month his stools have been black tarry  Home regimen PPI QD and Carafate    Hx documented of GERD, Esophagitis, Esophageal Ulcer, Barretts? No EGD/Colonoscopy found      Plan-  Monitor CBC  Transfuse if Hb < 7  Will make patient n.p.o. at midnight  Appreciate gastroenterology recommendations for possible EGD in the morning  Continue PPI IV 40 Mg BID   Folate/B12/Iron- Pending   Leukocytosis  Lab Results   Component Value Date    WBC 13.88 (H) 10/17/2024    HGB 6.3 (L) 10/17/2024    HCT 23.8 (L) 10/17/2024    MCV 78 (L) 10/17/2024     (H) 10/17/2024     Likely reactive?  No infection suspected at this time  Does not meet SIRS criteria    Plan-  Will continue to monitor  History of CVA (cerebrovascular accident)  Home regimen includes aspirin and statin  No residual focal deficits on exam however patient is wheelchair-bound  Resides at [facility] for many years    Plan-  Will hold ASA in the setting of suspected GIB  Likely continue tomorrow after EGD    (HFpEF) heart failure with preserved ejection fraction (HCC)  Wt Readings from Last 3 Encounters:   08/25/24 117 kg (257 lb 4.8 oz)   05/20/24 116 kg (256 lb)   01/26/24 117 kg (258 lb)   No results found for: \"LVEF\", \"BNP\"    Patient appears euvolemic. To hypovolemic  Home regimen inlcudes Lasix 20mg QD  Last ECHO 2019 EF 60%, grade 1 diastolic dysfunction    Plan:  GDMT:  Diuretic: Lasix , B-Blocker: None, ACE/ARB/ARNi: None  Sodium restriction 2g  CMP, magnesium tomorrow a.m; Goal Mg > 2 and K > 4; Replete prn  HOB > 30°, Daily standing weights, Measure " I/O  Will hold patient Lasix in the setting of suspected GIB          VTE Pharmacologic Prophylaxis: VTE Score: 7 High Risk (Score >/= 5) - Pharmacological DVT Prophylaxis Contraindicated. Sequential Compression Devices Ordered.  Code Status: Level 1 - Full Code   Discussion with family: Patient declined call to .     Anticipated Length of Stay: Patient will be admitted on an inpatient basis with an anticipated length of stay of greater than 2 midnights secondary to GI bleed.    History of Present Illness   Chief Complaint: Outpatient labs hemoglobin 5.8, black tarry stools    Gaston Rucker is a 73 y.o. male with a PMH of CVA, bipolar, esophagitis/esophageal ulcer/Groves's?  HFpEF, who presents to the emergency room after receiving outpatient labs noting a hemoglobin of 5.8.  Patient endorses over the past month he has been having black tarry stools.  Patient denies any NSAID use, hematemesis, bright red blood per rectum, falls, fevers, chills, chest pain, shortness of breath, palpitations, abdominal pain, NVD, rectal pain, or any other symptoms at this time.    Hemodynamically stable; 90, 126/70, 96% on room air  Hemoglobin repeat in ED 6.3 with mild leukocytosis, BUN 20  FOBT positive in ED  Transfusion 1U PRBC  Admitted for workup of anemia    Review of Systems   Constitutional:  Negative for activity change, appetite change, chills, diaphoresis, fatigue, fever and unexpected weight change.   HENT:  Negative for congestion, ear pain, rhinorrhea, sore throat, trouble swallowing and voice change.    Eyes:  Negative for pain and visual disturbance.   Respiratory:  Negative for cough and shortness of breath.    Cardiovascular:  Negative for chest pain and palpitations.   Gastrointestinal:  Negative for abdominal pain, anal bleeding, blood in stool, constipation, diarrhea, nausea, rectal pain and vomiting.        Black tarry stools    Endocrine: Negative for cold intolerance, heat intolerance,  polydipsia and polyuria.   Genitourinary:  Negative for decreased urine volume, difficulty urinating, dysuria, flank pain, hematuria, penile pain and urgency.   Musculoskeletal:  Negative for arthralgias, back pain, myalgias and neck pain.        Chronic left knee pain   Skin:  Negative for color change and rash.   Neurological:  Negative for dizziness, seizures, syncope, weakness and headaches.   Hematological:  Does not bruise/bleed easily.   Psychiatric/Behavioral:  Negative for agitation and confusion.    All other systems reviewed and are negative.      Historical Information   Past Medical History:   Diagnosis Date    Anemia     Anxiety     Groves's esophagus     BPH (benign prostatic hyperplasia)     COVID-19     Depression     Esophageal ulcer     Hematemesis     HTN (hypertension) 12/05/2017    Hyperlipidemia     Hypertension     MCI (mild cognitive impairment)     Muscle weakness     TIA (transient ischemic attack)      No past surgical history on file.  Social History     Tobacco Use    Smoking status: Never    Smokeless tobacco: Never   Substance and Sexual Activity    Alcohol use: Not Currently    Drug use: No    Sexual activity: Not on file     E-Cigarette/Vaping     E-Cigarette/Vaping Substances     No family history on file.  Social History:  Marital Status: Single   Occupation: Retired  Patient Pre-hospital Living Situation: Long Term Care Facility  Patient Pre-hospital Level of Mobility: manual wheelchair  Patient Pre-hospital Diet Restrictions: None    Meds/Allergies   I have reveiwed home medications using records provided by SNF.  Prior to Admission medications    Medication Sig Start Date End Date Taking? Authorizing Provider   acetaminophen (TYLENOL) 325 mg tablet Take 650 mg by mouth every 6 (six) hours as needed   Yes Historical Provider, MD   aspirin 81 mg chewable tablet Chew 1 tablet (81 mg total) daily 4/16/19  Yes Bertha Paul MD   atorvastatin (LIPITOR) 40 mg tablet Take 1 tablet  (40 mg total) by mouth daily with dinner 7/10/23  Yes SYEDA Colunga   Cholecalciferol 25 MCG (1000 UT) tablet Take 2,000 Units by mouth daily   Yes Historical Provider, MD   divalproex sodium (DEPAKOTE SPRINKLE) 125 MG capsule Take 1 capsule (125 mg total) by mouth daily at bedtime 7/11/23  Yes SYEDA Colunga   furosemide (LASIX) 20 mg tablet Take 20 mg by mouth every morning   Yes Historical Provider, MD   multivitamin-iron-minerals-folic acid (THERAPEUTIC-M) TABS tablet Take 1 tablet by mouth daily   Yes Historical Provider, MD   pantoprazole (PROTONIX) 40 mg tablet Take 40 mg by mouth daily   Yes Historical Provider, MD   polyethylene glycol (MIRALAX) 17 g packet Take 17 g by mouth daily as needed   Yes Historical Provider, MD   sucralfate (CARAFATE) 1 g tablet Take 1 g by mouth 2 (two) times a day with meals   Yes Historical Provider, MD   tamsulosin (FLOMAX) 0.4 mg Take 0.4 mg by mouth daily with dinner   Yes Historical Provider, MD     No Known Allergies    Objective :  Temp:  [98.2 °F (36.8 °C)] 98.2 °F (36.8 °C)  HR:  [90] 90  BP: (126)/(70) 126/70  Resp:  [18] 18  SpO2:  [96 %] 96 %    Physical Exam  Vitals and nursing note reviewed.   Constitutional:       General: He is not in acute distress.     Appearance: He is well-developed. He is obese. He is not ill-appearing, toxic-appearing or diaphoretic.   HENT:      Head: Normocephalic and atraumatic.      Mouth/Throat:      Mouth: Mucous membranes are moist.   Eyes:      Extraocular Movements: Extraocular movements intact.      Conjunctiva/sclera: Conjunctivae normal.      Pupils: Pupils are equal, round, and reactive to light.   Cardiovascular:      Rate and Rhythm: Normal rate and regular rhythm.      Pulses: Normal pulses.      Heart sounds: Normal heart sounds. No murmur heard.     No gallop.   Pulmonary:      Effort: Pulmonary effort is normal. No respiratory distress.      Breath sounds: Normal breath sounds. No wheezing or rhonchi.   Chest:       Chest wall: No tenderness.      Comments: Reproducible chest pain to palpation  Abdominal:      General: Bowel sounds are normal.      Palpations: Abdomen is soft.      Tenderness: There is abdominal tenderness.      Comments: Epigastric tenderness to palpation   Musculoskeletal:         General: No swelling.      Cervical back: Normal range of motion and neck supple.   Skin:     General: Skin is warm and dry.      Capillary Refill: Capillary refill takes less than 2 seconds.      Coloration: Skin is pale.      Findings: No bruising, erythema, lesion or rash.   Neurological:      General: No focal deficit present.      Mental Status: He is alert and oriented to person, place, and time.      Cranial Nerves: No cranial nerve deficit.      Sensory: No sensory deficit.      Motor: No weakness.      Coordination: Coordination normal.   Psychiatric:         Mood and Affect: Mood normal.         Behavior: Behavior normal.         Thought Content: Thought content normal.          Lines/Drains:            Lab Results: I have reviewed the following results:  Results from last 7 days   Lab Units 10/17/24  1641   WBC Thousand/uL 13.88*   HEMOGLOBIN g/dL 6.3*   HEMATOCRIT % 23.8*   PLATELETS Thousands/uL 395*   SEGS PCT % 70   LYMPHO PCT % 17   MONO PCT % 11   EOS PCT % 1     Results from last 7 days   Lab Units 10/17/24  1641   SODIUM mmol/L 140   POTASSIUM mmol/L 4.7   CHLORIDE mmol/L 109*   CO2 mmol/L 26   BUN mg/dL 20   CREATININE mg/dL 0.93   ANION GAP mmol/L 5   CALCIUM mg/dL 8.9   ALBUMIN g/dL 3.7   TOTAL BILIRUBIN mg/dL 0.32   ALK PHOS U/L 144*   ALT U/L 11   AST U/L 37   GLUCOSE RANDOM mg/dL 112             Lab Results   Component Value Date    HGBA1C 6.2 04/12/2019    HGBA1C 6.4 (H) 12/06/2017           Imaging Results Review: No pertinent imaging studies reviewed.  Other Study Results Review: EKG was reviewed.     Administrative Statements   I have spent a total time of 35 minutes in caring for this patient on  the day of the visit/encounter including Risks and benefits of tx options, Patient and family education, Counseling / Coordination of care, Reviewing / ordering tests, medicine, procedures  , and Obtaining or reviewing history  .    ** Please Note: This note has been constructed using a voice recognition system. **

## 2024-10-17 NOTE — Clinical Note
Case was discussed with SLAVA and the patient's admission status was agreed to be Admission Status: observation status to the service of Dr. Plasencia .

## 2024-10-17 NOTE — ASSESSMENT & PLAN NOTE
Lab Results   Component Value Date    WBC 13.88 (H) 10/17/2024    HGB 6.3 (L) 10/17/2024    HCT 23.8 (L) 10/17/2024    MCV 78 (L) 10/17/2024     (H) 10/17/2024     Likely reactive?  No infection suspected at this time  Does not meet SIRS criteria    Plan-  Will continue to monitor

## 2024-10-18 ENCOUNTER — ANESTHESIA (INPATIENT)
Dept: GASTROENTEROLOGY | Facility: HOSPITAL | Age: 74
DRG: 377 | End: 2024-10-18
Payer: MEDICARE

## 2024-10-18 ENCOUNTER — APPOINTMENT (INPATIENT)
Dept: GASTROENTEROLOGY | Facility: HOSPITAL | Age: 74
DRG: 377 | End: 2024-10-18
Payer: MEDICARE

## 2024-10-18 ENCOUNTER — ANESTHESIA EVENT (INPATIENT)
Dept: GASTROENTEROLOGY | Facility: HOSPITAL | Age: 74
DRG: 377 | End: 2024-10-18
Payer: MEDICARE

## 2024-10-18 PROBLEM — K92.1 MELENA: Status: ACTIVE | Noted: 2024-10-18

## 2024-10-18 PROBLEM — R19.5 HEME POSITIVE STOOL: Status: ACTIVE | Noted: 2024-10-18

## 2024-10-18 PROBLEM — Z12.11 SCREEN FOR COLON CANCER: Status: ACTIVE | Noted: 2020-04-17

## 2024-10-18 LAB
ABO GROUP BLD BPU: NORMAL
ANION GAP SERPL CALCULATED.3IONS-SCNC: 6 MMOL/L (ref 4–13)
BPU ID: NORMAL
BUN SERPL-MCNC: 17 MG/DL (ref 5–25)
CALCIUM SERPL-MCNC: 8.3 MG/DL (ref 8.4–10.2)
CHLORIDE SERPL-SCNC: 110 MMOL/L (ref 96–108)
CO2 SERPL-SCNC: 23 MMOL/L (ref 21–32)
CREAT SERPL-MCNC: 0.9 MG/DL (ref 0.6–1.3)
CROSSMATCH: NORMAL
ERYTHROCYTE [DISTWIDTH] IN BLOOD BY AUTOMATED COUNT: 17.5 % (ref 11.6–15.1)
FERRITIN SERPL-MCNC: 9 NG/ML (ref 24–336)
FOLATE SERPL-MCNC: 18.1 NG/ML
GFR SERPL CREATININE-BSD FRML MDRD: 83 ML/MIN/1.73SQ M
GLUCOSE SERPL-MCNC: 125 MG/DL (ref 65–140)
HCT VFR BLD AUTO: 23.6 % (ref 36.5–49.3)
HCT VFR BLD AUTO: 25.3 % (ref 36.5–49.3)
HCT VFR BLD AUTO: 25.4 % (ref 36.5–49.3)
HGB BLD-MCNC: 6.6 G/DL (ref 12–17)
HGB BLD-MCNC: 7 G/DL (ref 12–17)
HGB BLD-MCNC: 7.1 G/DL (ref 12–17)
IRON SATN MFR SERPL: 5 % (ref 15–50)
IRON SERPL-MCNC: 20 UG/DL (ref 50–212)
MAGNESIUM SERPL-MCNC: 1.9 MG/DL (ref 1.9–2.7)
MCH RBC QN AUTO: 21.7 PG (ref 26.8–34.3)
MCHC RBC AUTO-ENTMCNC: 27.7 G/DL (ref 31.4–37.4)
MCV RBC AUTO: 79 FL (ref 82–98)
PLATELET # BLD AUTO: 396 THOUSANDS/UL (ref 149–390)
PMV BLD AUTO: 11 FL (ref 8.9–12.7)
POTASSIUM SERPL-SCNC: 4 MMOL/L (ref 3.5–5.3)
RBC # BLD AUTO: 3.22 MILLION/UL (ref 3.88–5.62)
SODIUM SERPL-SCNC: 139 MMOL/L (ref 135–147)
TIBC SERPL-MCNC: 392 UG/DL (ref 250–450)
UIBC SERPL-MCNC: 372 UG/DL (ref 155–355)
UNIT DISPENSE STATUS: NORMAL
UNIT PRODUCT CODE: NORMAL
UNIT PRODUCT VOLUME: 350 ML
UNIT RH: NORMAL
VIT B12 SERPL-MCNC: 474 PG/ML (ref 180–914)
WBC # BLD AUTO: 11.96 THOUSAND/UL (ref 4.31–10.16)

## 2024-10-18 PROCEDURE — 43235 EGD DIAGNOSTIC BRUSH WASH: CPT | Performed by: INTERNAL MEDICINE

## 2024-10-18 PROCEDURE — 99232 SBSQ HOSP IP/OBS MODERATE 35: CPT | Performed by: HOSPITALIST

## 2024-10-18 PROCEDURE — 85027 COMPLETE CBC AUTOMATED: CPT

## 2024-10-18 PROCEDURE — 85014 HEMATOCRIT: CPT

## 2024-10-18 PROCEDURE — 86902 BLOOD TYPE ANTIGEN DONOR EA: CPT

## 2024-10-18 PROCEDURE — 83735 ASSAY OF MAGNESIUM: CPT

## 2024-10-18 PROCEDURE — 99223 1ST HOSP IP/OBS HIGH 75: CPT | Performed by: INTERNAL MEDICINE

## 2024-10-18 PROCEDURE — 85018 HEMOGLOBIN: CPT

## 2024-10-18 PROCEDURE — 80048 BASIC METABOLIC PNL TOTAL CA: CPT

## 2024-10-18 PROCEDURE — 0DJ08ZZ INSPECTION OF UPPER INTESTINAL TRACT, VIA NATURAL OR ARTIFICIAL OPENING ENDOSCOPIC: ICD-10-PCS | Performed by: INTERNAL MEDICINE

## 2024-10-18 PROCEDURE — P9016 RBC LEUKOCYTES REDUCED: HCPCS

## 2024-10-18 RX ORDER — PROPOFOL 10 MG/ML
INJECTION, EMULSION INTRAVENOUS AS NEEDED
Status: DISCONTINUED | OUTPATIENT
Start: 2024-10-18 | End: 2024-10-18

## 2024-10-18 RX ORDER — LIDOCAINE HYDROCHLORIDE 10 MG/ML
INJECTION, SOLUTION EPIDURAL; INFILTRATION; INTRACAUDAL; PERINEURAL AS NEEDED
Status: DISCONTINUED | OUTPATIENT
Start: 2024-10-18 | End: 2024-10-18

## 2024-10-18 RX ORDER — SODIUM CHLORIDE, SODIUM LACTATE, POTASSIUM CHLORIDE, CALCIUM CHLORIDE 600; 310; 30; 20 MG/100ML; MG/100ML; MG/100ML; MG/100ML
INJECTION, SOLUTION INTRAVENOUS CONTINUOUS PRN
Status: DISCONTINUED | OUTPATIENT
Start: 2024-10-18 | End: 2024-10-18

## 2024-10-18 RX ADMIN — SUCRALFATE 1 G: 1 TABLET ORAL at 16:19

## 2024-10-18 RX ADMIN — ATORVASTATIN CALCIUM 40 MG: 40 TABLET, FILM COATED ORAL at 16:19

## 2024-10-18 RX ADMIN — PANTOPRAZOLE SODIUM 40 MG: 40 INJECTION, POWDER, FOR SOLUTION INTRAVENOUS at 08:45

## 2024-10-18 RX ADMIN — LIDOCAINE HYDROCHLORIDE 50 MG: 10 INJECTION, SOLUTION EPIDURAL; INFILTRATION; INTRACAUDAL at 13:45

## 2024-10-18 RX ADMIN — TAMSULOSIN HYDROCHLORIDE 0.4 MG: 0.4 CAPSULE ORAL at 16:19

## 2024-10-18 RX ADMIN — PROPOFOL 100 MG: 10 INJECTION, EMULSION INTRAVENOUS at 13:45

## 2024-10-18 RX ADMIN — DIVALPROEX SODIUM 125 MG: 125 CAPSULE ORAL at 22:13

## 2024-10-18 RX ADMIN — B-COMPLEX W/ C & FOLIC ACID TAB 1 TABLET: TAB at 08:45

## 2024-10-18 RX ADMIN — SODIUM CHLORIDE, SODIUM LACTATE, POTASSIUM CHLORIDE, AND CALCIUM CHLORIDE: .6; .31; .03; .02 INJECTION, SOLUTION INTRAVENOUS at 13:39

## 2024-10-18 RX ADMIN — PANTOPRAZOLE SODIUM 40 MG: 40 INJECTION, POWDER, FOR SOLUTION INTRAVENOUS at 22:13

## 2024-10-18 RX ADMIN — SUCRALFATE 1 G: 1 TABLET ORAL at 08:45

## 2024-10-18 NOTE — ANESTHESIA POSTPROCEDURE EVALUATION
Post-Op Assessment Note    CV Status:  Stable  Pain Score: 0    Pain management: adequate       Mental Status:  Arousable and sleepy   PONV Controlled:  None   Airway Patency:  Patent     Post Op Vitals Reviewed: Yes    No anethesia notable event occurred.    Staff: CRNA           Last Filed PACU Vitals:  Vitals Value Taken Time   Temp     Pulse 82    /65    Resp 16    SpO2 99        Modified Russ:  Activity: 2 (10/18/2024  1:01 PM)  Respiration: 2 (10/18/2024  1:01 PM)  Circulation: 2 (10/18/2024  1:01 PM)  Consciousness: 2 (10/18/2024  1:01 PM)  Oxygen Saturation: 2 (10/18/2024  1:01 PM)  Modified Russ Score: 10 (10/18/2024  1:01 PM)

## 2024-10-18 NOTE — ASSESSMENT & PLAN NOTE
"Wt Readings from Last 3 Encounters:   10/18/24 114 kg (251 lb 12.3 oz)   08/25/24 117 kg (257 lb 4.8 oz)   05/20/24 116 kg (256 lb)   No results found for: \"LVEF\", \"BNP\"    Patient appears euvolemic. To hypovolemic  Home regimen inlcudes Lasix 20mg QD  Last ECHO 2019 EF 60%, grade 1 diastolic dysfunction    Plan:  GDMT:  Diuretic: Lasix , B-Blocker: None, ACE/ARB/ARNi: None  Sodium restriction 2g  CMP, magnesium tomorrow a.m; Goal Mg > 2 and K > 4; Replete prn  HOB > 30°, Daily standing weights, Measure I/O  Will hold patient Lasix in the setting of suspected GIB      "

## 2024-10-18 NOTE — PLAN OF CARE
Problem: PAIN - ADULT  Goal: Verbalizes/displays adequate comfort level or baseline comfort level  Description: Interventions:  - Encourage patient to monitor pain and request assistance  - Assess pain using appropriate pain scale  - Administer analgesics based on type and severity of pain and evaluate response  - Implement non-pharmacological measures as appropriate and evaluate response  - Consider cultural and social influences on pain and pain management  - Notify physician/advanced practitioner if interventions unsuccessful or patient reports new pain  Outcome: Progressing     Problem: INFECTION - ADULT  Goal: Absence or prevention of progression during hospitalization  Description: INTERVENTIONS:  - Assess and monitor for signs and symptoms of infection  - Monitor lab/diagnostic results  - Monitor all insertion sites, i.e. indwelling lines, tubes, and drains  - Monitor endotracheal if appropriate and nasal secretions for changes in amount and color  - [place] appropriate cooling/warming therapies per order  - Administer medications as ordered  - Instruct and encourage patient and family to use good hand hygiene technique  - Identify and instruct in appropriate isolation precautions for identified infection/condition  Outcome: Progressing  Goal: Absence of fever/infection during neutropenic period  Description: INTERVENTIONS:  - Monitor WBC    Outcome: Progressing     Problem: SAFETY ADULT  Goal: Patient will remain free of falls  Description: INTERVENTIONS:  - Educate patient/family on patient safety including physical limitations  - Instruct patient to call for assistance with activity   - Consult OT/PT to assist with strengthening/mobility   - Keep Call bell within reach  - Keep bed low and locked with side rails adjusted as appropriate  - Keep care items and personal belongings within reach  - Initiate and maintain comfort rounds  - Make Fall Risk Sign visible to staff  - Offer Toileting every  Hours,  in advance of need  - Initiate/Maintain alarm  - Obtain necessary fall risk management equipment:   - Apply yellow socks and bracelet for high fall risk patients  - Consider moving patient to room near nurses station  Outcome: Progressing  Goal: Maintain or return to baseline ADL function  Description: INTERVENTIONS:  -  Assess patient's ability to carry out ADLs; assess patient's baseline for ADL function and identify physical deficits which impact ability to perform ADLs (bathing, care of mouth/teeth, toileting, grooming, dressing, etc.)  - Assess/evaluate cause of self-care deficits   - Assess range of motion  - Assess patient's mobility; develop plan if impaired  - Assess patient's need for assistive devices and provide as appropriate  - Encourage maximum independence but intervene and supervise when necessary  - Involve family in performance of ADLs  - Assess for home care needs following discharge   - Consider OT consult to assist with ADL evaluation and planning for discharge  - Provide patient education as appropriate  Outcome: Progressing  Goal: Maintains/Returns to pre admission functional level  Description: INTERVENTIONS:  - Perform AM-PAC 6 Click Basic Mobility/ Daily Activity assessment daily.  - Set and communicate daily mobility goal to care team and patient/family/caregiver.   - Collaborate with rehabilitation services on mobility goals if consulted  - Perform Range of Motion  times a day.  - Reposition patient every  hours.  - Dangle patient  times a day  - Stand patient  times a day  - Ambulate patient  times a day  - Out of bed to chair  times a day   - Out of bed for meals times a day  - Out of bed for toileting  - Record patient progress and toleration of activity level   Outcome: Progressing     Problem: DISCHARGE PLANNING  Goal: Discharge to home or other facility with appropriate resources  Description: INTERVENTIONS:  - Identify barriers to discharge w/patient and caregiver  - Arrange for  needed discharge resources and transportation as appropriate  - Identify discharge learning needs (meds, wound care, etc.)  - Arrange for interpretive services to assist at discharge as needed  - Refer to Case Management Department for coordinating discharge planning if the patient needs post-hospital services based on physician/advanced practitioner order or complex needs related to functional status, cognitive ability, or social support system  Outcome: Progressing     Problem: Knowledge Deficit  Goal: Patient/family/caregiver demonstrates understanding of disease process, treatment plan, medications, and discharge instructions  Description: Complete learning assessment and assess knowledge base.  Interventions:  - Provide teaching at level of understanding  - Provide teaching via preferred learning methods  Outcome: Progressing     Problem: Prexisting or High Potential for Compromised Skin Integrity  Goal: Skin integrity is maintained or improved  Description: INTERVENTIONS:  - Identify patients at risk for skin breakdown  - Assess and monitor skin integrity  - Assess and monitor nutrition and hydration status  - Monitor labs   - Assess for incontinence   - Turn and reposition patient  - Assist with mobility/ambulation  - Relieve pressure over bony prominences  - Avoid friction and shearing  - Provide appropriate hygiene as needed including keeping skin clean and dry  - Evaluate need for skin moisturizer/barrier cream  - Collaborate with interdisciplinary team   - Patient/family teaching  - Consider wound care consult   Outcome: Progressing

## 2024-10-18 NOTE — DISCHARGE INSTR - AVS FIRST PAGE
Dear Gaston Rucker,     It was our pleasure to care for you here at [facility].  It is our hope that we were always able to exceed the expected standards for your care during your stay.  You were hospitalized due to low hemoglobin.  You were cared for on the west floor by Konstantin Lamar MD under the service of Carlos Hernandez MD with the [facility] who covers for your primary care physician (PCP), Ru Mabry MD, while you were hospitalized.  If you have any questions or concerns related to this hospitalization, you may contact us at .  For follow up as well as any medication refills, we recommend that you follow up with your primary care physician.  A registered nurse will reach out to you by phone within a few days after your discharge to answer any additional questions that you may have after going home.  However, at this time we provide for you here, the most important instructions / recommendations at discharge:     Notable Medication Adjustments -   Please start taking pantoprazole 40 mg twice a day.  Please start taking 1 tablet of ferrous sulfate 324 mg 3 times a week.    Please continue taking all other medications as prescribed.  Testing Required after Discharge -   None  Important follow up information -   Please follow-up with gastroenterology.  Please follow-up with your primary care physician within 1 week.  Other Instructions -   Please monitor for any bleeding and if you experience bright red blood in your stool, black tarry stools or bloody vomiting please visit your nearest emergency department.   Please review this entire after visit summary as additional general instructions including medication list, appointments, activity, diet, any pertinent wound care, and other additional recommendations from your care team that may be provided for you.      Sincerely,     Konstantin Lamar MD

## 2024-10-18 NOTE — ANESTHESIA PREPROCEDURE EVALUATION
Procedure:  EGD    Severe symptomatic anemia  - Hgb 7.1 this AM s/p transfusion    CAD s/p NSTEMI 2017  HFpEF - 114kg drive weight today  EF60%, DD1,     Relevant Problems   CARDIO   (+) Coronary artery disease involving native coronary artery of native heart without angina pectoris      GI/HEPATIC   (+) Gastroesophageal reflux disease with esophagitis      /RENAL   (+) BPH (benign prostatic hyperplasia)      HEMATOLOGY   (+) Anemia      NEURO/PSYCH   (+) Depression        Physical Exam    Airway    Mallampati score: III  TM Distance: >3 FB  Neck ROM: full     Dental       Cardiovascular  Rhythm: regular, Rate: normal, Cardiovascular exam normal    Pulmonary  Pulmonary exam normal Breath sounds clear to auscultation    Other Findings        Anesthesia Plan  ASA Score- 4     Anesthesia Type- IV sedation with anesthesia with ASA Monitors.         Additional Monitors:     Airway Plan:     Comment: Discussed risks/benefits, including medication reactions, awareness, aspiration, and serious/life threatening complications. Plan to maintain native airway with IVGA, monitored with EtCO2.       Plan Factors-Exercise tolerance (METS): >4 METS.    Chart reviewed.    Patient summary reviewed.      Patient instructed to abstain from smoking on day of procedure. Patient did not smoke on day of surgery.            Induction- intravenous.    Postoperative Plan-     Perioperative Resuscitation Plan - Level 1 - Full Code.       Informed Consent- Anesthetic plan and risks discussed with patient.  I personally reviewed this patient with the CRNA. Discussed and agreed on the Anesthesia Plan with the CRNA..

## 2024-10-18 NOTE — ASSESSMENT & PLAN NOTE
Lab Results   Component Value Date    WBC 13.74 (H) 10/21/2024    HGB 8.0 (L) 10/21/2024    HCT 28.9 (L) 10/21/2024    MCV 81 (L) 10/21/2024     10/21/2024     Likely reactive?  No infection suspected at this time  Does not meet SIRS criteria

## 2024-10-18 NOTE — ASSESSMENT & PLAN NOTE
Lab Results   Component Value Date    WBC 11.96 (H) 10/18/2024    HGB 7.0 (L) 10/18/2024    HGB 7.1 (L) 10/18/2024    HCT 25.3 (L) 10/18/2024    HCT 25.4 (L) 10/18/2024    MCV 79 (L) 10/18/2024     (H) 10/18/2024     Likely reactive?  No infection suspected at this time  Does not meet SIRS criteria    Plan-  Downtrending   Will continue to monitor

## 2024-10-18 NOTE — PROGRESS NOTES
"Progress Note - Hospitalist   Name: Gaston Rucker 73 y.o. male I MRN: [id number]  Unit/Bed#: [facility] I Date of Admission: 10/17/2024   Date of Service: 10/18/2024 I Hospital Day: 1    Assessment & Plan  Anemia  Recent Labs     10/17/24  1641 10/18/24  0343 10/18/24  0446   HGB 6.3* 6.6* 7.0*  7.1*   MCV 78*  --  79*   Outpatient labs demonstrated hemoglobin of 5.8  Hemoglobin baseline 9-11 from 2020  BUN 20  FOBT + in ED   Patient endorses for the past month his stools have been black tarry  Home regimen PPI QD and Carafate    Hx documented of GERD, Esophagitis, Esophageal Ulcer, Barretts? No EGD/Colonoscopy found    S/p 1 unit pRBC  Folate, B12 wnl  Ferritin 9, TIBC 392, iron 20    Plan-  EGD scheduled for 1:15  Transfuse 1 unit pRBC  Monitor CBC  Transfuse if Hb < 7  Continue PPI IV 40 Mg BID     Leukocytosis  Lab Results   Component Value Date    WBC 11.96 (H) 10/18/2024    HGB 7.0 (L) 10/18/2024    HGB 7.1 (L) 10/18/2024    HCT 25.3 (L) 10/18/2024    HCT 25.4 (L) 10/18/2024    MCV 79 (L) 10/18/2024     (H) 10/18/2024     Likely reactive?  No infection suspected at this time  Does not meet SIRS criteria    Plan-  Downtrending   Will continue to monitor  History of CVA (cerebrovascular accident)  Home regimen includes aspirin and statin  No residual focal deficits on exam however patient is wheelchair-bound  Resides at [facility] for many years    Plan-  Will hold ASA in the setting of suspected GIB  Likely continue after EGD    (HFpEF) heart failure with preserved ejection fraction (HCC)  Wt Readings from Last 3 Encounters:   10/18/24 114 kg (251 lb 12.3 oz)   08/25/24 117 kg (257 lb 4.8 oz)   05/20/24 116 kg (256 lb)   No results found for: \"LVEF\", \"BNP\"    Patient appears euvolemic. To hypovolemic  Home regimen inlcudes Lasix 20mg QD  Last ECHO 2019 EF 60%, grade 1 diastolic dysfunction    Plan:  GDMT:  Diuretic: Lasix , B-Blocker: None, ACE/ARB/ARNi: None  Sodium restriction " 2g  CMP, magnesium tomorrow a.m; Goal Mg > 2 and K > 4; Replete prn  HOB > 30°, Daily standing weights, Measure I/O  Will hold patient Lasix in the setting of suspected GIB        VTE Pharmacologic Prophylaxis: VTE Score: 7 High Risk (Score >/= 5) - Pharmacological DVT Prophylaxis Contraindicated. Sequential Compression Devices Ordered.    Mobility:   Basic Mobility Inpatient Raw Score: 10  JH-HLM Goal: 4: Move to chair/commode  JH-HLM Achieved: 2: Bed activities/Dependent transfer  JH-HLM Goal NOT achieved. Continue with multidisciplinary rounding and encourage appropriate mobility to improve upon JH-HLM goals.    Patient Centered Rounds: I performed bedside rounds with nursing staff today.   Discussions with Specialists or Other Care Team Provider: GI    Education and Discussions with Family / Patient: Patient declined call to .     Current Length of Stay: 1 day(s)  Current Patient Status: Inpatient   Certification Statement: The patient will continue to require additional inpatient hospital stay due to dark stools  Discharge Plan: Anticipate discharge in 24-48 hrs to group home.    Code Status: Level 1 - Full Code    Subjective   Patient examined at bedside and reported no complaints. He endorses a dark bowel movement yesterday but was unsure of the time. He denies abdominal pain, nausea, vomiting and diarrhea.     Objective :  Temp:  [98 °F (36.7 °C)-98.9 °F (37.2 °C)] 98 °F (36.7 °C)  HR:  [68-90] 86  BP: (126-161)/(67-89) 141/86  Resp:  [16-18] 18  SpO2:  [86 %-100 %] 86 %    Body mass index is 44.6 kg/m².     Input and Output Summary (last 24 hours):     Intake/Output Summary (Last 24 hours) at 10/18/2024 0928  Last data filed at 10/18/2024 0152  Gross per 24 hour   Intake 830 ml   Output --   Net 830 ml       Physical Exam  Constitutional:       General: He is not in acute distress.     Appearance: He is obese.   HENT:      Head: Normocephalic and atraumatic.      Mouth/Throat:      Mouth:  Mucous membranes are moist.   Eyes:      Extraocular Movements: Extraocular movements intact.      Conjunctiva/sclera: Conjunctivae normal.   Cardiovascular:      Rate and Rhythm: Normal rate and regular rhythm.      Pulses: Normal pulses.      Heart sounds: Normal heart sounds.   Pulmonary:      Effort: Pulmonary effort is normal. No respiratory distress.      Breath sounds: Normal breath sounds.   Abdominal:      General: Bowel sounds are normal. There is no distension.      Tenderness: There is no abdominal tenderness.   Musculoskeletal:         General: Normal range of motion.      Cervical back: Normal range of motion.      Right lower leg: Edema present.      Left lower leg: Edema present.   Skin:     General: Skin is warm and dry.   Neurological:      Mental Status: He is alert and oriented to person, place, and time.   Psychiatric:         Mood and Affect: Mood normal.         Behavior: Behavior normal.           Lines/Drains:              Lab Results: I have reviewed the following results:   Results from last 7 days   Lab Units 10/18/24  0446 10/18/24  0343 10/17/24  1641   WBC Thousand/uL 11.96*  --  13.88*   HEMOGLOBIN g/dL 7.0*  7.1*   < > 6.3*   HEMATOCRIT % 25.3*  25.4*   < > 23.8*   PLATELETS Thousands/uL 396*  --  395*   SEGS PCT %  --   --  70   LYMPHO PCT %  --   --  17   MONO PCT %  --   --  11   EOS PCT %  --   --  1    < > = values in this interval not displayed.     Results from last 7 days   Lab Units 10/18/24  0446 10/17/24  1641   SODIUM mmol/L 139 140   POTASSIUM mmol/L 4.0 4.7   CHLORIDE mmol/L 110* 109*   CO2 mmol/L 23 26   BUN mg/dL 17 20   CREATININE mg/dL 0.90 0.93   ANION GAP mmol/L 6 5   CALCIUM mg/dL 8.3* 8.9   ALBUMIN g/dL  --  3.7   TOTAL BILIRUBIN mg/dL  --  0.32   ALK PHOS U/L  --  144*   ALT U/L  --  11   AST U/L  --  37   GLUCOSE RANDOM mg/dL 125 112     Results from last 7 days   Lab Units 10/17/24  1902   INR  1.17                   Recent Cultures (last 7 days):          Imaging Results Review: No pertinent imaging studies reviewed.  Other Study Results Review: No additional pertinent studies reviewed.    Last 24 Hours Medication List:     Current Facility-Administered Medications:     acetaminophen (TYLENOL) tablet 650 mg, Q6H PRN    [Held by provider] aspirin chewable tablet 81 mg, Daily    atorvastatin (LIPITOR) tablet 40 mg, Daily With Dinner    divalproex sodium (DEPAKOTE SPRINKLE) capsule 125 mg, HS    [Held by provider] furosemide (LASIX) tablet 20 mg, QAM    multivitamin stress formula tablet 1 tablet, Daily    ondansetron (ZOFRAN) injection 4 mg, Q6H PRN    pantoprazole (PROTONIX) injection 40 mg, Q12H MYRA    sucralfate (CARAFATE) tablet 1 g, BID With Meals    tamsulosin (FLOMAX) capsule 0.4 mg, Daily With Dinner    Administrative Statements   Today, Patient Was Seen By: Konstantin Lamar MD      **Please Note: This note may have been constructed using a voice recognition system.**

## 2024-10-18 NOTE — PLAN OF CARE
Problem: PAIN - ADULT  Goal: Verbalizes/displays adequate comfort level or baseline comfort level  Description: Interventions:  - Encourage patient to monitor pain and request assistance  - Assess pain using appropriate pain scale  - Administer analgesics based on type and severity of pain and evaluate response  - Implement non-pharmacological measures as appropriate and evaluate response  - Consider cultural and social influences on pain and pain management  - Notify physician/advanced practitioner if interventions unsuccessful or patient reports new pain  Outcome: Progressing     Problem: INFECTION - ADULT  Goal: Absence or prevention of progression during hospitalization  Description: INTERVENTIONS:  - Assess and monitor for signs and symptoms of infection  - Monitor lab/diagnostic results  - Monitor all insertion sites, i.e. indwelling lines, tubes, and drains  - Monitor endotracheal if appropriate and nasal secretions for changes in amount and color  - [place] appropriate cooling/warming therapies per order  - Administer medications as ordered  - Instruct and encourage patient and family to use good hand hygiene technique  - Identify and instruct in appropriate isolation precautions for identified infection/condition  Outcome: Progressing  Goal: Absence of fever/infection during neutropenic period  Description: INTERVENTIONS:  - Monitor WBC    Outcome: Progressing     Problem: DISCHARGE PLANNING  Goal: Discharge to home or other facility with appropriate resources  Description: INTERVENTIONS:  - Identify barriers to discharge w/patient and caregiver  - Arrange for needed discharge resources and transportation as appropriate  - Identify discharge learning needs (meds, wound care, etc.)  - Arrange for interpretive services to assist at discharge as needed  - Refer to Case Management Department for coordinating discharge planning if the patient needs post-hospital services based on physician/advanced  practitioner order or complex needs related to functional status, cognitive ability, or social support system  Outcome: Progressing

## 2024-10-18 NOTE — DISCHARGE SUMMARY
"Discharge Summary - Hospitalist   Name: Gaston Rucker 73 y.o. male I MRN: [id number]  Unit/Bed#: [facility] I Date of Admission: 10/17/2024   Date of Service: 10/21/2024 I Hospital Day: 4     Assessment & Plan  Anemia  Recent Labs     10/19/24  0513 10/20/24  0522 10/21/24  0429   HGB 7.5* 7.5* 8.0*   MCV 79* 80* 81*   Outpatient labs demonstrated hemoglobin of 5.8  Hemoglobin baseline 9-11 from 2020  BUN 20  FOBT + in ED   Patient endorses for the past month his stools have been black tarry  Home regimen PPI QD and Carafate    Hx documented of GERD, Esophagitis, Esophageal Ulcer, Barretts? No EGD/Colonoscopy found    S/p 1 unit pRBC  Folate, B12 wnl  Ferritin 9, TIBC 392, iron 20  EGD: grade D esophagitis, 6 cm type 1 hiatal hernia, mild erythematous mucosa with erosion in fundus of stomach   Colonoscopy: diverticulosis causing moderate luminal narrowing, medium protruding hemorrhoids     Plan-  Start PPI 40 Mg BID   Ferrous sulfate 324 mg 3 times a week    Leukocytosis  Lab Results   Component Value Date    WBC 13.74 (H) 10/21/2024    HGB 8.0 (L) 10/21/2024    HCT 28.9 (L) 10/21/2024    MCV 81 (L) 10/21/2024     10/21/2024     Likely reactive?  No infection suspected at this time  Does not meet SIRS criteria      History of CVA (cerebrovascular accident)  Home regimen includes aspirin and statin  No residual focal deficits on exam however patient is wheelchair-bound  Resides at [facility] for many years    Plan-  Continue ASA    (HFpEF) heart failure with preserved ejection fraction (HCC)  Wt Readings from Last 3 Encounters:   10/21/24 113 kg (248 lb 0.3 oz)   08/25/24 117 kg (257 lb 4.8 oz)   05/20/24 116 kg (256 lb)   No results found for: \"LVEF\", \"BNP\"    Patient appears euvolemic. To hypovolemic  Home regimen inlcudes Lasix 20mg QD  Last ECHO 2019 EF 60%, grade 1 diastolic dysfunction    Plan:  GDMT:  Diuretic: Lasix , B-Blocker: None, ACE/ARB/ARNi: None  Sodium restriction 2g  CMP, " magnesium tomorrow a.m; Goal Mg > 2 and K > 4; Replete prn  HOB > 30°, Daily standing weights, Measure I/O  Continue lasix          Medical Problems       Resolved Problems  Date Reviewed: 10/17/2024   None       Discharging Physician / Practitioner: Konstantin Lamar MD  PCP: Ru Mabry MD  Admission Date:   Admission Orders (From admission, onward)       Ordered        10/17/24 1835  INPATIENT ADMISSION  Once                          Discharge Date: 10/21/24    Consultations During Hospital Stay:  Gastroenterology    Procedures Performed:   EGD  Colonoscopy     Significant Findings / Test Results:   Colonoscopy    Result Date: 10/21/2024  Impression: Localized diverticulosis of severe severity containing stool and causing moderate luminal narrowing in the sigmoid colon Medium, protruding hemorrhoids RECOMMENDATION: No further screening colonoscopies necessary Age greater than 65  Resume regular diet Return to floor Monitor stool output, hemoglobin, transfuse as needed  Anika Brown MD     EGD    Result Date: 10/18/2024  Impression: Grade D esophagitis 6 cm type I hiatal hernia Mild erythematous mucosa with erosion in the fundus of the stomach The duodenal bulb, 1st part of the duodenum and 2nd part of the duodenum appeared normal. RECOMMENDATION: There is no recommended follow-up for this procedure. Recommend repeat EGD in 3 months to assess for healing of esophagitis and to assess for underlying Groves's esophagus. Would recommend pantoprazole 40 mg twice daily. Given the degree of anemia, would recommend colonoscopy to rule out any colorectal polyps or lesions accounting for anemia/melena. Recommend bowel prep over the weekend with plan for colonoscopy on Monday. May resume nonulcerogenic diet today.   Citlali Modi MD       No Chest XR results available for this patient.        Incidental Findings:   None    Test Results Pending at Discharge (will require follow up):   None     Outpatient Tests  Requested:  None    Complications: None     Reason for Admission: Low hemoglobin    Hospital Course:   Gaston Rucker is a 73 y.o. male patient who originally presented to the hospital on 10/17/2024 due to low hemoglobin.  In the ED patient was hemodynamically stable and repeat hemoglobin was 6.3 with mild leukocytosis to 13.88.  FOBT was positive and he was given 1 unit PRBC.  Patient was kept n.p.o. for possible EGD per gastroenterology.  Started on IV Protonix twice daily and his aspirin and Lasix were held.  GI performed endoscopy which revealed grade D esophagitis, 6 cm type 1 hiatal hernia and mild erythemqtous mucosa with erosion in fundus of stomach. Iron panel revealed iron deficiency anemia and venofer 300 mg was given for 3 days.  Gastroenterology plan for colonoscopy which revealed localized diverticulosis causing moderate luminal narrowing in the sigmoid colon and medium protruding hemorrhoids.  He was recommended to start Protonix 40 mg twice daily and continue all other medications.  He did not have any further bloody or dark, tarry bowel movements and was asymptomatic.  He was told to start taking ferrous sulfate 3 times a week.  At the time of discharge patient was medically stable.          Please see above list of diagnoses and related plan for additional information.     Condition at Discharge: stable    Discharge Day Visit / Exam:   Subjective: No acute events overnight.  Patient examined at bedside and reports feeling good.  He endorsed slight left knee pain which is chronic due to arthritis.  He had no further episodes of bloody bowel movements.  Vitals: Blood Pressure: 140/79 (10/21/24 1435)  Pulse: 66 (10/21/24 1435)  Temperature: (!) 97.3 °F (36.3 °C) (10/21/24 1435)  Temp Source: Temporal (10/21/24 1347)  Respirations: 18 (10/21/24 1435)  Weight - Scale: 113 kg (248 lb 0.3 oz) (10/21/24 0553)  SpO2: 91 % (10/21/24 1435)  Physical Exam  Constitutional:       General: He is not in  acute distress.     Appearance: He is obese.   HENT:      Head: Normocephalic and atraumatic.      Nose: No congestion.      Mouth/Throat:      Mouth: Mucous membranes are moist.   Eyes:      Extraocular Movements: Extraocular movements intact.      Conjunctiva/sclera: Conjunctivae normal.   Cardiovascular:      Rate and Rhythm: Normal rate and regular rhythm.      Pulses: Normal pulses.      Heart sounds: Normal heart sounds.   Pulmonary:      Effort: Pulmonary effort is normal. No respiratory distress.      Breath sounds: Normal breath sounds.   Abdominal:      General: Bowel sounds are normal. There is no distension.      Tenderness: There is no abdominal tenderness.   Musculoskeletal:      Cervical back: Normal range of motion.      Left knee: Decreased range of motion. Tenderness present.   Skin:     General: Skin is warm and dry.      Coloration: Skin is pale.   Neurological:      Mental Status: He is alert and oriented to person, place, and time.   Psychiatric:         Mood and Affect: Mood normal.         Behavior: Behavior normal.          Discussion with Family: Attempted to update  (friend) via phone. Left voicemail.     Discharge instructions/Information to patient and family:   See after visit summary for information provided to patient and family.      Provisions for Follow-Up Care:  See after visit summary for information related to follow-up care and any pertinent home health orders.      Mobility at time of Discharge:   Basic Mobility Inpatient Raw Score: 10  JH-HLM Goal: 4: Move to chair/commode  JH-HLM Achieved: 2: Bed activities/Dependent transfer  HLM Goal NOT achieved. Continue to encourage mobility in post discharge setting.     Disposition:   Assisted Living Facility at [facility]    Planned Readmission: No    Discharge Medications:  See after visit summary for reconciled discharge medications provided to patient and/or family.      Administrative Statements    Discharge Statement:  I have spent a total time of 30 minutes in caring for this patient on the day of the visit/encounter.    **Please Note: This note may have been constructed using a voice recognition system**

## 2024-10-18 NOTE — ASSESSMENT & PLAN NOTE
Home regimen includes aspirin and statin  No residual focal deficits on exam however patient is wheelchair-bound  Resides at [facility] for many years    Plan-  Continue ASA

## 2024-10-18 NOTE — ASSESSMENT & PLAN NOTE
Recent Labs     10/19/24  0513 10/20/24  0522 10/21/24  0429   HGB 7.5* 7.5* 8.0*   MCV 79* 80* 81*   Outpatient labs demonstrated hemoglobin of 5.8  Hemoglobin baseline 9-11 from 2020  BUN 20  FOBT + in ED   Patient endorses for the past month his stools have been black tarry  Home regimen PPI QD and Carafate    Hx documented of GERD, Esophagitis, Esophageal Ulcer, Barretts? No EGD/Colonoscopy found    S/p 1 unit pRBC  Folate, B12 wnl  Ferritin 9, TIBC 392, iron 20  EGD: grade D esophagitis, 6 cm type 1 hiatal hernia, mild erythematous mucosa with erosion in fundus of stomach   Colonoscopy: diverticulosis causing moderate luminal narrowing, medium protruding hemorrhoids     Plan-  Start PPI 40 Mg BID   Ferrous sulfate 324 mg 3 times a week

## 2024-10-18 NOTE — ASSESSMENT & PLAN NOTE
Recent Labs     10/17/24  1641 10/18/24  0343 10/18/24  0446   HGB 6.3* 6.6* 7.0*  7.1*   MCV 78*  --  79*   Outpatient labs demonstrated hemoglobin of 5.8  Hemoglobin baseline 9-11 from 2020  BUN 20  FOBT + in ED   Patient endorses for the past month his stools have been black tarry  Home regimen PPI QD and Carafate    Hx documented of GERD, Esophagitis, Esophageal Ulcer, Barretts? No EGD/Colonoscopy found    S/p 1 unit pRBC  Folate, B12 wnl  Ferritin 9, TIBC 392, iron 20    Plan-  EGD scheduled for 1:15  Transfuse 1 unit pRBC  Monitor CBC  Transfuse if Hb < 7  Continue PPI IV 40 Mg BID

## 2024-10-18 NOTE — ASSESSMENT & PLAN NOTE
"Wt Readings from Last 3 Encounters:   10/21/24 113 kg (248 lb 0.3 oz)   08/25/24 117 kg (257 lb 4.8 oz)   05/20/24 116 kg (256 lb)   No results found for: \"LVEF\", \"BNP\"    Patient appears euvolemic. To hypovolemic  Home regimen inlcudes Lasix 20mg QD  Last ECHO 2019 EF 60%, grade 1 diastolic dysfunction    Plan:  GDMT:  Diuretic: Lasix , B-Blocker: None, ACE/ARB/ARNi: None  Sodium restriction 2g  CMP, magnesium tomorrow a.m; Goal Mg > 2 and K > 4; Replete prn  HOB > 30°, Daily standing weights, Measure I/O  Continue lasix       "

## 2024-10-18 NOTE — CASE MANAGEMENT
Case Management Assessment & Discharge Planning Note    Patient name Gaston Rucker  Location W /W -01 MRN [id number]  : 1950 Date 10/18/2024       Current Admission Date: 10/17/2024  Current Admission Diagnosis:Anemia   Patient Active Problem List    Diagnosis Date Noted Date Diagnosed    Melena 10/18/2024     Heme positive stool 10/18/2024     (HFpEF) heart failure with preserved ejection fraction (HCC) 10/17/2024     Leukocytosis 10/17/2024     Bipolar 1 disorder (HCC) 2023     Bilateral lower extremity edema 2023     Expiratory wheezing 2023     History of CVA (cerebrovascular accident) 2021     Anemia 2021     BPH (benign prostatic hyperplasia) 2021     Gastroesophageal reflux disease with esophagitis 2020     Depression 2020     Screen for colon cancer 2020     COVID 04/15/2020     Basal ganglia infarction ([facility]) 2019     Knee pain 2019     Multiple wounds of skin 2019     Thalamic infarct, acute (HCC) 2019     Morbid obesity with BMI of 45.0-49.9, adult ([facility]) 2017     Multiple injuries 2017     Ambulatory dysfunction 2017     Falls frequently 2017     Prediabetes 2017     Coronary artery disease involving native coronary artery of native heart without angina pectoris 2017     Mild cognitive impairment 2017       LOS (days): 1  Geometric Mean LOS (GMLOS) (days): 2.8  Days to GMLOS:2.2     OBJECTIVE:    Risk of Unplanned Readmission Score: 8.47         Current admission status: Inpatient       Preferred Pharmacy:   PATIENT/FAMILY REPORTS NO PREFERRED PHARMACY  No address on file      Primary Care Provider: Ru Mabry MD    Primary Insurance: MEDICARE  Secondary Insurance: [facility]    ASSESSMENT:  Active Health Care Proxies       [place] Care Representative - Friend   Primary Phone: [phone] (Home)                            Readmission Root Cause  30 Day Readmission: No    Patient Information  Admitted from:: Facility  Mental Status: Alert  During Assessment patient was accompanied by: Not accompanied during assessment  Assessment information provided by:: Patient  Primary Caregiver: Other (Comment) ([facility])  Caregiver's Name:: [facility]  Caregiver's Telephone Number:: [phone]  Support Systems: Friends/neighbors, Other (Comment) (Staff at facility)  Type of Current Residence: Facility  Upon entering residence, is there a bedroom on the main floor (no further steps)?: Yes  Upon entering residence, is there a bathroom on the main floor (no further steps)?: Yes  Living Arrangements: Lives in Facility  Is patient a ?: No    Activities of Daily Living Prior to Admission  Functional Status: Total dependent  Completes ADLs independently?: No  Level of ADL dependence: Assistance  Ambulates independently?: No  Level of ambulatory dependence: Total Dependent  Does patient use assisted devices?: Yes  Assisted Devices (DME) used: Jeanine lift, Wheelchair  Does patient currently own DME?: Yes  What DME does the patient currently own?: Wheelchair  Does patient have a history of Outpatient Therapy (PT/OT)?: No  Does the patient have a history of Short-Term Rehab?: Yes  Does patient have a history of HHC?: Yes  Does patient currently have HHC?: No         Patient Information Continued  Income Source: SSI/SSD  Does patient have prescription coverage?: Yes  Does patient receive dialysis treatments?: No  Does patient have a history of substance abuse?: No         Means of Transportation  [facility] (Seen at facility)      Social Determinants of Health (SDOH)      Flowsheet Row Most Recent Value   Housing Stability    In the last 12 months, was there a time when you were not able to pay the mortgage or rent on time? N   In the past 12 months, how many times have you moved  where you were living? 0   At any time in the past 12 months, were you homeless or living in a shelter (including now)? N   Transportation Needs    In the past 12 months, has lack of transportation kept you from medical appointments or from getting medications? no   In the past 12 months, has lack of transportation kept you from meetings, work, or from getting things needed for daily living? No   Food Insecurity    Within the past 12 months, you worried that your food would run out before you got the money to buy more. Never true   Within the past 12 months, the food you bought just didn't last and you didn't have money to get more. Never true   Utilities    In the past 12 months has the electric, gas, oil, or water company threatened to shut off services in your home? No            DISCHARGE DETAILS:    Discharge planning discussed with:: Patient  Freedom of Choice: Yes  Comments - Freedom of Choice: CM met with patient at bedside. CM introduced self and CM role. Patient is LTC at [facility], is assist/dependent for ADLs, laura to , no O2, no therapy prior to admission. Patient would like to return to [facility] on discharge  CM contacted family/caregiver?: No- see comments (Patient self determinant)  Were Treatment Team discharge recommendations reviewed with patient/caregiver?: Yes  Did patient/caregiver verbalize understanding of patient care needs?: N/A- going to facility  Were patient/caregiver advised of the risks associated with not following Treatment Team discharge recommendations?: Yes                   Other Referral/Resources/Interventions Provided:  Interventions: Facility Return  Referral Comments: Return referral sent to [facility]

## 2024-10-18 NOTE — ASSESSMENT & PLAN NOTE
-Hemoglobin found to be 5.8 as outpatient in 6.3 in the ER.  He has been transfused 1 unit and currently at 7.1  -Follow hemoglobin transfuse as needed  -We have requested 1 more unit of packed red blood cells to be given prior to patient's EGD today.  I have successfully contacted Dr. Hernandez  (attending from the primary team) and patient is receiving another unit of packed red blood cells  -Agree IV pantoprazole 40 mg twice daily  -Keep patient n.p.o.  -Plan EGD for today  - I have reviewed the risks of endoscopy which include but are not limited to; anesthesia complications, injury/perforation of the bowel, infection and bleeding.  Patient given the opportunity to review the full consent form.

## 2024-10-18 NOTE — ASSESSMENT & PLAN NOTE
Home regimen includes aspirin and statin  No residual focal deficits on exam however patient is wheelchair-bound  Resides at [facility] for many years    Plan-  Will hold ASA in the setting of suspected GIB  Likely continue after EGD

## 2024-10-18 NOTE — CONSULTS
Consultation - Gastroenterology   Name: Gaston Rucker 73 y.o. male I MRN: [id number]  Unit/Bed#: HECTOR [facility] 410-01 I Date of Admission: 10/17/2024   Date of Service: 10/18/2024 I Hospital Day: 1   Inpatient consult to gastroenterology  Consult performed by: Mauricio Vance PA-C  Consult ordered by: Prabha Alejandre MD        Physician Requesting Evaluation: Carlos Hernandez MD   Reason for Evaluation / Principal Problem: Anemia melena    Assessment & Plan  Anemia  -Hemoglobin found to be 5.8 as outpatient in 6.3 in the ER.  He has been transfused 1 unit and currently at 7.1  -Follow hemoglobin transfuse as needed  -We have requested 1 more unit of packed red blood cells to be given prior to patient's EGD today.  I have successfully contacted Dr. Hernandez  (attending from the primary team) and patient is receiving another unit of packed red blood cells  -Agree IV pantoprazole 40 mg twice daily  -Keep patient n.p.o.  -Plan EGD for today  - I have reviewed the risks of endoscopy which include but are not limited to; anesthesia complications, injury/perforation of the bowel, infection and bleeding.  Patient given the opportunity to review the full consent form.    Melena  -Likely upper GI bleed  Heme positive stool  -Noted in the ER  Screen for colon cancer  - No previous colonoscopy  -Will discuss with patient for possible colonoscopy as outpatient        History of Present Illness   HPI:  Gaston Rucker is a 73 y.o. male with past medical history of anemia, anxiety/depression, heart failure with preserved ejection fraction, GERD, Groves's esophagus, BPH,, esophageal ulcer, hypertension, hyperlipidemia, mild cognitive impairment who presents to the emergency room on Thursday, October 17, 2020 for notation on outpatient labs of hemoglobin of 5.8.  Patient notes over the past month having black tarry stools.  He denies any NSAID use hematemesis bright red blood per rectum, chest pain shortness of breath  nausea or vomiting.  Hemoglobin in the ER was 6.3 and he was given 1 unit of packed red blood cells and is now up to 7.1.    Endoscopic History  EGD -patient denies history of previous EGD however has diagnosis previously of Groves's esophagus send esophageal ulcer so I feel patient is just not remembering this.  Colonoscopy -none previously      Review of Systems   Constitutional:  Negative for activity change, appetite change, chills, diaphoresis, fatigue and unexpected weight change.   HENT:  Negative for mouth sores, rhinorrhea, sore throat and trouble swallowing.    Eyes:  Negative for discharge, redness and visual disturbance.   Respiratory:  Negative for apnea, cough, choking, chest tightness and shortness of breath.    Cardiovascular:  Negative for chest pain, palpitations and leg swelling.   Gastrointestinal:  Negative for abdominal distention, abdominal pain, anal bleeding, blood in stool, constipation, diarrhea, nausea, rectal pain and vomiting.        Black tarry stools   Genitourinary:  Negative for difficulty urinating, dysuria, frequency and hematuria.   Musculoskeletal:  Positive for arthralgias. Negative for back pain, gait problem, joint swelling and myalgias.        Chronic left knee pain   Skin:  Negative for color change, pallor and rash.   Allergic/Immunologic: Negative for environmental allergies and food allergies.   Neurological:  Negative for dizziness, tremors, weakness, light-headedness, numbness and headaches.   Psychiatric/Behavioral:  Negative for agitation, behavioral problems, confusion and sleep disturbance. The patient is not nervous/anxious.          Objective :  Temp:  [98 °F (36.7 °C)-98.9 °F (37.2 °C)] 98 °F (36.7 °C)  HR:  [68-90] 86  BP: (126-161)/(67-89) 141/86  Resp:  [16-18] 18  SpO2:  [86 %-100 %] 86 %    Physical Exam  Constitutional:       General: He is in acute distress (Mild).      Appearance: Normal appearance. He is not ill-appearing.   HENT:      Head:  "Normocephalic and atraumatic.   Eyes:      General: No scleral icterus.     Conjunctiva/sclera: Conjunctivae normal.   Cardiovascular:      Rate and Rhythm: Normal rate and regular rhythm.   Pulmonary:      Effort: Pulmonary effort is normal. No respiratory distress.      Breath sounds: Normal breath sounds.   Abdominal:      General: Bowel sounds are normal. There is no distension.      Palpations: Abdomen is soft.      Tenderness: There is abdominal tenderness (Mild epigastric TTP). There is no guarding.   Skin:     General: Skin is warm and dry.   Neurological:      Mental Status: He is alert and oriented to person, place, and time.   Psychiatric:         Mood and Affect: Mood normal.         Behavior: Behavior normal.           Lab Results: I have reviewed the following results:CBC/BMP:   .     10/18/24  0446   WBC 11.96*   HGB 7.0*  7.1*   HCT 25.3*  25.4*   *   SODIUM 139   K 4.0   *   CO2 23   BUN 17   CREATININE 0.90   GLUC 125   MG 1.9    , LFTs:   .     10/17/24  1641   AST 37   ALT 11   ALB 3.7   TBILI 0.32   ALKPHOS 144*    , Lipase: No results found for: \"LIPASE\"    Imaging Results Review: No pertinent imaging studies reviewed.  Other Study Results Review: No additional pertinent studies reviewed.        Mauricio Vance PA-C  Gastroentrology    "

## 2024-10-19 LAB
ABO GROUP BLD BPU: NORMAL
BPU ID: NORMAL
CROSSMATCH: NORMAL
ERYTHROCYTE [DISTWIDTH] IN BLOOD BY AUTOMATED COUNT: 17.3 % (ref 11.6–15.1)
GLUCOSE SERPL-MCNC: 130 MG/DL (ref 65–140)
HCT VFR BLD AUTO: 26.4 % (ref 36.5–49.3)
HGB BLD-MCNC: 7.5 G/DL (ref 12–17)
MCH RBC QN AUTO: 22.5 PG (ref 26.8–34.3)
MCHC RBC AUTO-ENTMCNC: 28.4 G/DL (ref 31.4–37.4)
MCV RBC AUTO: 79 FL (ref 82–98)
MRSA NOSE QL CULT: NORMAL
PLATELET # BLD AUTO: 365 THOUSANDS/UL (ref 149–390)
PMV BLD AUTO: 11.6 FL (ref 8.9–12.7)
RBC # BLD AUTO: 3.33 MILLION/UL (ref 3.88–5.62)
UNIT DISPENSE STATUS: NORMAL
UNIT PRODUCT CODE: NORMAL
UNIT PRODUCT VOLUME: 350 ML
UNIT RH: NORMAL
WBC # BLD AUTO: 11.93 THOUSAND/UL (ref 4.31–10.16)

## 2024-10-19 PROCEDURE — 85027 COMPLETE CBC AUTOMATED: CPT

## 2024-10-19 PROCEDURE — 82948 REAGENT STRIP/BLOOD GLUCOSE: CPT

## 2024-10-19 PROCEDURE — 99232 SBSQ HOSP IP/OBS MODERATE 35: CPT | Performed by: HOSPITALIST

## 2024-10-19 RX ADMIN — ATORVASTATIN CALCIUM 40 MG: 40 TABLET, FILM COATED ORAL at 16:27

## 2024-10-19 RX ADMIN — PANTOPRAZOLE SODIUM 40 MG: 40 INJECTION, POWDER, FOR SOLUTION INTRAVENOUS at 21:06

## 2024-10-19 RX ADMIN — DIVALPROEX SODIUM 125 MG: 125 CAPSULE ORAL at 21:08

## 2024-10-19 RX ADMIN — IRON SUCROSE 300 MG: 20 INJECTION, SOLUTION INTRAVENOUS at 11:51

## 2024-10-19 RX ADMIN — SUCRALFATE 1 G: 1 TABLET ORAL at 08:48

## 2024-10-19 RX ADMIN — TAMSULOSIN HYDROCHLORIDE 0.4 MG: 0.4 CAPSULE ORAL at 16:27

## 2024-10-19 RX ADMIN — SUCRALFATE 1 G: 1 TABLET ORAL at 16:27

## 2024-10-19 RX ADMIN — B-COMPLEX W/ C & FOLIC ACID TAB 1 TABLET: TAB at 08:48

## 2024-10-19 RX ADMIN — PANTOPRAZOLE SODIUM 40 MG: 40 INJECTION, POWDER, FOR SOLUTION INTRAVENOUS at 08:48

## 2024-10-19 NOTE — PROGRESS NOTES
"Progress Note - Hospitalist   Name: Gaston Rucker 73 y.o. male I MRN: [id number]  Unit/Bed#: [facility] I Date of Admission: 10/17/2024   Date of Service: 10/19/2024 I Hospital Day: 2    Assessment & Plan  Anemia  Recent Labs     10/17/24  1641 10/18/24  0343 10/18/24  0446 10/19/24  0513   HGB 6.3* 6.6* 7.0*  7.1* 7.5*   MCV 78*  --  79* 79*   Outpatient labs demonstrated hemoglobin of 5.8  Hemoglobin baseline 9-11 from 2020  BUN 20  FOBT + in ED   Patient endorses for the past month his stools have been black tarry  Home regimen PPI QD and Carafate    Hx documented of GERD, Esophagitis, Esophageal Ulcer, Barretts? No EGD/Colonoscopy found    Folate, B12 wnl  Ferritin 9, TIBC 392, iron 20  S/p 2 unit pRBC  EGD: grade D esophagitis, centimeter type I hiatal hernia, mild erythematous mucosa with erosion in the fundus of the stomach    Plan-  Colonoscopy on Monday  IV Venofer 300 mg  Monitor CBC  Transfuse if Hb < 7  Continue PPI IV 40 Mg BID     Leukocytosis  Lab Results   Component Value Date    WBC 11.93 (H) 10/19/2024    HGB 7.5 (L) 10/19/2024    HCT 26.4 (L) 10/19/2024    MCV 79 (L) 10/19/2024     10/19/2024     Likely reactive?  No infection suspected at this time  Does not meet SIRS criteria    Plan-  Downtrending   Will continue to monitor  History of CVA (cerebrovascular accident)  Home regimen includes aspirin and statin  No residual focal deficits on exam however patient is wheelchair-bound  Resides at [facility] for many years    Plan-  Will hold ASA in the setting of suspected GIB      (HFpEF) heart failure with preserved ejection fraction (HCC)  Wt Readings from Last 3 Encounters:   10/19/24 112 kg (246 lb 14.6 oz)   08/25/24 117 kg (257 lb 4.8 oz)   05/20/24 116 kg (256 lb)   No results found for: \"LVEF\", \"BNP\"    Patient appears euvolemic. To hypovolemic  Home regimen inlcudes Lasix 20mg QD  Last ECHO 2019 EF 60%, grade 1 diastolic dysfunction    Plan:  GDMT:  Diuretic: Lasix " , B-Blocker: None, ACE/ARB/ARNi: None  Sodium restriction 2g  CMP, magnesium tomorrow a.m; Goal Mg > 2 and K > 4; Replete prn  HOB > 30°, Daily standing weights, Measure I/O  Will hold patient Lasix in the setting of suspected GIB        VTE Pharmacologic Prophylaxis: VTE Score: 7 High Risk (Score >/= 5) - Pharmacological DVT Prophylaxis Contraindicated. Sequential Compression Devices Ordered.    Mobility:   Basic Mobility Inpatient Raw Score: 10  -HL Goal: 4: Move to chair/commode  JH-HLM Achieved: 3: Sit at edge of bed  JH-HLM Goal NOT achieved. Continue with multidisciplinary rounding and encourage appropriate mobility to improve upon JH-HLM goals.    Patient Centered Rounds: I performed bedside rounds with nursing staff today.   Discussions with Specialists or Other Care Team Provider: GI    Education and Discussions with Family / Patient: Patient declined call to .     Current Length of Stay: 2 day(s)  Current Patient Status: Inpatient   Certification Statement: The patient will continue to require additional inpatient hospital stay due to dark stools  Discharge Plan: Anticipate discharge in 48-72 hrs to group home.    Code Status: Level 1 - Full Code    Subjective   No acute events overnight.  Patient examined at bedside and reported no complaints.  Patient had a brown, formed bowel movement last night.  He denies abdominal pain, nausea, vomiting and diarrhea.     Objective :  Temp:  [97.2 °F (36.2 °C)-99.3 °F (37.4 °C)] 99.3 °F (37.4 °C)  HR:  [64-92] 92  BP: ()/(58-86) 114/66  Resp:  [16-22] 18  SpO2:  [88 %-100 %] 91 %  O2 Device: None (Room air)    Body mass index is 43.74 kg/m².     Input and Output Summary (last 24 hours):     Intake/Output Summary (Last 24 hours) at 10/19/2024 0930  Last data filed at 10/19/2024 0700  Gross per 24 hour   Intake 894 ml   Output --   Net 894 ml       Physical Exam  Constitutional:       General: He is not in acute distress.     Appearance: He is  obese.   HENT:      Head: Normocephalic and atraumatic.      Mouth/Throat:      Mouth: Mucous membranes are moist.   Eyes:      Extraocular Movements: Extraocular movements intact.      Conjunctiva/sclera: Conjunctivae normal.   Cardiovascular:      Rate and Rhythm: Normal rate and regular rhythm.      Pulses: Normal pulses.      Heart sounds: Normal heart sounds.   Pulmonary:      Effort: Pulmonary effort is normal. No respiratory distress.      Breath sounds: Normal breath sounds.   Abdominal:      General: Bowel sounds are normal. There is no distension.      Tenderness: There is no abdominal tenderness.   Musculoskeletal:         General: Normal range of motion.      Cervical back: Normal range of motion.      Right lower leg: Edema present.      Left lower leg: Edema present.   Skin:     General: Skin is warm and dry.   Neurological:      Mental Status: He is alert and oriented to person, place, and time.   Psychiatric:         Mood and Affect: Mood normal.         Behavior: Behavior normal.           Lines/Drains:              Lab Results: I have reviewed the following results:   Results from last 7 days   Lab Units 10/19/24  0513 10/18/24  0343 10/17/24  1641   WBC Thousand/uL 11.93*   < > 13.88*   HEMOGLOBIN g/dL 7.5*   < > 6.3*   HEMATOCRIT % 26.4*   < > 23.8*   PLATELETS Thousands/uL 365   < > 395*   SEGS PCT %  --   --  70   LYMPHO PCT %  --   --  17   MONO PCT %  --   --  11   EOS PCT %  --   --  1    < > = values in this interval not displayed.     Results from last 7 days   Lab Units 10/18/24  0446 10/17/24  1641   SODIUM mmol/L 139 140   POTASSIUM mmol/L 4.0 4.7   CHLORIDE mmol/L 110* 109*   CO2 mmol/L 23 26   BUN mg/dL 17 20   CREATININE mg/dL 0.90 0.93   ANION GAP mmol/L 6 5   CALCIUM mg/dL 8.3* 8.9   ALBUMIN g/dL  --  3.7   TOTAL BILIRUBIN mg/dL  --  0.32   ALK PHOS U/L  --  144*   ALT U/L  --  11   AST U/L  --  37   GLUCOSE RANDOM mg/dL 125 112     Results from last 7 days   Lab Units  10/17/24  1902   INR  1.17     Results from last 7 days   Lab Units 10/19/24  0721   POC GLUCOSE mg/dl 130               Recent Cultures (last 7 days):         Imaging Results Review: No pertinent imaging studies reviewed.  Other Study Results Review: No additional pertinent studies reviewed.    Last 24 Hours Medication List:     Current Facility-Administered Medications:     acetaminophen (TYLENOL) tablet 650 mg, Q6H PRN    [Held by provider] aspirin chewable tablet 81 mg, Daily    atorvastatin (LIPITOR) tablet 40 mg, Daily With Dinner    divalproex sodium (DEPAKOTE SPRINKLE) capsule 125 mg, HS    [Held by provider] furosemide (LASIX) tablet 20 mg, QAM    multivitamin stress formula tablet 1 tablet, Daily    ondansetron (ZOFRAN) injection 4 mg, Q6H PRN    pantoprazole (PROTONIX) injection 40 mg, Q12H MYRA    polyethylene glycol (GOLYTELY) bowel prep 4,000 mL, Once    sucralfate (CARAFATE) tablet 1 g, BID With Meals    tamsulosin (FLOMAX) capsule 0.4 mg, Daily With Dinner    Administrative Statements   Today, Patient Was Seen By: Konstantin Lamar MD      **Please Note: This note may have been constructed using a voice recognition system.**   91

## 2024-10-19 NOTE — ANESTHESIA POSTPROCEDURE EVALUATION
Post-Op Assessment Note    CV Status:  Stable  Pain Score: 0    Pain management: adequate       Mental Status:  Awake and sleepy   Hydration Status:  Stable   PONV Controlled:  None   Airway Patency:  Patent     Post Op Vitals Reviewed: Yes    No anethesia notable event occurred.    Staff: Anesthesiologist           Last Filed PACU Vitals:  Vitals Value Taken Time   Temp 97.8 °F (36.6 °C) 10/18/24 1354   Pulse 74 10/18/24 1400   /69 10/18/24 1400   Resp 18 10/18/24 1400   SpO2 96 % 10/18/24 1400       Modified Russ:  Activity: 2 (10/18/2024  2:00 PM)  Respiration: 2 (10/18/2024  2:00 PM)  Circulation: 2 (10/18/2024  2:00 PM)  Consciousness: 1 (10/18/2024  2:00 PM)  Oxygen Saturation: 2 (10/18/2024  2:00 PM)  Modified Russ Score: 9 (10/18/2024  2:00 PM)

## 2024-10-19 NOTE — ASSESSMENT & PLAN NOTE
Recent Labs     10/17/24  1641 10/18/24  0343 10/18/24  0446 10/19/24  0513   HGB 6.3* 6.6* 7.0*  7.1* 7.5*   MCV 78*  --  79* 79*   Outpatient labs demonstrated hemoglobin of 5.8  Hemoglobin baseline 9-11 from 2020  BUN 20  FOBT + in ED   Patient endorses for the past month his stools have been black tarry  Home regimen PPI QD and Carafate    Hx documented of GERD, Esophagitis, Esophageal Ulcer, Barretts? No EGD/Colonoscopy found    Folate, B12 wnl  Ferritin 9, TIBC 392, iron 20  S/p 2 unit pRBC  EGD: grade D esophagitis, centimeter type I hiatal hernia, mild erythematous mucosa with erosion in the fundus of the stomach    Plan-  Colonoscopy on Monday  IV Venofer 300 mg  Monitor CBC  Transfuse if Hb < 7  Continue PPI IV 40 Mg BID

## 2024-10-19 NOTE — PLAN OF CARE
Problem: PAIN - ADULT  Goal: Verbalizes/displays adequate comfort level or baseline comfort level  Description: Interventions:  - Encourage patient to monitor pain and request assistance  - Assess pain using appropriate pain scale  - Administer analgesics based on type and severity of pain and evaluate response  - Implement non-pharmacological measures as appropriate and evaluate response  - Consider cultural and social influences on pain and pain management  - Notify physician/advanced practitioner if interventions unsuccessful or patient reports new pain  Outcome: Progressing     Problem: INFECTION - ADULT  Goal: Absence or prevention of progression during hospitalization  Description: INTERVENTIONS:  - Assess and monitor for signs and symptoms of infection  - Monitor lab/diagnostic results  - Monitor all insertion sites, i.e. indwelling lines, tubes, and drains  - Monitor endotracheal if appropriate and nasal secretions for changes in amount and color  - [place] appropriate cooling/warming therapies per order  - Administer medications as ordered  - Instruct and encourage patient and family to use good hand hygiene technique  - Identify and instruct in appropriate isolation precautions for identified infection/condition  Outcome: Progressing  Goal: Absence of fever/infection during neutropenic period  Description: INTERVENTIONS:  - Monitor WBC    Outcome: Progressing     Problem: SAFETY ADULT  Goal: Patient will remain free of falls  Description: INTERVENTIONS:  - Educate patient/family on patient safety including physical limitations  - Instruct patient to call for assistance with activity   - Consult OT/PT to assist with strengthening/mobility   - Keep Call bell within reach  - Keep bed low and locked with side rails adjusted as appropriate  - Keep care items and personal belongings within reach  - Initiate and maintain comfort rounds  - Make Fall Risk Sign visible to staff  - Apply yellow socks and bracelet  for high fall risk patients  - Consider moving patient to room near nurses station  Outcome: Progressing  Goal: Maintain or return to baseline ADL function  Description: INTERVENTIONS:  -  Assess patient's ability to carry out ADLs; assess patient's baseline for ADL function and identify physical deficits which impact ability to perform ADLs (bathing, care of mouth/teeth, toileting, grooming, dressing, etc.)  - Assess/evaluate cause of self-care deficits   - Assess range of motion  - Assess patient's mobility; develop plan if impaired  - Assess patient's need for assistive devices and provide as appropriate  - Encourage maximum independence but intervene and supervise when necessary  - Involve family in performance of ADLs  - Assess for home care needs following discharge   - Consider OT consult to assist with ADL evaluation and planning for discharge  - Provide patient education as appropriate  Outcome: Progressing  Goal: Maintains/Returns to pre admission functional level  Description: INTERVENTIONS:  - Perform AM-PAC 6 Click Basic Mobility/ Daily Activity assessment daily.  - Set and communicate daily mobility goal to care team and patient/family/caregiver.   - Collaborate with rehabilitation services on mobility goals if consulted  - Out of bed for toileting  - Record patient progress and toleration of activity level   Outcome: Progressing     Problem: DISCHARGE PLANNING  Goal: Discharge to home or other facility with appropriate resources  Description: INTERVENTIONS:  - Identify barriers to discharge w/patient and caregiver  - Arrange for needed discharge resources and transportation as appropriate  - Identify discharge learning needs (meds, wound care, etc.)  - Arrange for interpretive services to assist at discharge as needed  - Refer to Case Management Department for coordinating discharge planning if the patient needs post-hospital services based on physician/advanced practitioner order or complex needs  related to functional status, cognitive ability, or social support system  Outcome: Progressing     Problem: Knowledge Deficit  Goal: Patient/family/caregiver demonstrates understanding of disease process, treatment plan, medications, and discharge instructions  Description: Complete learning assessment and assess knowledge base.  Interventions:  - Provide teaching at level of understanding  - Provide teaching via preferred learning methods  Outcome: Progressing     Problem: Prexisting or High Potential for Compromised Skin Integrity  Goal: Skin integrity is maintained or improved  Description: INTERVENTIONS:  - Identify patients at risk for skin breakdown  - Assess and monitor skin integrity  - Assess and monitor nutrition and hydration status  - Monitor labs   - Assess for incontinence   - Turn and reposition patient  - Assist with mobility/ambulation  - Relieve pressure over bony prominences  - Avoid friction and shearing  - Provide appropriate hygiene as needed including keeping skin clean and dry  - Evaluate need for skin moisturizer/barrier cream  - Collaborate with interdisciplinary team   - Patient/family teaching  - Consider wound care consult   Outcome: Progressing

## 2024-10-19 NOTE — ASSESSMENT & PLAN NOTE
"Wt Readings from Last 3 Encounters:   10/19/24 112 kg (246 lb 14.6 oz)   08/25/24 117 kg (257 lb 4.8 oz)   05/20/24 116 kg (256 lb)   No results found for: \"LVEF\", \"BNP\"    Patient appears euvolemic. To hypovolemic  Home regimen inlcudes Lasix 20mg QD  Last ECHO 2019 EF 60%, grade 1 diastolic dysfunction    Plan:  GDMT:  Diuretic: Lasix , B-Blocker: None, ACE/ARB/ARNi: None  Sodium restriction 2g  CMP, magnesium tomorrow a.m; Goal Mg > 2 and K > 4; Replete prn  HOB > 30°, Daily standing weights, Measure I/O  Will hold patient Lasix in the setting of suspected GIB      "

## 2024-10-19 NOTE — ASSESSMENT & PLAN NOTE
Home regimen includes aspirin and statin  No residual focal deficits on exam however patient is wheelchair-bound  Resides at [facility] for many years    Plan-  Will hold ASA in the setting of suspected GIB

## 2024-10-19 NOTE — ASSESSMENT & PLAN NOTE
Lab Results   Component Value Date    WBC 11.93 (H) 10/19/2024    HGB 7.5 (L) 10/19/2024    HCT 26.4 (L) 10/19/2024    MCV 79 (L) 10/19/2024     10/19/2024     Likely reactive?  No infection suspected at this time  Does not meet SIRS criteria    Plan-  Downtrending   Will continue to monitor

## 2024-10-20 LAB
ERYTHROCYTE [DISTWIDTH] IN BLOOD BY AUTOMATED COUNT: 17.7 % (ref 11.6–15.1)
HCT VFR BLD AUTO: 26.6 % (ref 36.5–49.3)
HGB BLD-MCNC: 7.5 G/DL (ref 12–17)
MCH RBC QN AUTO: 22.5 PG (ref 26.8–34.3)
MCHC RBC AUTO-ENTMCNC: 28.2 G/DL (ref 31.4–37.4)
MCV RBC AUTO: 80 FL (ref 82–98)
PLATELET # BLD AUTO: 365 THOUSANDS/UL (ref 149–390)
PMV BLD AUTO: 11.5 FL (ref 8.9–12.7)
RBC # BLD AUTO: 3.33 MILLION/UL (ref 3.88–5.62)
WBC # BLD AUTO: 13.35 THOUSAND/UL (ref 4.31–10.16)

## 2024-10-20 PROCEDURE — 85027 COMPLETE CBC AUTOMATED: CPT

## 2024-10-20 PROCEDURE — 99232 SBSQ HOSP IP/OBS MODERATE 35: CPT | Performed by: HOSPITALIST

## 2024-10-20 RX ADMIN — PANTOPRAZOLE SODIUM 40 MG: 40 INJECTION, POWDER, FOR SOLUTION INTRAVENOUS at 22:13

## 2024-10-20 RX ADMIN — B-COMPLEX W/ C & FOLIC ACID TAB 1 TABLET: TAB at 09:17

## 2024-10-20 RX ADMIN — DIVALPROEX SODIUM 125 MG: 125 CAPSULE ORAL at 22:14

## 2024-10-20 RX ADMIN — SUCRALFATE 1 G: 1 TABLET ORAL at 15:52

## 2024-10-20 RX ADMIN — IRON SUCROSE 300 MG: 20 INJECTION, SOLUTION INTRAVENOUS at 10:30

## 2024-10-20 RX ADMIN — PANTOPRAZOLE SODIUM 40 MG: 40 INJECTION, POWDER, FOR SOLUTION INTRAVENOUS at 09:21

## 2024-10-20 RX ADMIN — ATORVASTATIN CALCIUM 40 MG: 40 TABLET, FILM COATED ORAL at 15:52

## 2024-10-20 RX ADMIN — SUCRALFATE 1 G: 1 TABLET ORAL at 09:21

## 2024-10-20 NOTE — ASSESSMENT & PLAN NOTE
Recent Labs     10/18/24  0446 10/19/24  0513 10/20/24  0522   HGB 7.0*  7.1* 7.5* 7.5*   MCV 79* 79* 80*   Outpatient labs demonstrated hemoglobin of 5.8  Hemoglobin baseline 9-11 from 2020  BUN 20  FOBT + in ED   Patient endorses for the past month his stools have been black tarry  Home regimen PPI QD and Carafate    Hx documented of GERD, Esophagitis, Esophageal Ulcer, Barretts? No EGD/Colonoscopy found    Folate, B12 wnl  Ferritin 9, TIBC 392, iron 20  S/p 2 unit pRBC  EGD: grade D esophagitis, centimeter type I hiatal hernia, mild erythematous mucosa with erosion in the fundus of the stomach    Plan-  Colonoscopy on Monday  IV Venofer 300 mg, Day 2   Monitor CBC  Transfuse if Hb < 7  Continue PPI IV 40 Mg BID

## 2024-10-20 NOTE — PROGRESS NOTES
"Progress Note - Hospitalist   Name: Gaston Rucker 73 y.o. male I MRN: [id number]  Unit/Bed#: [facility] I Date of Admission: 10/17/2024   Date of Service: 10/20/2024 I Hospital Day: 3    Assessment & Plan  Anemia  Recent Labs     10/18/24  0446 10/19/24  0513 10/20/24  0522   HGB 7.0*  7.1* 7.5* 7.5*   MCV 79* 79* 80*   Outpatient labs demonstrated hemoglobin of 5.8  Hemoglobin baseline 9-11 from 2020  BUN 20  FOBT + in ED   Patient endorses for the past month his stools have been black tarry  Home regimen PPI QD and Carafate    Hx documented of GERD, Esophagitis, Esophageal Ulcer, Barretts? No EGD/Colonoscopy found    Folate, B12 wnl  Ferritin 9, TIBC 392, iron 20  S/p 2 unit pRBC  EGD: grade D esophagitis, centimeter type I hiatal hernia, mild erythematous mucosa with erosion in the fundus of the stomach    Plan-  Colonoscopy on Monday  IV Venofer 300 mg, Day 2   Monitor CBC  Transfuse if Hb < 7  Continue PPI IV 40 Mg BID     Leukocytosis  Lab Results   Component Value Date    WBC 13.35 (H) 10/20/2024    HGB 7.5 (L) 10/20/2024    HCT 26.6 (L) 10/20/2024    MCV 80 (L) 10/20/2024     10/20/2024     Likely reactive?  No infection suspected at this time  Does not meet SIRS criteria    Plan-  Will continue to monitor  History of CVA (cerebrovascular accident)  Home regimen includes aspirin and statin  No residual focal deficits on exam however patient is wheelchair-bound  Resides at [facility] for many years    Plan-  Will hold ASA in the setting of suspected GIB      (HFpEF) heart failure with preserved ejection fraction (HCC)  Wt Readings from Last 3 Encounters:   10/20/24 113 kg (249 lb 5.4 oz)   08/25/24 117 kg (257 lb 4.8 oz)   05/20/24 116 kg (256 lb)   No results found for: \"LVEF\", \"BNP\"    Patient appears euvolemic. To hypovolemic  Home regimen inlcudes Lasix 20mg QD  Last ECHO 2019 EF 60%, grade 1 diastolic dysfunction    Plan:  GDMT:  Diuretic: Lasix , B-Blocker: None, ACE/ARB/ARNi: " None  Sodium restriction 2g  CMP, magnesium tomorrow a.m; Goal Mg > 2 and K > 4; Replete prn  HOB > 30°, Daily standing weights, Measure I/O  Will hold patient Lasix in the setting of suspected GIB        VTE Pharmacologic Prophylaxis: VTE Score: 7 High Risk (Score >/= 5) - Pharmacological DVT Prophylaxis Ordered: held in setting of GIB . Sequential Compression Devices Ordered.    Mobility:   Basic Mobility Inpatient Raw Score: 10  JH-HL Goal: 4: Move to chair/commode  JH-HLM Achieved: 3: Sit at edge of bed  JH-HLM Goal achieved. Continue to encourage appropriate mobility.    Patient Centered Rounds: I performed bedside rounds with nursing staff today.   Discussions with Specialists or Other Care Team Provider: GIB     Education and Discussions with Family / Patient: Patient declined call to .     Current Length of Stay: 3 day(s)  Current Patient Status: Inpatient   Certification Statement: The patient will continue to require additional inpatient hospital stay due to GIB, Colonoscopy   Discharge Plan: Anticipate discharge in 24-48 hrs to prior assisted or independent living facility.    Code Status: Level 1 - Full Code    Subjective   Patient was seen and examined at the bedside.  Patient is resting comfortably in no overt acute distress.  Patient endorses he feels well and has no complaints at the time my evaluation.  Patient denied fever, chills, cough, congestion, sore throat, chest pain, shortness of breath, palpitations, abdominal pain, NVD, urinary symptoms, leg pain, or any other symptoms at this time.    Objective :  Temp:  [97.8 °F (36.6 °C)-98.9 °F (37.2 °C)] 97.8 °F (36.6 °C)  HR:  [89] 89  BP: (121-135)/(65-79) 135/79  Resp:  [18-24] 18  SpO2:  [95 %] 95 %    Body mass index is 44.17 kg/m².     Input and Output Summary (last 24 hours):     Intake/Output Summary (Last 24 hours) at 10/20/2024 0934  Last data filed at 10/19/2024 1300  Gross per 24 hour   Intake 480 ml   Output --   Net 480         Physical Exam  Vitals and nursing note reviewed.   Constitutional:       General: He is not in acute distress.     Appearance: He is well-developed. He is obese. He is not ill-appearing, toxic-appearing or diaphoretic.   HENT:      Head: Normocephalic and atraumatic.      Mouth/Throat:      Mouth: Mucous membranes are moist.   Eyes:      Extraocular Movements: Extraocular movements intact.      Conjunctiva/sclera: Conjunctivae normal.      Pupils: Pupils are equal, round, and reactive to light.   Cardiovascular:      Rate and Rhythm: Normal rate and regular rhythm.      Pulses: Normal pulses.      Heart sounds: Normal heart sounds. No murmur heard.     No friction rub. No gallop.   Pulmonary:      Effort: Pulmonary effort is normal. No respiratory distress.      Breath sounds: Normal breath sounds. No wheezing or rhonchi.   Chest:      Chest wall: No tenderness.   Abdominal:      General: Bowel sounds are normal. There is no distension.      Palpations: Abdomen is soft.      Tenderness: There is no abdominal tenderness.   Musculoskeletal:         General: No swelling.      Cervical back: Normal range of motion and neck supple.   Skin:     General: Skin is warm and dry.      Capillary Refill: Capillary refill takes less than 2 seconds.      Coloration: Skin is pale.   Neurological:      General: No focal deficit present.      Mental Status: He is alert. Mental status is at baseline.      Cranial Nerves: No cranial nerve deficit.      Sensory: No sensory deficit.      Motor: No weakness.      Coordination: Coordination normal.   Psychiatric:         Mood and Affect: Mood normal.         Behavior: Behavior normal.         Thought Content: Thought content normal.           Lines/Drains:          Lab Results: I have reviewed the following results:   Results from last 7 days   Lab Units 10/20/24  0522 10/18/24  0343 10/17/24  1641   WBC Thousand/uL 13.35*   < > 13.88*   HEMOGLOBIN g/dL 7.5*   < > 6.3*    HEMATOCRIT % 26.6*   < > 23.8*   PLATELETS Thousands/uL 365   < > 395*   SEGS PCT %  --   --  70   LYMPHO PCT %  --   --  17   MONO PCT %  --   --  11   EOS PCT %  --   --  1    < > = values in this interval not displayed.     Results from last 7 days   Lab Units 10/18/24  0446 10/17/24  1641   SODIUM mmol/L 139 140   POTASSIUM mmol/L 4.0 4.7   CHLORIDE mmol/L 110* 109*   CO2 mmol/L 23 26   BUN mg/dL 17 20   CREATININE mg/dL 0.90 0.93   ANION GAP mmol/L 6 5   CALCIUM mg/dL 8.3* 8.9   ALBUMIN g/dL  --  3.7   TOTAL BILIRUBIN mg/dL  --  0.32   ALK PHOS U/L  --  144*   ALT U/L  --  11   AST U/L  --  37   GLUCOSE RANDOM mg/dL 125 112     Results from last 7 days   Lab Units 10/17/24  1902   INR  1.17     Results from last 7 days   Lab Units 10/19/24  0721   POC GLUCOSE mg/dl 130               Recent Cultures (last 7 days):         Imaging Results Review: No pertinent imaging studies reviewed. Procedure report reviewed   Other Study Results Review: EKG was reviewed.     Last 24 Hours Medication List:     Current Facility-Administered Medications:     acetaminophen (TYLENOL) tablet 650 mg, Q6H PRN    [Held by provider] aspirin chewable tablet 81 mg, Daily    atorvastatin (LIPITOR) tablet 40 mg, Daily With Dinner    divalproex sodium (DEPAKOTE SPRINKLE) capsule 125 mg, HS    [Held by provider] furosemide (LASIX) tablet 20 mg, QAM    iron sucrose (VENOFER) 300 mg in sodium chloride 0.9 % 250 mL IVPB, Daily, Last Rate: 300 mg (10/19/24 1151)    multivitamin stress formula tablet 1 tablet, Daily    ondansetron (ZOFRAN) injection 4 mg, Q6H PRN    pantoprazole (PROTONIX) injection 40 mg, Q12H MYRA    polyethylene glycol (GOLYTELY) bowel prep 4,000 mL, Once    sucralfate (CARAFATE) tablet 1 g, BID With Meals    tamsulosin (FLOMAX) capsule 0.4 mg, Daily With Dinner    Administrative Statements   Today, Patient Was Seen By: Prabha Alejandre MD  I have spent a total time of 35 minutes in caring for this patient on the day of the  visit/encounter including Diagnostic results, Instructions for management, Documenting in the medical record, Reviewing / ordering tests, medicine, procedures  , and Communicating with other healthcare professionals .    **Please Note: This note may have been constructed using a voice recognition system.**

## 2024-10-20 NOTE — ASSESSMENT & PLAN NOTE
Home regimen includes aspirin and statin  No residual focal deficits on exam however patient is wheelchair-bound  Resides at [facility] for many years    Plan-  Will hold ASA in the setting of suspected GIB

## 2024-10-20 NOTE — ASSESSMENT & PLAN NOTE
Lab Results   Component Value Date    WBC 13.35 (H) 10/20/2024    HGB 7.5 (L) 10/20/2024    HCT 26.6 (L) 10/20/2024    MCV 80 (L) 10/20/2024     10/20/2024     Likely reactive?  No infection suspected at this time  Does not meet SIRS criteria    Plan-  Will continue to monitor

## 2024-10-20 NOTE — PLAN OF CARE
Problem: PAIN - ADULT  Goal: Verbalizes/displays adequate comfort level or baseline comfort level  Description: Interventions:  - Encourage patient to monitor pain and request assistance  - Assess pain using appropriate pain scale  - Administer analgesics based on type and severity of pain and evaluate response  - Implement non-pharmacological measures as appropriate and evaluate response  - Consider cultural and social influences on pain and pain management  - Notify physician/advanced practitioner if interventions unsuccessful or patient reports new pain  Outcome: Progressing     Problem: INFECTION - ADULT  Goal: Absence or prevention of progression during hospitalization  Description: INTERVENTIONS:  - Assess and monitor for signs and symptoms of infection  - Monitor lab/diagnostic results  - Monitor all insertion sites, i.e. indwelling lines, tubes, and drains  - Monitor endotracheal if appropriate and nasal secretions for changes in amount and color  - [place] appropriate cooling/warming therapies per order  - Administer medications as ordered  - Instruct and encourage patient and family to use good hand hygiene technique  - Identify and instruct in appropriate isolation precautions for identified infection/condition  Outcome: Progressing  Goal: Absence of fever/infection during neutropenic period  Description: INTERVENTIONS:  - Monitor WBC    Outcome: Progressing     Problem: SAFETY ADULT  Goal: Patient will remain free of falls  Description: INTERVENTIONS:  - Educate patient/family on patient safety including physical limitations  - Instruct patient to call for assistance with activity   - Consult OT/PT to assist with strengthening/mobility   - Keep Call bell within reach  - Keep bed low and locked with side rails adjusted as appropriate  - Keep care items and personal belongings within reach  - Initiate and maintain comfort rounds  - Make Fall Risk Sign visible to staff  - Initiate/Maintain bed alarm  -  Apply yellow socks and bracelet for high fall risk patients  - Consider moving patient to room near nurses station  Outcome: Progressing  Goal: Maintain or return to baseline ADL function  Description: INTERVENTIONS:  -  Assess patient's ability to carry out ADLs; assess patient's baseline for ADL function and identify physical deficits which impact ability to perform ADLs (bathing, care of mouth/teeth, toileting, grooming, dressing, etc.)  - Assess/evaluate cause of self-care deficits   - Assess range of motion  - Assess patient's mobility; develop plan if impaired  - Assess patient's need for assistive devices and provide as appropriate  - Encourage maximum independence but intervene and supervise when necessary  - Involve family in performance of ADLs  - Assess for home care needs following discharge   - Consider OT consult to assist with ADL evaluation and planning for discharge  - Provide patient education as appropriate  Outcome: Progressing  Goal: Maintains/Returns to pre admission functional level  Description: INTERVENTIONS:  - Perform AM-PAC 6 Click Basic Mobility/ Daily Activity assessment daily.  - Set and communicate daily mobility goal to care team and patient/family/caregiver.   - Collaborate with rehabilitation services on mobility goals if consulted  - Out of bed for toileting  - Record patient progress and toleration of activity level   Outcome: Progressing     Problem: DISCHARGE PLANNING  Goal: Discharge to home or other facility with appropriate resources  Description: INTERVENTIONS:  - Identify barriers to discharge w/patient and caregiver  - Arrange for needed discharge resources and transportation as appropriate  - Identify discharge learning needs (meds, wound care, etc.)  - Arrange for interpretive services to assist at discharge as needed  - Refer to Case Management Department for coordinating discharge planning if the patient needs post-hospital services based on physician/advanced  practitioner order or complex needs related to functional status, cognitive ability, or social support system  Outcome: Progressing     Problem: Knowledge Deficit  Goal: Patient/family/caregiver demonstrates understanding of disease process, treatment plan, medications, and discharge instructions  Description: Complete learning assessment and assess knowledge base.  Interventions:  - Provide teaching at level of understanding  - Provide teaching via preferred learning methods  Outcome: Progressing     Problem: Prexisting or High Potential for Compromised Skin Integrity  Goal: Skin integrity is maintained or improved  Description: INTERVENTIONS:  - Identify patients at risk for skin breakdown  - Assess and monitor skin integrity  - Assess and monitor nutrition and hydration status  - Monitor labs   - Assess for incontinence   - Turn and reposition patient  - Assist with mobility/ambulation  - Relieve pressure over bony prominences  - Avoid friction and shearing  - Provide appropriate hygiene as needed including keeping skin clean and dry  - Evaluate need for skin moisturizer/barrier cream  - Collaborate with interdisciplinary team   - Patient/family teaching  - Consider wound care consult   Outcome: Progressing

## 2024-10-20 NOTE — ASSESSMENT & PLAN NOTE
"Wt Readings from Last 3 Encounters:   10/20/24 113 kg (249 lb 5.4 oz)   08/25/24 117 kg (257 lb 4.8 oz)   05/20/24 116 kg (256 lb)   No results found for: \"LVEF\", \"BNP\"    Patient appears euvolemic. To hypovolemic  Home regimen inlcudes Lasix 20mg QD  Last ECHO 2019 EF 60%, grade 1 diastolic dysfunction    Plan:  GDMT:  Diuretic: Lasix , B-Blocker: None, ACE/ARB/ARNi: None  Sodium restriction 2g  CMP, magnesium tomorrow a.m; Goal Mg > 2 and K > 4; Replete prn  HOB > 30°, Daily standing weights, Measure I/O  Will hold patient Lasix in the setting of suspected GIB      " Imaging Studies/Medications

## 2024-10-20 NOTE — PLAN OF CARE
Problem: PAIN - ADULT  Goal: Verbalizes/displays adequate comfort level or baseline comfort level  Description: Interventions:  - Encourage patient to monitor pain and request assistance  - Assess pain using appropriate pain scale  - Administer analgesics based on type and severity of pain and evaluate response  - Implement non-pharmacological measures as appropriate and evaluate response  - Consider cultural and social influences on pain and pain management  - Notify physician/advanced practitioner if interventions unsuccessful or patient reports new pain  Outcome: Progressing     Problem: INFECTION - ADULT  Goal: Absence or prevention of progression during hospitalization  Description: INTERVENTIONS:  - Assess and monitor for signs and symptoms of infection  - Monitor lab/diagnostic results  - Monitor all insertion sites, i.e. indwelling lines, tubes, and drains  - Monitor endotracheal if appropriate and nasal secretions for changes in amount and color  - [place] appropriate cooling/warming therapies per order  - Administer medications as ordered  - Instruct and encourage patient and family to use good hand hygiene technique  - Identify and instruct in appropriate isolation precautions for identified infection/condition  Outcome: Progressing  Goal: Absence of fever/infection during neutropenic period  Description: INTERVENTIONS:  - Monitor WBC    Outcome: Progressing     Problem: SAFETY ADULT  Goal: Patient will remain free of falls  Description: INTERVENTIONS:  - Educate patient/family on patient safety including physical limitations  - Instruct patient to call for assistance with activity   - Consult OT/PT to assist with strengthening/mobility   - Keep Call bell within reach  - Keep bed low and locked with side rails adjusted as appropriate  - Keep care items and personal belongings within reach  - Initiate and maintain comfort rounds  - Make Fall Risk Sign visible to staff  - Offer Toileting every  Hours,  in advance of need  - Initiate/Maintain alarm  - Obtain necessary fall risk management equipment:   - Apply yellow socks and bracelet for high fall risk patients  - Consider moving patient to room near nurses station  Outcome: Progressing  Goal: Maintain or return to baseline ADL function  Description: INTERVENTIONS:  -  Assess patient's ability to carry out ADLs; assess patient's baseline for ADL function and identify physical deficits which impact ability to perform ADLs (bathing, care of mouth/teeth, toileting, grooming, dressing, etc.)  - Assess/evaluate cause of self-care deficits   - Assess range of motion  - Assess patient's mobility; develop plan if impaired  - Assess patient's need for assistive devices and provide as appropriate  - Encourage maximum independence but intervene and supervise when necessary  - Involve family in performance of ADLs  - Assess for home care needs following discharge   - Consider OT consult to assist with ADL evaluation and planning for discharge  - Provide patient education as appropriate  Outcome: Progressing  Goal: Maintains/Returns to pre admission functional level  Description: INTERVENTIONS:  - Perform AM-PAC 6 Click Basic Mobility/ Daily Activity assessment daily.  - Set and communicate daily mobility goal to care team and patient/family/caregiver.   - Collaborate with rehabilitation services on mobility goals if consulted  - Perform Range of Motion  times a day.  - Reposition patient every  hours.  - Dangle patient  times a day  - Stand patient  times a day  - Ambulate patient  times a day  - Out of bed to chair  times a day   - Out of bed for meals times a day  - Out of bed for toileting  - Record patient progress and toleration of activity level   Outcome: Progressing     Problem: DISCHARGE PLANNING  Goal: Discharge to home or other facility with appropriate resources  Description: INTERVENTIONS:  - Identify barriers to discharge w/patient and caregiver  - Arrange for  needed discharge resources and transportation as appropriate  - Identify discharge learning needs (meds, wound care, etc.)  - Arrange for interpretive services to assist at discharge as needed  - Refer to Case Management Department for coordinating discharge planning if the patient needs post-hospital services based on physician/advanced practitioner order or complex needs related to functional status, cognitive ability, or social support system  Outcome: Progressing     Problem: Knowledge Deficit  Goal: Patient/family/caregiver demonstrates understanding of disease process, treatment plan, medications, and discharge instructions  Description: Complete learning assessment and assess knowledge base.  Interventions:  - Provide teaching at level of understanding  - Provide teaching via preferred learning methods  Outcome: Progressing     Problem: Prexisting or High Potential for Compromised Skin Integrity  Goal: Skin integrity is maintained or improved  Description: INTERVENTIONS:  - Identify patients at risk for skin breakdown  - Assess and monitor skin integrity  - Assess and monitor nutrition and hydration status  - Monitor labs   - Assess for incontinence   - Turn and reposition patient  - Assist with mobility/ambulation  - Relieve pressure over bony prominences  - Avoid friction and shearing  - Provide appropriate hygiene as needed including keeping skin clean and dry  - Evaluate need for skin moisturizer/barrier cream  - Collaborate with interdisciplinary team   - Patient/family teaching  - Consider wound care consult   Outcome: Progressing

## 2024-10-21 ENCOUNTER — ANESTHESIA EVENT (INPATIENT)
Dept: GASTROENTEROLOGY | Facility: HOSPITAL | Age: 74
DRG: 377 | End: 2024-10-21
Payer: MEDICARE

## 2024-10-21 ENCOUNTER — ANESTHESIA (INPATIENT)
Dept: GASTROENTEROLOGY | Facility: HOSPITAL | Age: 74
DRG: 377 | End: 2024-10-21
Payer: MEDICARE

## 2024-10-21 ENCOUNTER — APPOINTMENT (INPATIENT)
Dept: GASTROENTEROLOGY | Facility: HOSPITAL | Age: 74
DRG: 377 | End: 2024-10-21
Payer: MEDICARE

## 2024-10-21 ENCOUNTER — TELEPHONE (OUTPATIENT)
Age: 74
End: 2024-10-21

## 2024-10-21 VITALS
OXYGEN SATURATION: 94 % | TEMPERATURE: 97.4 F | RESPIRATION RATE: 20 BRPM | BODY MASS INDEX: 43.93 KG/M2 | WEIGHT: 248.02 LBS | HEART RATE: 75 BPM | DIASTOLIC BLOOD PRESSURE: 85 MMHG | SYSTOLIC BLOOD PRESSURE: 139 MMHG

## 2024-10-21 LAB
ANION GAP SERPL CALCULATED.3IONS-SCNC: 6 MMOL/L (ref 4–13)
BUN SERPL-MCNC: 8 MG/DL (ref 5–25)
CALCIUM SERPL-MCNC: 7.9 MG/DL (ref 8.4–10.2)
CHLORIDE SERPL-SCNC: 112 MMOL/L (ref 96–108)
CO2 SERPL-SCNC: 24 MMOL/L (ref 21–32)
CREAT SERPL-MCNC: 0.74 MG/DL (ref 0.6–1.3)
ERYTHROCYTE [DISTWIDTH] IN BLOOD BY AUTOMATED COUNT: 18.3 % (ref 11.6–15.1)
GFR SERPL CREATININE-BSD FRML MDRD: 90 ML/MIN/1.73SQ M
GLUCOSE SERPL-MCNC: 109 MG/DL (ref 65–140)
HCT VFR BLD AUTO: 28.9 % (ref 36.5–49.3)
HGB BLD-MCNC: 8 G/DL (ref 12–17)
MCH RBC QN AUTO: 22.5 PG (ref 26.8–34.3)
MCHC RBC AUTO-ENTMCNC: 27.7 G/DL (ref 31.4–37.4)
MCV RBC AUTO: 81 FL (ref 82–98)
PLATELET # BLD AUTO: 372 THOUSANDS/UL (ref 149–390)
PMV BLD AUTO: 11.5 FL (ref 8.9–12.7)
POTASSIUM SERPL-SCNC: 3.9 MMOL/L (ref 3.5–5.3)
RBC # BLD AUTO: 3.55 MILLION/UL (ref 3.88–5.62)
SODIUM SERPL-SCNC: 142 MMOL/L (ref 135–147)
WBC # BLD AUTO: 13.74 THOUSAND/UL (ref 4.31–10.16)

## 2024-10-21 PROCEDURE — 85027 COMPLETE CBC AUTOMATED: CPT

## 2024-10-21 PROCEDURE — 0DJD8ZZ INSPECTION OF LOWER INTESTINAL TRACT, VIA NATURAL OR ARTIFICIAL OPENING ENDOSCOPIC: ICD-10-PCS | Performed by: INTERNAL MEDICINE

## 2024-10-21 PROCEDURE — 80048 BASIC METABOLIC PNL TOTAL CA: CPT

## 2024-10-21 PROCEDURE — 45378 DIAGNOSTIC COLONOSCOPY: CPT | Performed by: INTERNAL MEDICINE

## 2024-10-21 PROCEDURE — 99239 HOSP IP/OBS DSCHRG MGMT >30: CPT | Performed by: HOSPITALIST

## 2024-10-21 RX ORDER — SODIUM CHLORIDE, SODIUM LACTATE, POTASSIUM CHLORIDE, CALCIUM CHLORIDE 600; 310; 30; 20 MG/100ML; MG/100ML; MG/100ML; MG/100ML
INJECTION, SOLUTION INTRAVENOUS CONTINUOUS PRN
Status: DISCONTINUED | OUTPATIENT
Start: 2024-10-21 | End: 2024-10-21

## 2024-10-21 RX ORDER — FERROUS SULFATE 324(65)MG
324 TABLET, DELAYED RELEASE (ENTERIC COATED) ORAL 3 TIMES WEEKLY
Start: 2024-10-21 | End: 2024-11-20

## 2024-10-21 RX ORDER — LIDOCAINE HYDROCHLORIDE 10 MG/ML
INJECTION, SOLUTION EPIDURAL; INFILTRATION; INTRACAUDAL; PERINEURAL AS NEEDED
Status: DISCONTINUED | OUTPATIENT
Start: 2024-10-21 | End: 2024-10-21

## 2024-10-21 RX ORDER — PANTOPRAZOLE SODIUM 40 MG/1
40 TABLET, DELAYED RELEASE ORAL 2 TIMES DAILY
Start: 2024-10-21 | End: 2024-11-20

## 2024-10-21 RX ORDER — PROPOFOL 10 MG/ML
INJECTION, EMULSION INTRAVENOUS AS NEEDED
Status: DISCONTINUED | OUTPATIENT
Start: 2024-10-21 | End: 2024-10-21

## 2024-10-21 RX ADMIN — PANTOPRAZOLE SODIUM 40 MG: 40 INJECTION, POWDER, FOR SOLUTION INTRAVENOUS at 08:29

## 2024-10-21 RX ADMIN — PROPOFOL 30 MG: 10 INJECTION, EMULSION INTRAVENOUS at 13:41

## 2024-10-21 RX ADMIN — B-COMPLEX W/ C & FOLIC ACID TAB 1 TABLET: TAB at 08:29

## 2024-10-21 RX ADMIN — IRON SUCROSE 300 MG: 20 INJECTION, SOLUTION INTRAVENOUS at 09:05

## 2024-10-21 RX ADMIN — PROPOFOL 100 MG: 10 INJECTION, EMULSION INTRAVENOUS at 13:30

## 2024-10-21 RX ADMIN — SODIUM CHLORIDE, SODIUM LACTATE, POTASSIUM CHLORIDE, AND CALCIUM CHLORIDE: .6; .31; .03; .02 INJECTION, SOLUTION INTRAVENOUS at 13:25

## 2024-10-21 RX ADMIN — SUCRALFATE 1 G: 1 TABLET ORAL at 07:45

## 2024-10-21 RX ADMIN — ACETAMINOPHEN 650 MG: 325 TABLET ORAL at 10:24

## 2024-10-21 RX ADMIN — LIDOCAINE HYDROCHLORIDE 50 MG: 10 INJECTION, SOLUTION EPIDURAL; INFILTRATION; INTRACAUDAL at 13:30

## 2024-10-21 NOTE — NURSING NOTE
RN Called [facility] x2 for report.No response. Patient in route. Awake, alert and oriented x4.In wheelchair. Transported via wheelchair van at 1630PM.

## 2024-10-21 NOTE — TELEPHONE ENCOUNTER
Antonina from [facility] called to speak to on call provider about discharge orders. Messaged on call Dr Yamileth Muñiz to reach out to marcus to follow up with orders.

## 2024-10-21 NOTE — CASE MANAGEMENT
Case Management Discharge Planning Note    Patient name Gaston Rucker  Location W /W -01 MRN [id number]  : 1950 Date 10/21/2024       Current Admission Date: 10/17/2024  Current Admission Diagnosis:Anemia   Patient Active Problem List    Diagnosis Date Noted Date Diagnosed    Melena 10/18/2024     Heme positive stool 10/18/2024     (HFpEF) heart failure with preserved ejection fraction (HCC) 10/17/2024     Leukocytosis 10/17/2024     Bipolar 1 disorder (HCC) 2023     Bilateral lower extremity edema 2023     Expiratory wheezing 2023     History of CVA (cerebrovascular accident) 2021     Anemia 2021     BPH (benign prostatic hyperplasia) 2021     Gastroesophageal reflux disease with esophagitis 2020     Depression 2020     Screen for colon cancer 2020     COVID 04/15/2020     Basal ganglia infarction ([facility]) 2019     Knee pain 2019     Multiple wounds of skin 2019     Thalamic infarct, acute ([facility]) 2019     Morbid obesity with BMI of 45.0-49.9, adult ([facility]) 2017     Multiple injuries 2017     Ambulatory dysfunction 2017     Falls frequently 2017     Prediabetes 2017     Coronary artery disease involving native coronary artery of native heart without angina pectoris 2017     Mild cognitive impairment 2017       LOS (days): 4  Geometric Mean LOS (GMLOS) (days): 2.8  Days to GMLOS:-1     OBJECTIVE:  Risk of Unplanned Readmission Score: 9.12         Current admission status: Inpatient   Preferred Pharmacy:   PATIENT/FAMILY REPORTS NO PREFERRED PHARMACY  No address on file      Primary Care Provider: Ru Mabry MD    Primary Insurance: MEDICARE  Secondary Insurance: [facility]    DISCHARGE DETAILS:    Discharge planning discussed with:: Patient     Comments - Freedom of Choice: Patient for discharge back to [facility]. Bed confirmed  with facility. IMM reviewed with patient and signed. WCV ordered  CM contacted family/caregiver?: No- see comments  Were Treatment Team discharge recommendations reviewed with patient/caregiver?: Yes  Did patient/caregiver verbalize understanding of patient care needs?: N/A- going to facility  Were patient/caregiver advised of the risks associated with not following Treatment Team discharge recommendations?: Yes                   Other Referral/Resources/Interventions Provided:  Interventions: Facility Return  Referral Comments: Patient returning to [facility]         Treatment Team Recommendation: Facility Return  Discharge Destination Plan:: Facility Return  Transport at Discharge : Wheelchair van                             IMM Given (Date):: 10/21/24  IMM Given to:: Patient     IMM reviewed with patient, patient agrees with discharge determination.

## 2024-10-21 NOTE — PLAN OF CARE
Problem: PAIN - ADULT  Goal: Verbalizes/displays adequate comfort level or baseline comfort level  Description: Interventions:  - Encourage patient to monitor pain and request assistance  - Assess pain using appropriate pain scale  - Administer analgesics based on type and severity of pain and evaluate response  - Implement non-pharmacological measures as appropriate and evaluate response  - Consider cultural and social influences on pain and pain management  - Notify physician/advanced practitioner if interventions unsuccessful or patient reports new pain  10/21/2024 1653 by Lidia Rodriguez RN  Outcome: Adequate for Discharge  10/21/2024 1640 by Lidia Rodriguez RN  Outcome: Progressing  10/21/2024 1203 by Lidia Rodriguez RN  Outcome: Progressing     Problem: INFECTION - ADULT  Goal: Absence or prevention of progression during hospitalization  Description: INTERVENTIONS:  - Assess and monitor for signs and symptoms of infection  - Monitor lab/diagnostic results  - Monitor all insertion sites, i.e. indwelling lines, tubes, and drains  - Monitor endotracheal if appropriate and nasal secretions for changes in amount and color  - [place] appropriate cooling/warming therapies per order  - Administer medications as ordered  - Instruct and encourage patient and family to use good hand hygiene technique  - Identify and instruct in appropriate isolation precautions for identified infection/condition  10/21/2024 1653 by Lidia Rodriguez RN  Outcome: Adequate for Discharge  10/21/2024 1640 by Lidia Rodriguez RN  Outcome: Progressing  10/21/2024 1203 by Lidia Rodriguez RN  Outcome: Progressing  Goal: Absence of fever/infection during neutropenic period  Description: INTERVENTIONS:  - Monitor WBC    10/21/2024 1653 by Lidia Rodriguez RN  Outcome: Adequate for Discharge  10/21/2024 1640 by Lidia Rdoriguez RN  Outcome: Progressing  10/21/2024 1203 by Lidia Rodriguez RN  Outcome: Progressing     Problem: SAFETY  ADULT  Goal: Patient will remain free of falls  Description: INTERVENTIONS:  - Educate patient/family on patient safety including physical limitations  - Instruct patient to call for assistance with activity   - Consult OT/PT to assist with strengthening/mobility   - Keep Call bell within reach  - Keep bed low and locked with side rails adjusted as appropriate  - Keep care items and personal belongings within reach  - Initiate and maintain comfort rounds  - Make Fall Risk Sign visible to staff  - Initiate/Maintain bed alarm  - Apply yellow socks and bracelet for high fall risk patients  - Consider moving patient to room near nurses station  10/21/2024 1653 by Lidia Rodriguez RN  Outcome: Adequate for Discharge  10/21/2024 1640 by Lidia Rodriguez RN  Outcome: Progressing  10/21/2024 1203 by Lidia Rodriguez RN  Outcome: Progressing  Goal: Maintain or return to baseline ADL function  Description: INTERVENTIONS:  -  Assess patient's ability to carry out ADLs; assess patient's baseline for ADL function and identify physical deficits which impact ability to perform ADLs (bathing, care of mouth/teeth, toileting, grooming, dressing, etc.)  - Assess/evaluate cause of self-care deficits   - Assess range of motion  - Assess patient's mobility; develop plan if impaired  - Assess patient's need for assistive devices and provide as appropriate  - Encourage maximum independence but intervene and supervise when necessary  - Involve family in performance of ADLs  - Assess for home care needs following discharge   - Consider OT consult to assist with ADL evaluation and planning for discharge  - Provide patient education as appropriate  10/21/2024 1653 by Lidia Rodriguez RN  Outcome: Adequate for Discharge  10/21/2024 1640 by Lidia Rodriguez RN  Outcome: Progressing  10/21/2024 1203 by Lidia Rodriguez RN  Outcome: Progressing  Goal: Maintains/Returns to pre admission functional level  Description: INTERVENTIONS:  - Perform  AM-PAC 6 Click Basic Mobility/ Daily Activity assessment daily.  - Set and communicate daily mobility goal to care team and patient/family/caregiver.   - Collaborate with rehabilitation services on mobility goals if consulted  - Out of bed for toileting  - Record patient progress and toleration of activity level   10/21/2024 1653 by Lidia Rodriguez RN  Outcome: Adequate for Discharge  10/21/2024 1640 by Lidia Rodriguez RN  Outcome: Progressing  10/21/2024 1203 by Lidia Rodriguez RN  Outcome: Progressing     Problem: DISCHARGE PLANNING  Goal: Discharge to home or other facility with appropriate resources  Description: INTERVENTIONS:  - Identify barriers to discharge w/patient and caregiver  - Arrange for needed discharge resources and transportation as appropriate  - Identify discharge learning needs (meds, wound care, etc.)  - Arrange for interpretive services to assist at discharge as needed  - Refer to Case Management Department for coordinating discharge planning if the patient needs post-hospital services based on physician/advanced practitioner order or complex needs related to functional status, cognitive ability, or social support system  10/21/2024 1653 by Lidia Rodriguez RN  Outcome: Adequate for Discharge  10/21/2024 1640 by Lidia Rodriguez RN  Outcome: Progressing  10/21/2024 1203 by Lidia Rodriguez RN  Outcome: Progressing     Problem: Prexisting or High Potential for Compromised Skin Integrity  Goal: Skin integrity is maintained or improved  Description: INTERVENTIONS:  - Identify patients at risk for skin breakdown  - Assess and monitor skin integrity  - Assess and monitor nutrition and hydration status  - Monitor labs   - Assess for incontinence   - Turn and reposition patient  - Assist with mobility/ambulation  - Relieve pressure over bony prominences  - Avoid friction and shearing  - Provide appropriate hygiene as needed including keeping skin clean and dry  - Evaluate need for skin  moisturizer/barrier cream  - Collaborate with interdisciplinary team   - Patient/family teaching  - Consider wound care consult   10/21/2024 1653 by Lidia Rodriguez RN  Outcome: Adequate for Discharge  10/21/2024 1640 by Lidia Rodriguez RN  Outcome: Progressing  10/21/2024 1203 by Lidia Rodriguez RN  Outcome: Progressing

## 2024-10-21 NOTE — PLAN OF CARE
Problem: PAIN - ADULT  Goal: Verbalizes/displays adequate comfort level or baseline comfort level  Description: Interventions:  - Encourage patient to monitor pain and request assistance  - Assess pain using appropriate pain scale  - Administer analgesics based on type and severity of pain and evaluate response  - Implement non-pharmacological measures as appropriate and evaluate response  - Consider cultural and social influences on pain and pain management  - Notify physician/advanced practitioner if interventions unsuccessful or patient reports new pain  Outcome: Progressing     Problem: INFECTION - ADULT  Goal: Absence or prevention of progression during hospitalization  Description: INTERVENTIONS:  - Assess and monitor for signs and symptoms of infection  - Monitor lab/diagnostic results  - Monitor all insertion sites, i.e. indwelling lines, tubes, and drains  - Monitor endotracheal if appropriate and nasal secretions for changes in amount and color  - [place] appropriate cooling/warming therapies per order  - Administer medications as ordered  - Instruct and encourage patient and family to use good hand hygiene technique  - Identify and instruct in appropriate isolation precautions for identified infection/condition  Outcome: Progressing  Goal: Absence of fever/infection during neutropenic period  Description: INTERVENTIONS:  - Monitor WBC    Outcome: Progressing     Problem: SAFETY ADULT  Goal: Patient will remain free of falls  Description: INTERVENTIONS:  - Educate patient/family on patient safety including physical limitations  - Instruct patient to call for assistance with activity   - Consult OT/PT to assist with strengthening/mobility   - Keep Call bell within reach  - Keep bed low and locked with side rails adjusted as appropriate  - Keep care items and personal belongings within reach  - Initiate and maintain comfort rounds  - Make Fall Risk Sign visible to staff  - Initiate/Maintain bed alarm  -  Apply yellow socks and bracelet for high fall risk patients  - Consider moving patient to room near nurses station  Outcome: Progressing  Goal: Maintain or return to baseline ADL function  Description: INTERVENTIONS:  -  Assess patient's ability to carry out ADLs; assess patient's baseline for ADL function and identify physical deficits which impact ability to perform ADLs (bathing, care of mouth/teeth, toileting, grooming, dressing, etc.)  - Assess/evaluate cause of self-care deficits   - Assess range of motion  - Assess patient's mobility; develop plan if impaired  - Assess patient's need for assistive devices and provide as appropriate  - Encourage maximum independence but intervene and supervise when necessary  - Involve family in performance of ADLs  - Assess for home care needs following discharge   - Consider OT consult to assist with ADL evaluation and planning for discharge  - Provide patient education as appropriate  Outcome: Progressing  Goal: Maintains/Returns to pre admission functional level  Description: INTERVENTIONS:  - Perform AM-PAC 6 Click Basic Mobility/ Daily Activity assessment daily.  - Set and communicate daily mobility goal to care team and patient/family/caregiver.   - Collaborate with rehabilitation services on mobility goals if consulted  - Out of bed for toileting  - Record patient progress and toleration of activity level   Outcome: Progressing     Problem: DISCHARGE PLANNING  Goal: Discharge to home or other facility with appropriate resources  Description: INTERVENTIONS:  - Identify barriers to discharge w/patient and caregiver  - Arrange for needed discharge resources and transportation as appropriate  - Identify discharge learning needs (meds, wound care, etc.)  - Arrange for interpretive services to assist at discharge as needed  - Refer to Case Management Department for coordinating discharge planning if the patient needs post-hospital services based on physician/advanced  practitioner order or complex needs related to functional status, cognitive ability, or social support system  Outcome: Progressing     Problem: Knowledge Deficit  Goal: Patient/family/caregiver demonstrates understanding of disease process, treatment plan, medications, and discharge instructions  Description: Complete learning assessment and assess knowledge base.  Interventions:  - Provide teaching at level of understanding  - Provide teaching via preferred learning methods  Outcome: Progressing     Problem: Prexisting or High Potential for Compromised Skin Integrity  Goal: Skin integrity is maintained or improved  Description: INTERVENTIONS:  - Identify patients at risk for skin breakdown  - Assess and monitor skin integrity  - Assess and monitor nutrition and hydration status  - Monitor labs   - Assess for incontinence   - Turn and reposition patient  - Assist with mobility/ambulation  - Relieve pressure over bony prominences  - Avoid friction and shearing  - Provide appropriate hygiene as needed including keeping skin clean and dry  - Evaluate need for skin moisturizer/barrier cream  - Collaborate with interdisciplinary team   - Patient/family teaching  - Consider wound care consult   Outcome: Progressing

## 2024-10-21 NOTE — ANESTHESIA POSTPROCEDURE EVALUATION
Post-Op Assessment Note    CV Status:  Stable  Pain Score: 0    Pain management: adequate       Mental Status:  Alert and awake   Hydration Status:  Euvolemic   PONV Controlled:  Controlled   Airway Patency:  Patent     Post Op Vitals Reviewed: Yes    No anethesia notable event occurred.    Staff: Anesthesiologist       Last Filed PACU Vitals:  Vitals Value Taken Time   Temp 97.4 °F (36.3 °C) 10/21/24 1347   Pulse 84 10/21/24 1407   /78 10/21/24 1407   Resp 16 10/21/24 1407   SpO2 99 % 10/21/24 1407       Modified Russ:  Activity: 2 (10/21/2024  2:14 PM)  Respiration: 2 (10/21/2024  2:14 PM)  Circulation: 2 (10/21/2024  2:14 PM)  Consciousness: 2 (10/21/2024  2:14 PM)  Oxygen Saturation: 2 (10/21/2024  2:14 PM)  Modified Russ Score: 10 (10/21/2024  2:14 PM)

## 2024-10-21 NOTE — PLAN OF CARE
Problem: PAIN - ADULT  Goal: Verbalizes/displays adequate comfort level or baseline comfort level  Description: Interventions:  - Encourage patient to monitor pain and request assistance  - Assess pain using appropriate pain scale  - Administer analgesics based on type and severity of pain and evaluate response  - Implement non-pharmacological measures as appropriate and evaluate response  - Consider cultural and social influences on pain and pain management  - Notify physician/advanced practitioner if interventions unsuccessful or patient reports new pain  Outcome: Progressing     Problem: INFECTION - ADULT  Goal: Absence or prevention of progression during hospitalization  Description: INTERVENTIONS:  - Assess and monitor for signs and symptoms of infection  - Monitor lab/diagnostic results  - Monitor all insertion sites, i.e. indwelling lines, tubes, and drains  - Monitor endotracheal if appropriate and nasal secretions for changes in amount and color  - [place] appropriate cooling/warming therapies per order  - Administer medications as ordered  - Instruct and encourage patient and family to use good hand hygiene technique  - Identify and instruct in appropriate isolation precautions for identified infection/condition  Outcome: Progressing  Goal: Absence of fever/infection during neutropenic period  Description: INTERVENTIONS:  - Monitor WBC    Outcome: Progressing     Problem: SAFETY ADULT  Goal: Patient will remain free of falls  Description: INTERVENTIONS:  - Educate patient/family on patient safety including physical limitations  - Instruct patient to call for assistance with activity   - Consult OT/PT to assist with strengthening/mobility   - Keep Call bell within reach  - Keep bed low and locked with side rails adjusted as appropriate  - Keep care items and personal belongings within reach  - Initiate and maintain comfort rounds  - Make Fall Risk Sign visible to staff  - Offer Toileting every  Hours,  in advance of need  - Initiate/Maintain alarm  - Obtain necessary fall risk management equipment:   - Apply yellow socks and bracelet for high fall risk patients  - Consider moving patient to room near nurses station  Outcome: Progressing  Goal: Maintain or return to baseline ADL function  Description: INTERVENTIONS:  -  Assess patient's ability to carry out ADLs; assess patient's baseline for ADL function and identify physical deficits which impact ability to perform ADLs (bathing, care of mouth/teeth, toileting, grooming, dressing, etc.)  - Assess/evaluate cause of self-care deficits   - Assess range of motion  - Assess patient's mobility; develop plan if impaired  - Assess patient's need for assistive devices and provide as appropriate  - Encourage maximum independence but intervene and supervise when necessary  - Involve family in performance of ADLs  - Assess for home care needs following discharge   - Consider OT consult to assist with ADL evaluation and planning for discharge  - Provide patient education as appropriate  Outcome: Progressing  Goal: Maintains/Returns to pre admission functional level  Description: INTERVENTIONS:  - Perform AM-PAC 6 Click Basic Mobility/ Daily Activity assessment daily.  - Set and communicate daily mobility goal to care team and patient/family/caregiver.   - Collaborate with rehabilitation services on mobility goals if consulted  - Perform Range of Motion  times a day.  - Reposition patient every  hours.  - Dangle patient  times a day  - Stand patient  times a day  - Ambulate patient  times a day  - Out of bed to chair  times a day   - Out of bed for meals  times a day  - Out of bed for toileting  - Record patient progress and toleration of activity level   Outcome: Progressing     Problem: DISCHARGE PLANNING  Goal: Discharge to home or other facility with appropriate resources  Description: INTERVENTIONS:  - Identify barriers to discharge w/patient and caregiver  - Arrange for  needed discharge resources and transportation as appropriate  - Identify discharge learning needs (meds, wound care, etc.)  - Arrange for interpretive services to assist at discharge as needed  - Refer to Case Management Department for coordinating discharge planning if the patient needs post-hospital services based on physician/advanced practitioner order or complex needs related to functional status, cognitive ability, or social support system  Outcome: Progressing     Problem: Knowledge Deficit  Goal: Patient/family/caregiver demonstrates understanding of disease process, treatment plan, medications, and discharge instructions  Description: Complete learning assessment and assess knowledge base.  Interventions:  - Provide teaching at level of understanding  - Provide teaching via preferred learning methods  Outcome: Progressing     Problem: Prexisting or High Potential for Compromised Skin Integrity  Goal: Skin integrity is maintained or improved  Description: INTERVENTIONS:  - Identify patients at risk for skin breakdown  - Assess and monitor skin integrity  - Assess and monitor nutrition and hydration status  - Monitor labs   - Assess for incontinence   - Turn and reposition patient  - Assist with mobility/ambulation  - Relieve pressure over bony prominences  - Avoid friction and shearing  - Provide appropriate hygiene as needed including keeping skin clean and dry  - Evaluate need for skin moisturizer/barrier cream  - Collaborate with interdisciplinary team   - Patient/family teaching  - Consider wound care consult   Outcome: Progressing

## 2024-10-21 NOTE — PLAN OF CARE
Problem: PAIN - ADULT  Goal: Verbalizes/displays adequate comfort level or baseline comfort level  Description: Interventions:  - Encourage patient to monitor pain and request assistance  - Assess pain using appropriate pain scale  - Administer analgesics based on type and severity of pain and evaluate response  - Implement non-pharmacological measures as appropriate and evaluate response  - Consider cultural and social influences on pain and pain management  - Notify physician/advanced practitioner if interventions unsuccessful or patient reports new pain  10/21/2024 1640 by Lidia Rodriguez RN  Outcome: Progressing  10/21/2024 1203 by Lidia Rodriguez RN  Outcome: Progressing     Problem: INFECTION - ADULT  Goal: Absence or prevention of progression during hospitalization  Description: INTERVENTIONS:  - Assess and monitor for signs and symptoms of infection  - Monitor lab/diagnostic results  - Monitor all insertion sites, i.e. indwelling lines, tubes, and drains  - Monitor endotracheal if appropriate and nasal secretions for changes in amount and color  - [place] appropriate cooling/warming therapies per order  - Administer medications as ordered  - Instruct and encourage patient and family to use good hand hygiene technique  - Identify and instruct in appropriate isolation precautions for identified infection/condition  10/21/2024 1640 by Lidia Rodriguez RN  Outcome: Progressing  10/21/2024 1203 by Lidia Rodriguez RN  Outcome: Progressing  Goal: Absence of fever/infection during neutropenic period  Description: INTERVENTIONS:  - Monitor WBC    10/21/2024 1640 by Lidia Rodriguez RN  Outcome: Progressing  10/21/2024 1203 by Lidia Rodriguez RN  Outcome: Progressing     Problem: SAFETY ADULT  Goal: Patient will remain free of falls  Description: INTERVENTIONS:  - Educate patient/family on patient safety including physical limitations  - Instruct patient to call for assistance with activity   - Consult OT/PT  to assist with strengthening/mobility   - Keep Call bell within reach  - Keep bed low and locked with side rails adjusted as appropriate  - Keep care items and personal belongings within reach  - Initiate and maintain comfort rounds  - Make Fall Risk Sign visible to staff  - Initiate/Maintain bed alarM  - Apply yellow socks and bracelet for high fall risk patients  - Consider moving patient to room near nurses station  10/21/2024 1640 by Lidia Rodriguez RN  Outcome: Progressing  10/21/2024 1203 by Lidia Rodriguez RN  Outcome: Progressing  Goal: Maintain or return to baseline ADL function  Description: INTERVENTIONS:  -  Assess patient's ability to carry out ADLs; assess patient's baseline for ADL function and identify physical deficits which impact ability to perform ADLs (bathing, care of mouth/teeth, toileting, grooming, dressing, etc.)  - Assess/evaluate cause of self-care deficits   - Assess range of motion  - Assess patient's mobility; develop plan if impaired  - Assess patient's need for assistive devices and provide as appropriate  - Encourage maximum independence but intervene and supervise when necessary  - Involve family in performance of ADLs  - Assess for home care needs following discharge   - Consider OT consult to assist with ADL evaluation and planning for discharge  - Provide patient education as appropriate  10/21/2024 1640 by Lidia Rodriguez RN  Outcome: Progressing  10/21/2024 1203 by Lidia Rodriguez RN  Outcome: Progressing  Goal: Maintains/Returns to pre admission functional level  Description: INTERVENTIONS:  - Perform AM-PAC 6 Click Basic Mobility/ Daily Activity assessment daily.  - Set and communicate daily mobility goal to care team and patient/family/caregiver.   - Collaborate with rehabilitation services on mobility goals if consulted  - Out of bed for toileting  - Record patient progress and toleration of activity level   10/21/2024 1640 by Lidia Rodriguez RN  Outcome:  Progressing  10/21/2024 1203 by Lidia Rodriguez RN  Outcome: Progressing     Problem: Knowledge Deficit  Goal: Patient/family/caregiver demonstrates understanding of disease process, treatment plan, medications, and discharge instructions  Description: Complete learning assessment and assess knowledge base.  Interventions:  - Provide teaching at level of understanding  - Provide teaching via preferred learning methods  10/21/2024 1640 by Lidia Rodriguez RN  Outcome: Progressing  10/21/2024 1203 by Lidia Rodriguez RN  Outcome: Progressing

## 2024-10-21 NOTE — ANESTHESIA POSTPROCEDURE EVALUATION
Post-Op Assessment Note    CV Status:  Stable  Pain Score: 0    Pain management: adequate       Mental Status:  Sleepy and arousable   Hydration Status:  Euvolemic and stable   PONV Controlled:  Controlled   Airway Patency:  Patent     Post Op Vitals Reviewed: Yes    No anethesia notable event occurred.    Staff: CRNA           Last Filed PACU Vitals:  Vitals Value Taken Time   Temp 97.4 °F (36.3 °C) 10/21/24 1347   Pulse 79 10/21/24 1347   /75 10/21/24 1347   Resp 16 10/21/24 1347   SpO2 91 % 10/21/24 1347       Modified Russ:  Activity: 0 (10/21/2024  1:49 PM)  Respiration: 2 (10/21/2024  1:49 PM)  Circulation: 2 (10/21/2024  1:49 PM)  Consciousness: 0 (10/21/2024  1:49 PM)  Oxygen Saturation: 1 (10/21/2024  1:49 PM)  Modified Russ Score: 5 (10/21/2024  1:49 PM)

## 2024-10-21 NOTE — ANESTHESIA PREPROCEDURE EVALUATION
Procedure:  COLONOSCOPY    Relevant Problems   CARDIO   (+) Coronary artery disease involving native coronary artery of native heart without angina pectoris      GI/HEPATIC   (+) Gastroesophageal reflux disease with esophagitis      /RENAL   (+) BPH (benign prostatic hyperplasia)      HEMATOLOGY   (+) Anemia      NEURO/PSYCH   (+) Depression      Digestive   (+) Melena      Behavioral Health   (+) Bipolar 1 disorder (HCC)      Neurology/Sleep   (+) Basal ganglia infarction (HCC)   (+) History of CVA (cerebrovascular accident)      Cardiovascular/Peripheral Vascular   (+) (HFpEF) heart failure with preserved ejection fraction (HCC)      Other   (+) Morbid obesity with BMI of 45.0-49.9, adult ([facility])        Physical Exam    Airway    Mallampati score: II  TM Distance: >3 FB  Neck ROM: full     Dental       Cardiovascular  Cardiovascular exam normal    Pulmonary  Pulmonary exam normal     Other Findings    Anesthesia Plan  ASA Score- 3     Anesthesia Type- IV sedation with anesthesia with ASA Monitors.         Additional Monitors:     Airway Plan:     Comment: RBC Antbodies noted.       Plan Factors-Exercise tolerance (METS): >4 METS.    Chart reviewed. EKG reviewed. Imaging results reviewed. Existing labs reviewed. Patient summary reviewed.    Patient is not a current smoker.              Induction- intravenous.    Postoperative Plan-     Perioperative Resuscitation Plan - Level 1 - Full Code.       Informed Consent- Anesthetic plan and risks discussed with patient.  I personally reviewed this patient with the CRNA. Discussed and agreed on the Anesthesia Plan with the CRNA..

## 2024-10-22 ENCOUNTER — NURSING HOME VISIT (OUTPATIENT)
Dept: GERIATRICS | Facility: OTHER | Age: 74
End: 2024-10-22
Payer: MEDICARE

## 2024-10-22 DIAGNOSIS — D50.9 IRON DEFICIENCY ANEMIA, UNSPECIFIED IRON DEFICIENCY ANEMIA TYPE: ICD-10-CM

## 2024-10-22 DIAGNOSIS — N40.0 BENIGN PROSTATIC HYPERPLASIA, UNSPECIFIED WHETHER LOWER URINARY TRACT SYMPTOMS PRESENT: ICD-10-CM

## 2024-10-22 DIAGNOSIS — F31.9 BIPOLAR 1 DISORDER (HCC): ICD-10-CM

## 2024-10-22 DIAGNOSIS — Z86.73 HISTORY OF CVA (CEREBROVASCULAR ACCIDENT): ICD-10-CM

## 2024-10-22 DIAGNOSIS — R60.0 BILATERAL LOWER EXTREMITY EDEMA: Chronic | ICD-10-CM

## 2024-10-22 DIAGNOSIS — K21.00 GASTROESOPHAGEAL REFLUX DISEASE WITH ESOPHAGITIS WITHOUT HEMORRHAGE: Primary | ICD-10-CM

## 2024-10-22 PROCEDURE — 99306 1ST NF CARE HIGH MDM 50: CPT | Performed by: FAMILY MEDICINE

## 2024-10-22 NOTE — ASSESSMENT & PLAN NOTE
EGD (10/18): Grade D esophagitis with mucosal breaks measuring 5 mm or more, continuous between folds, covering 75% or more of the circumference. Hill classification: Grade III. Mild, patchy erythematous mucosa with erosion in the fundus of the stomach.     Continue Protonix 40 mg, PO, BID  Continue Sulcrafate 1 g, PO, BID

## 2024-10-22 NOTE — ASSESSMENT & PLAN NOTE
Pt wheelchair bound. Home regimen includes Aspirin and Lipitor.     Continue Aspirin 81 mg, PO, QD   Continue Lipitor 40 mg, PO, QD

## 2024-10-22 NOTE — ASSESSMENT & PLAN NOTE
Pt presented to [facility] with symptomatic anemia and OP Hgb lab value of 5.8. Pt received one PRBC in while admitted. Also 3 doses of Venofer. Pt underwent EGD and colonoscopy for hx of dark stools prior to episode of anemia.     EGD (10/18): Grade D esophagitis with mucosal breaks measuring 5 mm or more, continuous between folds, covering 75% or more of the circumference. Starting from 27 to 35 cm. 6 cm sliding hiatal hernia (type I hiatal hernia) without Damien lesions present - GE junction 35 cm from the incisors, diaphragmatic impression 41 cm from the incisors, confirmed by retroflexion:  Hill classification: Grade III. Mild, patchy erythematous mucosa with erosion in the fundus of the stomach. Retroflexed view is notable for sliding hiatal hernia, pylorus is patent. The duodenal bulb, 1st part of the duodenum and 2nd part of the duodenum appeared normal.    Colonoscopy (10/18): Localized diverticulosis of severe severity containing stool and causing moderate luminal narrowing in the sigmoid colon. Medium, protruding hemorrhoids.     Continue regular diet  Monitor stool output  Monitor hemoglobin, transfuse as needed  Continue Ferrous sulfate 324 mg, PO, three times weekly

## 2024-10-22 NOTE — PROGRESS NOTES
[facility]  [address]  [place] [address]   [facility]  History and Physical    NAME: Gaston Rucker    AGE: 73 y.o.   SEX: Male   MRN: [id number]    DATE OF ENCOUNTER: 10/22/2024    Code status:  CPR    Assessment and Plan     Anemia    Pt presented to [facility] with symptomatic anemia and OP Hgb lab value of 5.8. Pt received one PRBC in while admitted. Also 3 doses of Venofer. Pt underwent EGD and colonoscopy for hx of dark stools prior to episode of anemia.     EGD (10/18): Grade D esophagitis with mucosal breaks measuring 5 mm or more, continuous between folds, covering 75% or more of the circumference. Starting from 27 to 35 cm. 6 cm sliding hiatal hernia (type I hiatal hernia) without Damien lesions present - GE junction 35 cm from the incisors, diaphragmatic impression 41 cm from the incisors, confirmed by retroflexion:  Hill classification: Grade III. Mild, patchy erythematous mucosa with erosion in the fundus of the stomach. Retroflexed view is notable for sliding hiatal hernia, pylorus is patent. The duodenal bulb, 1st part of the duodenum and 2nd part of the duodenum appeared normal.    Colonoscopy (10/18): Localized diverticulosis of severe severity containing stool and causing moderate luminal narrowing in the sigmoid colon. Medium, protruding hemorrhoids.     Continue regular diet  Monitor stool output  Monitor hemoglobin, transfuse as needed  Repeat CBC in about 2-3 days   Continue Ferrous sulfate 324 mg, PO, three times weekly    Gastroesophageal reflux disease with esophagitis    EGD (10/18): Grade D esophagitis with mucosal breaks measuring 5 mm or more, continuous between folds, covering 75% or more of the circumference. Hill classification: Grade III. Mild, patchy erythematous mucosa with erosion in the fundus of the stomach.     Continue Protonix 40 mg, PO, BID  Continue Sulcrafate 1 g, PO, BID    Bilateral lower extremity edema    Pt  presenting with bilateral lower extremity pitting edema, 3+. Pt priorly on Furosemide.     Continue Furosemide 20 mg, PO, QD for edema  Consider increasing dosage for 3 days     Bipolar 1 disorder (HCC)    Pt in good mood. Stable.     Continue  Depakote 125 mg, PO, QD HS    Ambulatory dysfunction    Pt was AAOx3. Calm and cooperative. Pt sitting in wheelchair.     Continue assistive devices   Tylenol 325 mg, 2 tabs, PO, Q6H PRN for pain   Vit D 2,000 Units, PO, QD   MVM 1 tab, PO, QD     BPH (benign prostatic hyperplasia)    Stable. On Flomax.     Continue Tamsulosin 0.4 mg, PO, QD    History of CVA (cerebrovascular accident)    Pt wheelchair bound. Home regimen includes Aspirin and Lipitor.     Continue Aspirin 81 mg, PO, QD   Continue Lipitor 40 mg, PO, QD     All medications and routine orders were reviewed and updated as needed.    Plan discussed with: Patient    Chief Complaint     Seen for admission at Nursing Facility    History of Present Illness     Gaston Rucker is a 73 y.o. M w PMHx of CVA, HF, GERD and BPH that presented on 10/17/24 to [facility] with symptomatic anemia. Pt was noted to have a Hgb of 5.8 in the OP setting and hx of dark stools for the prior few months. GI was consulted. EGD and  Colonoscopy were performed in addition to placing the pt on IV PPI. Pt was noted to have grade D esophagitis, erythematous mucosa with erosions in the fundus of the stomach, and severe diverticulosis with moderate luminal narrowing. Pt was given Venofer and was discharged on PPI and oral iron.     Pt returned to [facility]. He is a long term resident.     HISTORY    Past Medical History:   Diagnosis Date    Anemia     Anxiety     Groves's esophagus     BPH (benign prostatic hyperplasia)     COVID-19     Depression     Esophageal ulcer     Hematemesis     HTN (hypertension) 12/05/2017    Hyperlipidemia     Hypertension     MCI (mild cognitive impairment)     Melena 10/18/2024    Muscle  weakness     TIA (transient ischemic attack)      No family history on file.  Social History     Socioeconomic History    Marital status: Single     Spouse name: Not on file    Number of children: Not on file    Years of education: Not on file    Highest education level: Not on file   Occupational History    Not on file   Tobacco Use    Smoking status: Never    Smokeless tobacco: Never   Substance and Sexual Activity    Alcohol use: Not Currently    Drug use: No    Sexual activity: Not on file   Other Topics Concern    Not on file   Social History Narrative    Not on file     Social Determinants of Health     Financial Resource Strain: Not on file   Food Insecurity: No Food Insecurity (10/18/2024)    Nursing - Inadequate Food Risk Classification     Worried About Running Out of Food in the Last Year: Never true     Ran Out of Food in the Last Year: Never true     Ran Out of Food in the Last Year: Not on file   Transportation Needs: No Transportation Needs (10/18/2024)    PRAPARE - Transportation     Lack of Transportation (Medical): No     Lack of Transportation (Non-Medical): No   Physical Activity: Not on file   Stress: Not on file   Social Connections: Not on file   Intimate Partner Violence: Not on file   Housing Stability: Low Risk  (10/18/2024)    Housing Stability Vital Sign     Unable to Pay for Housing in the Last Year: No     Number of Times Moved in the Last Year: 0     Homeless in the Last Year: No     Allergies    No Known Allergies    Review of Systems     Review of Systems   Constitutional:  Negative for chills and fever.   HENT:  Negative for ear pain and sore throat.    Eyes:  Negative for pain and visual disturbance.   Respiratory:  Negative for cough and shortness of breath.    Cardiovascular:  Negative for chest pain and palpitations.   Gastrointestinal:  Negative for abdominal pain, diarrhea, nausea and vomiting.   Genitourinary:  Negative for dysuria.   Musculoskeletal:  Negative for  arthralgias and back pain.   Skin:  Negative for color change.   Neurological:  Negative for seizures and syncope.   Psychiatric/Behavioral:  Negative for sleep disturbance. The patient is not nervous/anxious.    All other systems reviewed and are negative.    Medications and orders     All medications reviewed and updated in care home EMR.    Objective     Vitals:     Wt: 283.4 lbs  T: 97.4 F  BP: 142/74  HR: 68 bpm   SpO2: 94%  BS: 119 mg/dL  RR: 18 bpm    Physical Exam  Vitals and nursing note reviewed.   Constitutional:       General: He is not in acute distress.     Appearance: He is obese. He is not ill-appearing or toxic-appearing.   HENT:      Head: Normocephalic and atraumatic.      Right Ear: External ear normal.      Left Ear: External ear normal.      Nose: Nose normal.      Mouth/Throat:      Mouth: Mucous membranes are moist.   Eyes:      General: No scleral icterus.     Conjunctiva/sclera: Conjunctivae normal.   Cardiovascular:      Rate and Rhythm: Normal rate and regular rhythm.      Pulses: Normal pulses.      Heart sounds: Normal heart sounds.   Pulmonary:      Effort: Pulmonary effort is normal. No respiratory distress.      Breath sounds: Normal breath sounds. No wheezing.      Comments: Heavy breathing  Abdominal:      General: Abdomen is flat. Bowel sounds are normal. There is no distension.      Palpations: Abdomen is soft.      Tenderness: There is no abdominal tenderness.   Musculoskeletal:         General: Swelling present. No tenderness. Normal range of motion.      Cervical back: Normal range of motion.      Right lower leg: 3+ Pitting Edema present.      Left lower leg: 3+ Pitting Edema present.   Skin:     General: Skin is warm and dry.      Capillary Refill: Capillary refill takes less than 2 seconds.   Neurological:      Mental Status: He is alert and oriented to person, place, and time.      Comments: In wheelchair    Psychiatric:         Mood and Affect: Mood normal.          Speech: Speech is delayed.         Behavior: Behavior is slowed. Behavior is cooperative.      Comments: Occasionally falling asleep while spoken to     Pertinent Laboratory/Diagnostic Studies:     The following labs/studies were reviewed please see chart or hospital paperwork for details.    Lab Results   Component Value Date    WBC 13.74 (H) 10/21/2024    HGB 8.0 (L) 10/21/2024    HCT 28.9 (L) 10/21/2024    MCV 81 (L) 10/21/2024     10/21/2024     Lab Results   Component Value Date    SODIUM 142 10/21/2024    K 3.9 10/21/2024     (H) 10/21/2024    CO2 24 10/21/2024    AGAP 6 10/21/2024    BUN 8 10/21/2024    CREATININE 0.74 10/21/2024    GLUC 109 10/21/2024    GLUF 93 04/12/2019    CALCIUM 7.9 (L) 10/21/2024    AST 37 10/17/2024    ALT 11 10/17/2024    ALKPHOS 144 (H) 10/17/2024    TP 6.7 10/17/2024    TBILI 0.32 10/17/2024    EGFR 90 10/21/2024     - Counseling Documentation: patient was counseled regarding: instructions for management.    It was a pleasure to be of service to Gaston Rucker.    Jeanine Mcclellan MD, MSMS - PGY4  [facility] / Geriatric Fellow     Date: 10/22/2024  Time: 3:08 PM

## 2024-10-22 NOTE — ASSESSMENT & PLAN NOTE
Pt was AAOx3. Calm and cooperative. Pt sitting in wheelchair.     Continue assistive devices   Tylenol 325 mg, 2 tabs, PO, Q6H PRN for pain   Vit D 2,000 Units, PO, QD   MVM 1 tab, PO, QD

## 2024-10-22 NOTE — ASSESSMENT & PLAN NOTE
Pt presenting with bilateral lower extremity pitting edema, 3+. Pt priorly on Furosemide.     Continue Furosemide 20 mg, PO, QD for edema

## 2024-11-17 PROBLEM — Z12.11 SCREEN FOR COLON CANCER: Status: RESOLVED | Noted: 2020-04-17 | Resolved: 2024-11-17

## 2024-12-27 ENCOUNTER — NURSING HOME VISIT (OUTPATIENT)
Dept: GERIATRICS | Facility: OTHER | Age: 74
End: 2024-12-27
Payer: MEDICARE

## 2024-12-27 DIAGNOSIS — G31.84 MILD COGNITIVE IMPAIRMENT: Chronic | ICD-10-CM

## 2024-12-27 DIAGNOSIS — N40.0 BENIGN PROSTATIC HYPERPLASIA, UNSPECIFIED WHETHER LOWER URINARY TRACT SYMPTOMS PRESENT: ICD-10-CM

## 2024-12-27 DIAGNOSIS — I25.10 CORONARY ARTERY DISEASE INVOLVING NATIVE CORONARY ARTERY OF NATIVE HEART WITHOUT ANGINA PECTORIS: Primary | ICD-10-CM

## 2024-12-27 DIAGNOSIS — D50.9 IRON DEFICIENCY ANEMIA, UNSPECIFIED IRON DEFICIENCY ANEMIA TYPE: ICD-10-CM

## 2024-12-27 DIAGNOSIS — R26.2 AMBULATORY DYSFUNCTION: ICD-10-CM

## 2024-12-27 DIAGNOSIS — F31.9 BIPOLAR 1 DISORDER (HCC): ICD-10-CM

## 2024-12-27 DIAGNOSIS — R05.1 ACUTE COUGH: ICD-10-CM

## 2024-12-27 PROCEDURE — 99309 SBSQ NF CARE MODERATE MDM 30: CPT

## 2024-12-28 VITALS
DIASTOLIC BLOOD PRESSURE: 89 MMHG | RESPIRATION RATE: 18 BRPM | HEART RATE: 80 BPM | OXYGEN SATURATION: 93 % | SYSTOLIC BLOOD PRESSURE: 120 MMHG | TEMPERATURE: 96.9 F

## 2024-12-28 NOTE — PROGRESS NOTES
[facility]  [address] [address]  [phone]  [place] [facility]  Code   32 (LTC)        NAME: Gaston Rucker  AGE: 74 y.o. SEX: male CODE STATUS: CPR    DATE OF ENCOUNTER: 12/27/2024    Assessment and Plan     1. Coronary artery disease involving native coronary artery of native heart without angina pectoris  Assessment & Plan:  NSTEMI 2017  Denies SOB, CP  Continue ASA 81 mg daily  Continue Atorvastatin 40 mg daily  2. Bipolar 1 disorder (HCC)  Assessment & Plan:  Mood stable  Continue Depakote 125 mg at HS  3. Benign prostatic hyperplasia, unspecified whether lower urinary tract symptoms present  Assessment & Plan:  Stable, denies urinary symptoms  Continue Flomax 0.4 mg daily  4. Ambulatory dysfunction  Assessment & Plan:  Multifactorial in the setting of chronic medical conditions  Ambulates with wheelchair  Continue PT/OT as needed  Fall Precautions  Ensure adequate nutrition/hydration   Assist with ADL's/IADL's as needed    5. Mild cognitive impairment  Assessment & Plan:  AAO x 3, forgetful at times  Continue Depakote 125 mg at HS  Provide redirection, reorientation, distraction techniques  Fall Precautions  Assist with ADLs/IADLs  Avoid deliriogenic medications such as tramadol, benzodiazepines, anticholinergics, benadryl  Encourage Hydration/ Nutrition  Implement sleep hygiene, limit night time interuptions   Encourage participation in group activities when appropriate     6. Acute cough  Assessment & Plan:  Patient complaining of nonproductive cough  Currently 96% on room air  Lung sounds clear  Chest x-ray was done and was negative  Cathryn-Wishram ordered 10 mL every 8 hours as needed x 7 days  COVID-negative  Will continue to monitor  7. Iron deficiency anemia, unspecified iron deficiency anemia type  Assessment & Plan:  Episode of GI bleed in October 2024 requiring hospitalization  EGD done 10/18/2024 showing grade D esophagitis  Patient received 1 unit PRBC while  inpatient  No reports of bleeding from SNF staff  Will repeat CBC 12/31/2024  Continue ferrous sulfate 1 tablet 3 times a week             All medications and routine orders were reviewed and updated as needed.    Chief Complaint     LTC follow up visit  Patient's care was coordinated with nursing facility staff. Recent vitals, labs, and updated medications were review on [facility] system in facility.  Past Medical and Surgical History      Past Medical History:   Diagnosis Date    Anemia     Anxiety     Groves's esophagus     BPH (benign prostatic hyperplasia)     COVID-19     Depression     Esophageal ulcer     Hematemesis     HTN (hypertension) 12/05/2017    Hyperlipidemia     Hypertension     MCI (mild cognitive impairment)     Melena 10/18/2024    Muscle weakness     TIA (transient ischemic attack)      History reviewed. No pertinent surgical history.  No Known Allergies       History of Present Illness     HPI  Gaston Rucker is a 74 year old male, he is a LTC resident of [facility] since 5/1/2020. Past Medical Hx including but not limited to GERD, HTN, CVA, BPH, depression, NSTEMI. He was seen in collaboration with nursing for medical mgmt and LTC follow up.     Gaston was seen and examined at bedside today. He is awake and alert, forgetful at times. On exam patient is in his wheelchair and does not appear to be in distress.  He denies pain, has a good appetite. Patient ambulates with wheelchair. Requires assistance with ADL's/IADL's.  He complains of nonproductive cough.  LSC, 96% on room air.  Chest x-ray ordered, negative for pneumonia, CHF.  COVID swab negative.  Cathryn-tussin ordered, 10 mL every 8 hours as needed x 7 days.  He denies CP/SOB/N/V/D. Denies lightheadedness, dizziness, headaches, vision changes. Patient states they are eating well and staying hydrated. Patient is incontinent of bowel and bladder. Per review of SNF records, Patient is eating 3 meals per day,  consuming  %. Last documented BM 12/27/2024. No concerns from nursing at this time.    The patient's allergies, past medical, surgical, social and family history were reviewed and unchanged.    Review of Systems     Review of Systems   Constitutional:  Negative for activity change, appetite change and fever.   HENT: Negative.  Negative for congestion.    Eyes: Negative.    Respiratory:  Positive for cough. Negative for shortness of breath and wheezing.    Cardiovascular:  Negative for chest pain and palpitations.   Gastrointestinal: Negative.  Negative for abdominal distention, abdominal pain, constipation, diarrhea, nausea and vomiting.   Endocrine: Negative.    Genitourinary: Negative.  Negative for difficulty urinating and dysuria.   Skin: Negative.    Allergic/Immunologic: Negative.    Neurological:  Positive for weakness. Negative for dizziness and headaches.   Hematological: Negative.    Psychiatric/Behavioral:  Negative for sleep disturbance.          Objective     Vitals:   Vitals:    12/28/24 1717   BP: 120/89   Pulse: 80   Resp: 18   Temp: (!) 96.9 °F (36.1 °C)   SpO2: 93%             Physical Exam  Vitals and nursing note reviewed.   Constitutional:       General: He is not in acute distress.     Appearance: Normal appearance. He is not ill-appearing.   HENT:      Head: Normocephalic and atraumatic.      Nose: No congestion.   Eyes:      Conjunctiva/sclera: Conjunctivae normal.   Cardiovascular:      Rate and Rhythm: Normal rate and regular rhythm.      Heart sounds: Normal heart sounds.   Pulmonary:      Effort: Pulmonary effort is normal. No respiratory distress.      Breath sounds: No wheezing, rhonchi or rales.   Abdominal:      General: Bowel sounds are normal. There is no distension.      Palpations: Abdomen is soft.      Tenderness: There is no abdominal tenderness.   Skin:     General: Skin is warm.   Neurological:      Mental Status: Mental status is at baseline.      Motor: Weakness  "present.      Gait: Gait abnormal.   Psychiatric:         Mood and Affect: Mood normal.         Behavior: Behavior normal.         Pertinent Laboratory/Diagnostic Studies:   Reviewed in facility chart-stable      Current Medications   Medications reviewed and updated see facility MAR for details.      Current Outpatient Medications:     acetaminophen (TYLENOL) 325 mg tablet, Take 650 mg by mouth every 6 (six) hours as needed, Disp: , Rfl:     aspirin 81 mg chewable tablet, Chew 1 tablet (81 mg total) daily, Disp: 60 tablet, Rfl: 0    atorvastatin (LIPITOR) 40 mg tablet, Take 1 tablet (40 mg total) by mouth daily with dinner, Disp: , Rfl:     Cholecalciferol 25 MCG (1000 UT) tablet, Take 2,000 Units by mouth daily, Disp: , Rfl:     divalproex sodium (DEPAKOTE SPRINKLE) 125 MG capsule, Take 1 capsule (125 mg total) by mouth daily at bedtime, Disp: , Rfl:     ferrous sulfate 324 (65 Fe) mg, Take 1 tablet (324 mg total) by mouth 3 (three) times a week, Disp: , Rfl:     furosemide (LASIX) 20 mg tablet, Take 20 mg by mouth every morning, Disp: , Rfl:     multivitamin-iron-minerals-folic acid (THERAPEUTIC-M) TABS tablet, Take 1 tablet by mouth daily, Disp: , Rfl:     pantoprazole (PROTONIX) 40 mg tablet, Take 1 tablet (40 mg total) by mouth 2 (two) times a day, Disp: , Rfl:     polyethylene glycol (MIRALAX) 17 g packet, Take 17 g by mouth daily as needed, Disp: , Rfl:     sucralfate (CARAFATE) 1 g tablet, Take 1 g by mouth 2 (two) times a day with meals, Disp: , Rfl:     tamsulosin (FLOMAX) 0.4 mg, Take 0.4 mg by mouth daily with dinner, Disp: , Rfl:      Please note:  Voice-recognition software may have been used in the preparation of this document.  Occasional wrong word or \"sound-alike\" substitutions may have occurred due to the inherent limitations of voice recognition software.  Interpretation should be guided by context.         SYEDA Mart  12/29/2024  11:36 AM    "

## 2024-12-29 PROBLEM — R05.1 ACUTE COUGH: Status: ACTIVE | Noted: 2024-12-29

## 2024-12-29 NOTE — ASSESSMENT & PLAN NOTE
Patient complaining of nonproductive cough  Currently 96% on room air  Lung sounds clear  Chest x-ray was done and was negative  Cathryn-[place] ordered 10 mL every 8 hours as needed x 7 days  COVID-negative  Will continue to monitor

## 2024-12-29 NOTE — ASSESSMENT & PLAN NOTE
Episode of GI bleed in October 2024 requiring hospitalization  EGD done 10/18/2024 showing grade D esophagitis  Patient received 1 unit PRBC while inpatient  No reports of bleeding from SNF staff  Will repeat CBC 12/31/2024  Continue ferrous sulfate 1 tablet 3 times a week

## 2025-01-28 PROBLEM — R05.1 ACUTE COUGH: Status: RESOLVED | Noted: 2024-12-29 | Resolved: 2025-01-28

## 2025-02-06 ENCOUNTER — NURSING HOME VISIT (OUTPATIENT)
Dept: GERIATRICS | Facility: OTHER | Age: 75
End: 2025-02-06
Payer: MEDICARE

## 2025-02-06 DIAGNOSIS — I25.10 CORONARY ARTERY DISEASE INVOLVING NATIVE CORONARY ARTERY OF NATIVE HEART WITHOUT ANGINA PECTORIS: ICD-10-CM

## 2025-02-06 DIAGNOSIS — N40.0 BENIGN PROSTATIC HYPERPLASIA, UNSPECIFIED WHETHER LOWER URINARY TRACT SYMPTOMS PRESENT: ICD-10-CM

## 2025-02-06 DIAGNOSIS — I50.30 HEART FAILURE WITH PRESERVED EJECTION FRACTION, UNSPECIFIED HF CHRONICITY (HCC): Primary | ICD-10-CM

## 2025-02-06 DIAGNOSIS — Z86.73 HISTORY OF CVA (CEREBROVASCULAR ACCIDENT): ICD-10-CM

## 2025-02-06 DIAGNOSIS — K21.00 GASTROESOPHAGEAL REFLUX DISEASE WITH ESOPHAGITIS WITHOUT HEMORRHAGE: ICD-10-CM

## 2025-02-06 DIAGNOSIS — D50.9 IRON DEFICIENCY ANEMIA, UNSPECIFIED IRON DEFICIENCY ANEMIA TYPE: ICD-10-CM

## 2025-02-06 DIAGNOSIS — F31.9 BIPOLAR 1 DISORDER (HCC): ICD-10-CM

## 2025-02-06 PROCEDURE — 99309 SBSQ NF CARE MODERATE MDM 30: CPT | Performed by: INTERNAL MEDICINE

## 2025-02-07 NOTE — ASSESSMENT & PLAN NOTE
History of CVA.  Patient is wheelchair-bound.  Continue risk factor control.  Patient remains on aspirin and atorvastatin

## 2025-02-07 NOTE — ASSESSMENT & PLAN NOTE
Wt Readings from Last 3 Encounters:   10/21/24 113 kg (248 lb 0.3 oz)   08/25/24 117 kg (257 lb 4.8 oz)   05/20/24 116 kg (256 lb)     Patient appears to have increase edema involving bilateral lower extremities.  Last echo in 2019 revealed EF of 60% with grade 1 diastolic dysfunction.  Currently remains on furosemide 20 mg daily.  Will increase dose to 40 mg and follow

## 2025-02-07 NOTE — ASSESSMENT & PLAN NOTE
History of NSTEMI in  2017  Stable, no angina symptoms  Continue ASA 81 mg daily  Continue Atorvastatin 40 mg daily

## 2025-02-07 NOTE — PROGRESS NOTES
[facility]  [address]   [address]  [place], [place], [address] [phone]    Progress Note  Code [place] 32    Patient Location     [facility]    Reason for visit     Follow-up CAD, bipolar disorder, BPH, mild cognitive impairment, anemia, ambulatory dysfunction    Patient’s recent vitals, labs and updated medications were reviewed on [employer] system of facility.     Problem List Items Addressed This Visit       Coronary artery disease involving native coronary artery of native heart without angina pectoris    History of NSTEMI in  2017  Stable, no angina symptoms  Continue ASA 81 mg daily  Continue Atorvastatin 40 mg daily         Gastroesophageal reflux disease with esophagitis    Stable on Carafate and pantoprazole         BPH (benign prostatic hyperplasia)      Continue Flomax 0.4 mg daily         History of CVA (cerebrovascular accident)    History of CVA.  Patient is wheelchair-bound.  Continue risk factor control.  Patient remains on aspirin and atorvastatin         Anemia    Hemoglobin was stable at 11.4 on 12/31/2024.  Continue to monitor periodically         Bipolar 1 disorder (HCC)    Mood stable  Continue Depakote 125 mg at HS         (HFpEF) heart failure with preserved ejection fraction (HCC) - Primary    Wt Readings from Last 3 Encounters:   10/21/24 113 kg (248 lb 0.3 oz)   08/25/24 117 kg (257 lb 4.8 oz)   05/20/24 116 kg (256 lb)     Patient appears to have increase edema involving bilateral lower extremities.  Last echo in 2019 revealed EF of 60% with grade 1 diastolic dysfunction.  Currently remains on furosemide 20 mg daily.  Will increase dose to 40 mg and follow                      HPI         Patient is being seen for a follow-up visit.  He is doing okay at present.  Awake alert in no distress.  He is able to make his needs known.  Patient is wheelchair-bound.  Per records patient has gained around 3 pounds in the last 2 months      Review of  Systems   Constitutional:  Negative for chills and fever.   Respiratory:  Negative for shortness of breath, wheezing and stridor.    Cardiovascular:  Negative for chest pain.   Gastrointestinal:  Negative for abdominal distention, abdominal pain and vomiting.   Genitourinary:  Negative for flank pain and hematuria.   Musculoskeletal:  Positive for arthralgias and gait problem.   Neurological:  Positive for weakness. Negative for seizures and syncope.   Psychiatric/Behavioral:  Negative for agitation and behavioral problems.        Past Medical History:   Diagnosis Date    Anemia     Anxiety     Groves's esophagus     BPH (benign prostatic hyperplasia)     COVID-19     Depression     Esophageal ulcer     Hematemesis     HTN (hypertension) 12/05/2017    Hyperlipidemia     Hypertension     MCI (mild cognitive impairment)     Melena 10/18/2024    Muscle weakness     TIA (transient ischemic attack)        No past surgical history on file.    Social History     Tobacco Use   Smoking Status Never   Smokeless Tobacco Never       No family history on file.     No Known Allergies      Current Outpatient Medications:     acetaminophen (TYLENOL) 325 mg tablet, Take 650 mg by mouth every 6 (six) hours as needed, Disp: , Rfl:     aspirin 81 mg chewable tablet, Chew 1 tablet (81 mg total) daily, Disp: 60 tablet, Rfl: 0    atorvastatin (LIPITOR) 40 mg tablet, Take 1 tablet (40 mg total) by mouth daily with dinner, Disp: , Rfl:     Cholecalciferol 25 MCG (1000 UT) tablet, Take 2,000 Units by mouth daily, Disp: , Rfl:     divalproex sodium (DEPAKOTE SPRINKLE) 125 MG capsule, Take 1 capsule (125 mg total) by mouth daily at bedtime, Disp: , Rfl:     ferrous sulfate 324 (65 Fe) mg, Take 1 tablet (324 mg total) by mouth 3 (three) times a week, Disp: , Rfl:     furosemide (LASIX) 20 mg tablet, Take 20 mg by mouth every morning, Disp: , Rfl:     multivitamin-iron-minerals-folic acid (THERAPEUTIC-M) TABS tablet, Take 1 tablet by mouth  "daily, Disp: , Rfl:     pantoprazole (PROTONIX) 40 mg tablet, Take 1 tablet (40 mg total) by mouth 2 (two) times a day, Disp: , Rfl:     polyethylene glycol (MIRALAX) 17 g packet, Take 17 g by mouth daily as needed, Disp: , Rfl:     sucralfate (CARAFATE) 1 g tablet, Take 1 g by mouth 2 (two) times a day with meals, Disp: , Rfl:     tamsulosin (FLOMAX) 0.4 mg, Take 0.4 mg by mouth daily with dinner, Disp: , Rfl:     Updated list was reviewed in [facility] of facility.     /74  Tm 97  HR 85 Sao2 98  RR 18    Physical Exam  Constitutional:       General: He is not in acute distress.  HENT:      Head: Normocephalic and atraumatic.   Cardiovascular:      Rate and Rhythm: Normal rate and regular rhythm.   Pulmonary:      Breath sounds: No wheezing, rhonchi or rales.   Abdominal:      General: There is no distension.      Palpations: Abdomen is soft.      Tenderness: There is no abdominal tenderness. There is no guarding.   Musculoskeletal:      Cervical back: Neck supple.      Right lower leg: Edema present.      Left lower leg: Edema present.      Comments: Edema is more pronounced over RLLE   Neurological:      Cranial Nerves: No cranial nerve deficit.      Motor: Weakness present.   Psychiatric:         Mood and Affect: Mood normal.         Behavior: Behavior normal.         Diagnostic Data:      CBC and BMP from 12/31/2024 was reviewed.  Hemoglobin 11.4, WBC count 9.1, platelet count 289  BUN 15, creatinine 0.93, sodium was mildly high at 126  K 4.8      Additional Notes:   Increase Lasix to 40 mg daily.  Repeat BMP in 10 days  Repeat TSH, last level was slightly high at 5 in march/24    Portions of the record may have been created with voice recognition software.  Occasional wrong word or \"sound a like\" substitutions may have occurred due to the inherent limitations of voice recognition software.  Read the chart carefully and recognize, using context, where substitutions have occurred.    This note " was electronically signed by Dr. Nohemi Rae

## 2025-02-18 ENCOUNTER — TELEPHONE (OUTPATIENT)
Dept: GASTROENTEROLOGY | Facility: AMBULARY SURGERY CENTER | Age: 75
End: 2025-02-18

## 2025-02-18 NOTE — TELEPHONE ENCOUNTER
Naty Hinds at [facility] to r/s pt appt on 4/3/25. There has been a change in the providers schedule. Another reach out was done prior to this. Please schedule pt for 1st available when pt calls back.

## 2025-03-25 NOTE — ASSESSMENT & PLAN NOTE
A&Ox3 today during assessment. Disoriented to situation.   Continues on Depakote 125 mg HS.   No increased negative behaviors reported per nursing.  Plan to continue to monitor for increasing confusion, will continue supportive measures.   Ensure adequate hydration/nutrition, good sleep hygiene.   Fall precautions in place.

## 2025-03-25 NOTE — ASSESSMENT & PLAN NOTE
Stable.   Continues on Depakote 125 mg daily for mood stabilization.   Plan to continue to monitor mood, supportive measures in place.

## 2025-03-25 NOTE — PROGRESS NOTES
[facility]  [address] [address]  [phone]  FACILITY: [facility]  Code 32 (LTC)        NAME: Gaston Rucker  AGE: 74 y.o. SEX: male CODE STATUS: CPR    DATE OF ENCOUNTER: 3/26/2025    Assessment and Plan     1. Chronic heart failure with preserved ejection fraction (HCC)  Assessment & Plan:  Wt Readings from Last 3 Encounters:   10/21/24 113 kg (248 lb 0.3 oz)   08/25/24 117 kg (257 lb 4.8 oz)   05/20/24 116 kg (256 lb)   Continues on Lasix 40 mg daily, recently increased due to excess fluid.   Will order BNP and BMP for the AM for fluid status/electrolyte monitoring.   Plan to continue to monitor weights while at LT, has remained stable.   2. Chest congestion  Assessment & Plan:  Reports chest congestion that has progressively gotten worse over the last few days.   Denies any SOB or chest pain.   Will order Mucinex 600 mg BID x5 days.   If congestion persists, can order nebulizer treatments and CXR to r/o other infectious causes.   Plan to monitor respiratory status closely for hypoxia or worsening symptoms.   Will order BNP for the AM to monitor fluid status, recently increased Lasix d/t fluid overload.   3. History of CVA (cerebrovascular accident)  Assessment & Plan:  Hx of CVA.   Continues on ASA and Atorvastatin for risk reduction.   4. Mild cognitive impairment  Assessment & Plan:  A&Ox3 today during assessment. Disoriented to situation.   Continues on Depakote 125 mg HS.   No increased negative behaviors reported per nursing.  Plan to continue to monitor for increasing confusion, will continue supportive measures.   Ensure adequate hydration/nutrition, good sleep hygiene.   Fall precautions in place.   5. Ambulatory dysfunction  Assessment & Plan:  Multifactorial.   Resides at [facility] for assistance with ADLs/IADLs.   Ambulates with a wheelchair at baseline.   Ensure adequate nutrition, hydration. Fall precautions in place.   PT/OT services PRN.   6. Bipolar 1  disorder (HCC)  Assessment & Plan:  Stable.   Continues on Depakote 125 mg daily for mood stabilization.   Plan to continue to monitor mood, supportive measures in place.   7. Anemia, unspecified type  Assessment & Plan:  Continues on Ferrous sulfate 324 mg 3x a week.   Plan for CBC check in the AM to monitor for stability.        All medications and routine orders were reviewed and updated as needed.    Chief Complaint     LTC follow up visit     Past Medical and Surgical History      Past Medical History:   Diagnosis Date    Anemia     Anxiety     Groves's esophagus     BPH (benign prostatic hyperplasia)     COVID-19     Depression     Esophageal ulcer     Hematemesis     HTN (hypertension) 12/05/2017    Hyperlipidemia     Hypertension     MCI (mild cognitive impairment)     Melena 10/18/2024    Muscle weakness     TIA (transient ischemic attack)      No past surgical history on file.  No Known Allergies       History of Present Illness     Gaston Rucker is a 74 y.o. male LTC patient at [facility]. Patient has a past medical Hx including but not limited to CHF, CAD, BPH, Bipolar 1, depression, and ambulatory dysfunction. Patient is medically managed by MD/CRNP at [facility].     Seen and examined at bedside today. Patient is a reliable historian. He reports more congestion which is causing him to cough more. Otherwise he denies any CP/SOB/N/V/D. Denies lightheadedness, dizziness, headaches, vision changes. Patient states they are eating well and staying hydrated. Denies any bowel or bladder issues. Per review of SNF records, patient is eating 3 meals per day, consuming %. Last documented BM was 3/26. No concerns from nursing at this time.    The patient's allergies, past medical, surgical, social and family history were reviewed and unchanged.    Review of Systems     Review of Systems   HENT:  Positive for congestion.    Respiratory:  Positive for cough.    Musculoskeletal:  Positive for gait problem.    Neurological:  Positive for weakness.   All other systems reviewed and are negative.    Objective     Vitals: There were no vitals filed for this visit.      Physical Exam  Constitutional:       General: He is not in acute distress.     Appearance: Normal appearance. He is normal weight. He is not ill-appearing.   HENT:      Head: Normocephalic and atraumatic.      Nose: Congestion present.   Cardiovascular:      Rate and Rhythm: Normal rate and regular rhythm.      Pulses: Normal pulses.      Heart sounds: Normal heart sounds. No murmur heard.  Pulmonary:      Effort: Pulmonary effort is normal. No respiratory distress.      Breath sounds: Normal breath sounds. No wheezing.   Abdominal:      General: Bowel sounds are normal. There is no distension.      Palpations: Abdomen is soft.      Tenderness: There is no abdominal tenderness.   Musculoskeletal:         General: Normal range of motion.      Cervical back: Normal range of motion and neck supple.      Right lower leg: Edema (trace) present.      Left lower leg: Edema (trace) present.   Skin:     General: Skin is warm and dry.   Neurological:      General: No focal deficit present.      Mental Status: He is alert and oriented to person, place, and time. Mental status is at baseline.      Motor: Weakness present.      Gait: Gait abnormal.   Psychiatric:         Mood and Affect: Mood normal.         Behavior: Behavior normal.         Pertinent Laboratory/Diagnostic Studies:     Reviewed in facility chart      Current Medications   Medications reviewed and updated see facility MAR for details.      Current Outpatient Medications:     acetaminophen (TYLENOL) 325 mg tablet, Take 650 mg by mouth every 6 (six) hours as needed, Disp: , Rfl:     aspirin 81 mg chewable tablet, Chew 1 tablet (81 mg total) daily, Disp: 60 tablet, Rfl: 0    atorvastatin (LIPITOR) 40 mg tablet, Take 1 tablet (40 mg total) by mouth daily with dinner, Disp: , Rfl:     Cholecalciferol 25 MCG  "(1000 UT) tablet, Take 2,000 Units by mouth daily, Disp: , Rfl:     divalproex sodium (DEPAKOTE SPRINKLE) 125 MG capsule, Take 1 capsule (125 mg total) by mouth daily at bedtime, Disp: , Rfl:     ferrous sulfate 324 (65 Fe) mg, Take 1 tablet (324 mg total) by mouth 3 (three) times a week, Disp: , Rfl:     furosemide (LASIX) 20 mg tablet, Take 40 mg by mouth every morning, Disp: , Rfl:     multivitamin-iron-minerals-folic acid (THERAPEUTIC-M) TABS tablet, Take 1 tablet by mouth daily, Disp: , Rfl:     pantoprazole (PROTONIX) 40 mg tablet, Take 1 tablet (40 mg total) by mouth 2 (two) times a day, Disp: , Rfl:     polyethylene glycol (MIRALAX) 17 g packet, Take 17 g by mouth daily as needed, Disp: , Rfl:     sucralfate (CARAFATE) 1 g tablet, Take 1 g by mouth 2 (two) times a day with meals, Disp: , Rfl:     tamsulosin (FLOMAX) 0.4 mg, Take 0.4 mg by mouth daily with dinner, Disp: , Rfl:      Please note:  Voice-recognition software may have been used in the preparation of this document.  Occasional wrong word or \"sound-alike\" substitutions may have occurred due to the inherent limitations of voice recognition software.  Interpretation should be guided by context.         SYEDA Romero  3/26/2025      "

## 2025-03-25 NOTE — ASSESSMENT & PLAN NOTE
Wt Readings from Last 3 Encounters:   10/21/24 113 kg (248 lb 0.3 oz)   08/25/24 117 kg (257 lb 4.8 oz)   05/20/24 116 kg (256 lb)   Continues on Lasix 40 mg daily, recently increased due to excess fluid.   Will order BNP and BMP for the AM for fluid status/electrolyte monitoring.   Plan to continue to monitor weights while at LTC, has remained stable.

## 2025-03-25 NOTE — ASSESSMENT & PLAN NOTE
Continues on Ferrous sulfate 324 mg 3x a week.   Plan for CBC check in the AM to monitor for stability.

## 2025-03-25 NOTE — ASSESSMENT & PLAN NOTE
Multifactorial.   Resides at LTC for assistance with ADLs/IADLs.   Ambulates with a wheelchair at baseline.   Ensure adequate nutrition, hydration. Fall precautions in place.   PT/OT services PRN.

## 2025-03-26 ENCOUNTER — NURSING HOME VISIT (OUTPATIENT)
Dept: GERIATRICS | Facility: OTHER | Age: 75
End: 2025-03-26

## 2025-03-26 DIAGNOSIS — F31.9 BIPOLAR 1 DISORDER (HCC): ICD-10-CM

## 2025-03-26 DIAGNOSIS — R09.89 CHEST CONGESTION: ICD-10-CM

## 2025-03-26 DIAGNOSIS — R26.2 AMBULATORY DYSFUNCTION: ICD-10-CM

## 2025-03-26 DIAGNOSIS — I50.32 CHRONIC HEART FAILURE WITH PRESERVED EJECTION FRACTION (HCC): Primary | ICD-10-CM

## 2025-03-26 DIAGNOSIS — G31.84 MILD COGNITIVE IMPAIRMENT: Chronic | ICD-10-CM

## 2025-03-26 DIAGNOSIS — D64.9 ANEMIA, UNSPECIFIED TYPE: ICD-10-CM

## 2025-03-26 DIAGNOSIS — Z86.73 HISTORY OF CVA (CEREBROVASCULAR ACCIDENT): ICD-10-CM

## 2025-03-26 NOTE — ASSESSMENT & PLAN NOTE
Reports chest congestion that has progressively gotten worse over the last few days.   Denies any SOB or chest pain.   Will order Mucinex 600 mg BID x5 days.   If congestion persists, can order nebulizer treatments and CXR to r/o other infectious causes.   Plan to monitor respiratory status closely for hypoxia or worsening symptoms.   Will order BNP for the AM to monitor fluid status, recently increased Lasix d/t fluid overload.

## 2025-05-20 ENCOUNTER — NURSING HOME VISIT (OUTPATIENT)
Dept: GERIATRICS | Facility: OTHER | Age: 75
End: 2025-05-20

## 2025-05-20 DIAGNOSIS — I25.10 CORONARY ARTERY DISEASE INVOLVING NATIVE CORONARY ARTERY OF NATIVE HEART WITHOUT ANGINA PECTORIS: ICD-10-CM

## 2025-05-20 DIAGNOSIS — R73.03 PREDIABETES: ICD-10-CM

## 2025-05-20 DIAGNOSIS — F31.9 BIPOLAR 1 DISORDER (HCC): ICD-10-CM

## 2025-05-20 DIAGNOSIS — I63.81 THALAMIC INFARCTION (HCC): ICD-10-CM

## 2025-05-20 DIAGNOSIS — G31.84 MILD COGNITIVE IMPAIRMENT: Primary | ICD-10-CM

## 2025-05-20 DIAGNOSIS — I63.81 BASAL GANGLIA INFARCTION (HCC): ICD-10-CM

## 2025-05-20 NOTE — PROGRESS NOTES
[facility]  [address] [address] [phone]  [facility]      NAME: Gaston Rucker  AGE: 74 y.o. SEX: male [phone]    DATE OF ENCOUNTER: 5/20/2025    Assessment and Plan     1. Mild cognitive impairment (Primary)  I do think this has already progressed to dementia.   He has had known cognitive deficit as well as a decline in functionality.     He has known vascular conditions as well as imaging showing microangiopathic changes as well as thalamic and basal ganglia infarcts.     Continue with supportive treatment.           2. Bipolar 1 disorder (HCC)    Has a history of BPD type 1 for many years.   He has been doing well off of valproic acid.     Continue to monitor sytmpoms.     Fu with Psychiatry at [facility].   Reviewed last notes.       3. Coronary artery disease involving native coronary artery of native heart without angina pectoris  Stable.   No current sytmpoms  Remain on Asa and statin.       4. Prediabetes  Dm friendly diet. Periodic A1c checks.     5. Thalamic infarction (HCC)  Continue with statin and ASA    6. Basal ganglia infarction (HCC)  Continue with statin and asa.         Chief Complaint     Routine Long term follow-up visit.    History of Present Illness     Prakash is seen for fu of chronic conditions at ltc/[facility].   Seen at bedside . Reports no issues or complaints.   Eating and sleeping well.   Moods have been stable.     Known BPD with relatively recent gdr of valproic aic and has been doing well.         The following portions of the patient's history were reviewed and updated as appropriate: allergies, current medications, past family history, past medical history, past social history, past surgical history and problem list.    Review of Systems     Review of Systems   Constitutional: Negative.  Negative for activity change, appetite change, chills and diaphoresis.   HENT:  Negative for congestion and dental problem.    Respiratory: Negative.  Negative  for apnea, chest tightness, shortness of breath and wheezing.    Cardiovascular: Negative.  Negative for chest pain, palpitations and leg swelling.   Gastrointestinal: Negative.  Negative for abdominal distention, abdominal pain, constipation, diarrhea and nausea.   Genitourinary: Negative.  Negative for difficulty urinating, dysuria and frequency.       Active Problem List   Problem List[1]    Objective     Vitals:  BP: 134/75 mmHg  5/2/2025 09:10   Temp:97 °F  5/2/2025 09:10 Pulse:82 bpm  5/2/2025 09:10 Weight:261.2 Lbs  5/15/2025 09:11   Resp:18 Breaths/min  5/2/2025 09:10 BS:119 mg/dL  4/2/2022 06:27 O2:98 %  5/2/2025 09:10 Pain:0  5/20/2025 08:06       Physical Exam  Vitals reviewed.   Constitutional:       General: He is not in acute distress.     Appearance: Normal appearance. He is well-developed.   HENT:      Head: Normocephalic and atraumatic.      Right Ear: External ear normal.      Left Ear: External ear normal.      Nose: Nose normal.      Mouth/Throat:      Mouth: Mucous membranes are moist.     Eyes:      Conjunctiva/sclera: Conjunctivae normal.      Pupils: Pupils are equal, round, and reactive to light.       Cardiovascular:      Rate and Rhythm: Normal rate and regular rhythm.      Heart sounds: Normal heart sounds. No murmur heard.     No friction rub. No gallop.   Pulmonary:      Effort: Pulmonary effort is normal. No respiratory distress.      Breath sounds: Normal breath sounds. No wheezing or rales.   Chest:      Chest wall: No tenderness.   Abdominal:      General: Bowel sounds are normal. There is no distension.      Palpations: Abdomen is soft. There is no mass.      Tenderness: There is no abdominal tenderness. There is no guarding or rebound.     Musculoskeletal:         General: Normal range of motion.      Cervical back: Normal range of motion and neck supple.     Skin:     General: Skin is warm.     Neurological:      Mental Status: He is alert and oriented to person, place, and time.      Psychiatric:         Mood and Affect: Mood normal.         Behavior: Behavior normal.         Thought Content: Thought content normal.         Judgment: Judgment normal.         Pertinent Laboratory/Diagnostic Studies:  Refer to facility chart.    Current Medications   Medications reviewed and updated in facility chart.           [1]  Patient Active Problem List  Diagnosis   • Multiple injuries   • Ambulatory dysfunction   • Falls frequently   • Prediabetes   • Coronary artery disease involving native coronary artery of native heart without angina pectoris   • Mild cognitive impairment   • Morbid obesity with BMI of 45.0-49.9, adult ([facility])   • Knee pain   • Multiple wounds of skin   • Thalamic infarction ([facility])   • Basal ganglia infarction ([facility])   • COVID   • Gastroesophageal reflux disease with esophagitis   • Depression   • BPH (benign prostatic hyperplasia)   • History of CVA (cerebrovascular accident)   • Anemia   • Bilateral lower extremity edema   • Expiratory wheezing   • Bipolar 1 disorder ([facility])   • (HFpEF) heart failure with preserved ejection fraction ([facility])   • Leukocytosis   • Melena   • Heme positive stool   • Chest congestion

## 2025-07-17 ENCOUNTER — NURSING HOME VISIT (OUTPATIENT)
Dept: GERIATRICS | Facility: OTHER | Age: 75
End: 2025-07-17
Payer: MEDICARE

## 2025-07-17 VITALS
RESPIRATION RATE: 18 BRPM | OXYGEN SATURATION: 97 % | DIASTOLIC BLOOD PRESSURE: 90 MMHG | WEIGHT: 250 LBS | TEMPERATURE: 97.5 F | BODY MASS INDEX: 44.29 KG/M2 | SYSTOLIC BLOOD PRESSURE: 139 MMHG | HEART RATE: 65 BPM

## 2025-07-17 DIAGNOSIS — Z86.73 HISTORY OF CVA (CEREBROVASCULAR ACCIDENT): ICD-10-CM

## 2025-07-17 DIAGNOSIS — K92.2 UPPER GI BLEED: ICD-10-CM

## 2025-07-17 DIAGNOSIS — I25.10 CORONARY ARTERY DISEASE INVOLVING NATIVE CORONARY ARTERY OF NATIVE HEART WITHOUT ANGINA PECTORIS: Primary | ICD-10-CM

## 2025-07-17 DIAGNOSIS — I50.30 HEART FAILURE WITH PRESERVED EJECTION FRACTION, UNSPECIFIED HF CHRONICITY (HCC): ICD-10-CM

## 2025-07-17 DIAGNOSIS — K92.2 GIB (GASTROINTESTINAL BLEEDING): ICD-10-CM

## 2025-07-17 DIAGNOSIS — K92.2 GI BLEED: ICD-10-CM

## 2025-07-17 DIAGNOSIS — G31.84 MILD COGNITIVE IMPAIRMENT: Chronic | ICD-10-CM

## 2025-07-17 DIAGNOSIS — R26.2 AMBULATORY DYSFUNCTION: ICD-10-CM

## 2025-07-17 PROCEDURE — 99309 SBSQ NF CARE MODERATE MDM 30: CPT

## 2025-07-17 RX ORDER — CETIRIZINE HYDROCHLORIDE 10 MG/1
10 TABLET ORAL
COMMUNITY

## 2025-07-17 RX ORDER — PANTOPRAZOLE SODIUM 40 MG/1
40 TABLET, DELAYED RELEASE ORAL DAILY
Start: 2025-07-17 | End: 2025-08-16

## 2025-07-17 NOTE — PROGRESS NOTES
[facility]  [address] [address]  [phone]  [facility]  Code   32 (LTC)        NAME: Gaston Rucker  AGE: 74 y.o. SEX: male CODE STATUS: CPR    DATE OF ENCOUNTER: 7/17/2025    Assessment and Plan     1. Coronary artery disease involving native coronary artery of native heart without angina pectoris  Assessment & Plan:  NSTEMI 2017  Denies SOB, CP  Continue ASA 81 mg daily  Continue Atorvastatin 40 mg daily  2. Heart failure with preserved ejection fraction, unspecified HF chronicity (HCC)  Assessment & Plan:  Wt Readings from Last 3 Encounters:   07/17/25 113 kg (250 lb)   10/21/24 113 kg (248 lb 0.3 oz)   08/25/24 117 kg (257 lb 4.8 oz)   Weight stable  Continue Lasix 40 mg daily          3. Ambulatory dysfunction  Assessment & Plan:  Multifactorial in the setting of chronic medical conditions  Ambulates with wheelchair  Continue PT/OT as needed  Fall Precautions  Ensure adequate nutrition/hydration   Assist with ADL's/IADL's as needed    4. History of CVA (cerebrovascular accident)  Assessment & Plan:  Stable  Continue aspirin 81 mg daily  continue atorvastatin 40 mg daily  5. Mild cognitive impairment  Assessment & Plan:  AAO x 3, forgetful at times  Continue Depakote 125 mg at HS  Provide redirection, reorientation, distraction techniques  Fall Precautions  Assist with ADLs/IADLs  Avoid deliriogenic medications such as tramadol, benzodiazepines, anticholinergics, benadryl  Encourage Hydration/ Nutrition  Implement sleep hygiene, limit night time interuptions   Encourage participation in group activities when appropriate     6. GI bleed  7. GIB (gastrointestinal bleeding)  8. Upper GI bleed               All medications and routine orders were reviewed and updated as needed.    Chief Complaint     [facility] follow up visit  Patient's care was coordinated with nursing facility staff. Recent vitals, labs, and updated medications were review on [facility] system in  facility.  Past Medical and Surgical History      Past Medical History:   Diagnosis Date    Anemia     Anxiety     Groves's esophagus     BPH (benign prostatic hyperplasia)     COVID-19     Depression     Esophageal ulcer     Hematemesis     HTN (hypertension) 12/05/2017    Hyperlipidemia     Hypertension     MCI (mild cognitive impairment)     Melena 10/18/2024    Muscle weakness     TIA (transient ischemic attack)      No past surgical history on file.  No Known Allergies       History of Present Illness     HPI  Gaston Rucker is a 74 year old male, he is a LTC resident of [facility] since 5/1/2020. Past Medical Hx including but not limited to GERD, HTN, CVA, BPH, depression, NSTEMI. He was seen in collaboration with nursing for medical mgmt and LTC follow up.     Gaston was seen and examined today. His is AAO. On exam patient is in his wheelchair in the lounge and does not appear to be in distress.  He denies pain, has a good appetite. Patient ambulates with wheelchair. Requires assistance with ADL's/IADL's.  He c/o runny nose at bedtime, will start Zyrtec at HS.    He denies CP/SOB/N/V/D. Denies lightheadedness, dizziness, headaches, vision changes. Patient states they are eating well and staying hydrated. Patient is incontinent of bowel and bladder. Per review of SNF records, Patient is eating 3 meals per day, consuming  %. Last documented BM 7/17/25. No concerns from nursing at this time.    The patient's allergies, past medical, surgical, social and family history were reviewed and unchanged.    Review of Systems     Review of Systems   Constitutional:  Negative for activity change, appetite change and fever.   HENT:  Positive for rhinorrhea. Negative for congestion.    Eyes: Negative.    Respiratory:  Negative for cough, shortness of breath and wheezing.    Cardiovascular:  Negative for chest pain and palpitations.   Gastrointestinal: Negative.  Negative for abdominal distention,  abdominal pain, constipation, diarrhea, nausea and vomiting.   Endocrine: Negative.    Genitourinary: Negative.  Negative for difficulty urinating and dysuria.   Skin: Negative.    Allergic/Immunologic: Negative.    Neurological:  Positive for weakness. Negative for dizziness and headaches.   Hematological: Negative.    Psychiatric/Behavioral:  Negative for sleep disturbance.          Objective     Vitals:   Vitals:    07/17/25 1202   BP: 139/90   Pulse: 65   Resp: 18   Temp: 97.5 °F (36.4 °C)   SpO2: 97%               Physical Exam  Vitals and nursing note reviewed.   Constitutional:       General: He is not in acute distress.     Appearance: Normal appearance. He is not ill-appearing.   HENT:      Head: Normocephalic and atraumatic.      Nose: No congestion.     Eyes:      Conjunctiva/sclera: Conjunctivae normal.       Cardiovascular:      Rate and Rhythm: Normal rate and regular rhythm.      Heart sounds: Normal heart sounds.   Pulmonary:      Effort: Pulmonary effort is normal. No respiratory distress.      Breath sounds: No wheezing, rhonchi or rales.   Abdominal:      General: Bowel sounds are normal. There is no distension.      Palpations: Abdomen is soft.      Tenderness: There is no abdominal tenderness.     Skin:     General: Skin is warm.     Neurological:      Mental Status: Mental status is at baseline.      Motor: Weakness present.      Gait: Gait abnormal.     Psychiatric:         Mood and Affect: Mood normal.         Behavior: Behavior normal.         Pertinent Laboratory/Diagnostic Studies:   Reviewed in facility chart-stable      Current Medications   Medications reviewed and updated see facility MAR for details.      Current Outpatient Medications:     cetirizine (ZyrTEC) 10 mg tablet, Take 10 mg by mouth daily at bedtime, Disp: , Rfl:     ferrous sulfate 324 (65 Fe) mg, Take 1 tablet (324 mg total) by mouth 3 (three) times a week, Disp: , Rfl:     pantoprazole (PROTONIX) 40 mg tablet, Take 1  "tablet (40 mg total) by mouth 2 (two) times a day, Disp: , Rfl:     sucralfate (CARAFATE) 1 g tablet, Take 1 g by mouth 2 (two) times a day with meals, Disp: , Rfl:     tamsulosin (FLOMAX) 0.4 mg, Take 0.4 mg by mouth daily with dinner, Disp: , Rfl:     acetaminophen (TYLENOL) 325 mg tablet, Take 650 mg by mouth every 6 (six) hours as needed, Disp: , Rfl:     aspirin 81 mg chewable tablet, Chew 1 tablet (81 mg total) daily, Disp: 60 tablet, Rfl: 0    atorvastatin (LIPITOR) 40 mg tablet, Take 1 tablet (40 mg total) by mouth daily with dinner, Disp: , Rfl:     Cholecalciferol 25 MCG (1000 UT) tablet, Take 2,000 Units by mouth daily, Disp: , Rfl:     furosemide (LASIX) 20 mg tablet, Take 40 mg by mouth every morning, Disp: , Rfl:     multivitamin-iron-minerals-folic acid (THERAPEUTIC-M) TABS tablet, Take 1 tablet by mouth daily, Disp: , Rfl:     polyethylene glycol (MIRALAX) 17 g packet, Take 17 g by mouth daily as needed, Disp: , Rfl:      Please note:  Voice-recognition software may have been used in the preparation of this document.  Occasional wrong word or \"sound-alike\" substitutions may have occurred due to the inherent limitations of voice recognition software.  Interpretation should be guided by context.         SYEDA Mart  7/17/2025  3:52 PM    "

## 2025-07-17 NOTE — ASSESSMENT & PLAN NOTE
Wt Readings from Last 3 Encounters:   07/17/25 113 kg (250 lb)   10/21/24 113 kg (248 lb 0.3 oz)   08/25/24 117 kg (257 lb 4.8 oz)   Weight stable  Continue Lasix 40 mg daily

## 2025-07-26 ENCOUNTER — TELEPHONE (OUTPATIENT)
Dept: OTHER | Facility: OTHER | Age: 75
End: 2025-07-26

## 2025-07-26 NOTE — TELEPHONE ENCOUNTER
Nola from [facility] called reporting labs are completed and need to be reviewed.     On call notified via epic chat